# Patient Record
Sex: MALE | Race: WHITE | NOT HISPANIC OR LATINO | Employment: OTHER | RURAL
[De-identification: names, ages, dates, MRNs, and addresses within clinical notes are randomized per-mention and may not be internally consistent; named-entity substitution may affect disease eponyms.]

---

## 2017-02-07 ENCOUNTER — OFFICE VISIT (OUTPATIENT)
Dept: FAMILY MEDICINE CLINIC | Facility: CLINIC | Age: 67
End: 2017-02-07

## 2017-02-07 VITALS
TEMPERATURE: 98.7 F | BODY MASS INDEX: 27.03 KG/M2 | DIASTOLIC BLOOD PRESSURE: 70 MMHG | WEIGHT: 168.2 LBS | OXYGEN SATURATION: 99 % | HEIGHT: 66 IN | SYSTOLIC BLOOD PRESSURE: 130 MMHG | HEART RATE: 91 BPM

## 2017-02-07 DIAGNOSIS — J44.9 CHRONIC OBSTRUCTIVE PULMONARY DISEASE, UNSPECIFIED COPD TYPE (HCC): ICD-10-CM

## 2017-02-07 DIAGNOSIS — E66.3 OVERWEIGHT (BMI 25.0-29.9): ICD-10-CM

## 2017-02-07 DIAGNOSIS — I10 ESSENTIAL HYPERTENSION: Primary | ICD-10-CM

## 2017-02-07 DIAGNOSIS — G89.29 CHRONIC RIGHT-SIDED LOW BACK PAIN WITH RIGHT-SIDED SCIATICA: ICD-10-CM

## 2017-02-07 DIAGNOSIS — M54.41 CHRONIC RIGHT-SIDED LOW BACK PAIN WITH RIGHT-SIDED SCIATICA: ICD-10-CM

## 2017-02-07 DIAGNOSIS — J41.0 SIMPLE CHRONIC BRONCHITIS (HCC): ICD-10-CM

## 2017-02-07 DIAGNOSIS — F17.210 CIGARETTE SMOKER: ICD-10-CM

## 2017-02-07 PROCEDURE — 99214 OFFICE O/P EST MOD 30 MIN: CPT | Performed by: FAMILY MEDICINE

## 2017-02-07 NOTE — PROGRESS NOTES
Subjective   Yehuda Rutledge is a 66 y.o. overweight white male smoker with COPD, Allergies, H/O Lumbar back surgery, Lumbago/Sciatica, H/O ETOH abuse in remission, and other health problems see PMH/PSH. Pt presents for exam, to discuss current health problem list, tx plan and F/U plan.    ' Breathing has been OK. Allergies and sinuses doing all right. Back pain ,and sciatica has not flared lately'.    Back Pain   This is a chronic problem. The current episode started more than 1 month ago. The problem occurs daily. The problem has been gradually improving since onset. The pain is present in the lumbar spine (Right Lumbar pain radiating down R lat leg. ). The quality of the pain is described as aching, shooting and stabbing. The pain radiates to the right thigh, right knee and right foot. The pain is at a severity of 1/10. The pain is mild. The symptoms are aggravated by standing, twisting and position. Stiffness is present all day. Pertinent negatives include no abdominal pain or fever. (Pain and numbness radiating down R outer leg) Risk factors include poor posture. He has tried analgesics, bed rest, heat, chiropractic manipulation, home exercises, muscle relaxant, NSAIDs, walking and ice for the symptoms. The treatment provided significant relief.   COPD   This is a chronic problem. The current episode started more than 1 year ago. The problem occurs daily. The problem has been unchanged. Associated symptoms include congestion and coughing. Pertinent negatives include no abdominal pain, chills, fatigue, fever, myalgias or sore throat. The symptoms are aggravated by coughing, exertion and smoking. The treatment provided mild relief.   Allergic Reaction   This is a chronic problem. The current episode started more than 1 week ago. The problem occurs daily. The problem has been gradually worsening since onset. Associated with: Chronic allergies, recently exposed to mold with worsening upper resp symptoms. The time of  exposure was just prior to onset. Incident location: [place], exposed to mold ,has allergy. Associated symptoms include coughing and wheezing. Pertinent negatives include no abdominal pain. Past treatments include one or more OTC medications. The treatment provided significant relief. His past medical history is significant for medication allergies and seasonal allergies. (PCN, Mold)        The following portions of the patient's history were reviewed and updated as appropriate: allergies, current medications, past family history, past medical history, past social history, past surgical history and problem list.    Review of Systems   Constitutional: Negative for chills, fatigue and fever.   HENT: Positive for congestion. Negative for mouth sores, postnasal drip and sore throat.    Eyes: Negative for discharge and visual disturbance.   Respiratory: Positive for cough and wheezing. Negative for shortness of breath.    Cardiovascular: Negative for palpitations and leg swelling.   Gastrointestinal: Negative for abdominal pain.   Endocrine: Negative.    Musculoskeletal: Positive for back pain. Negative for myalgias.   Allergic/Immunologic: Negative.    Neurological: Negative.    Hematological: Negative for adenopathy. Does not bruise/bleed easily.   Psychiatric/Behavioral: Negative for dysphoric mood. The patient is nervous/anxious. The patient is not hyperactive.        Objective   Physical Exam   Constitutional: He is oriented to person, place, and time. He appears well-developed and well-nourished. No distress.   HENT:   Head: Normocephalic and atraumatic.   Right Ear: External ear normal.   Left Ear: External ear normal.   Nose: Nose normal.   Mouth/Throat: Oropharynx is clear and moist. No oropharyngeal exudate.   Eyes: EOM are normal. Pupils are equal, round, and reactive to light. Right eye exhibits no discharge. Left eye exhibits no discharge. No scleral icterus.   Neck: Normal  range of motion. Neck supple. No JVD present. No tracheal deviation present. No thyromegaly present.   Cardiovascular: Normal rate, regular rhythm, normal heart sounds and intact distal pulses.  Exam reveals no gallop and no friction rub.    No murmur heard.  Pulmonary/Chest: Effort normal. No stridor. No respiratory distress. He has wheezes. He has no rales. He exhibits no tenderness.   Abdominal: Soft. Bowel sounds are normal. He exhibits no distension and no mass. There is no tenderness. There is no rebound and no guarding. No hernia.   Musculoskeletal:   Low back pain WB2-4 with SL, No worse with  L leg crossover. Pain radiating down R leg has improved,    Lymphadenopathy:     He has no cervical adenopathy.   Neurological: He is alert and oriented to person, place, and time. He has normal reflexes. He displays normal reflexes. No cranial nerve deficit. He exhibits normal muscle tone. Coordination normal.   Skin: Skin is warm and dry. No rash noted. He is not diaphoretic. No erythema. No pallor.   Psychiatric: He has a normal mood and affect. His behavior is normal. Judgment and thought content normal.   Nursing note and vitals reviewed.      Assessment/Plan      Yehuda was seen today for copd, bronchitis, hyperlipidemia, cough and toe pain.    Diagnoses and all orders for this visit:    Essential hypertension  -     Comprehensive Metabolic Panel; Future  -     TSH; Future  -     CBC & AUTO Differential; Future  -     Lipid Panel; Future  -     Urinalysis (clean catch); Future    Simple chronic bronchitis    Overweight (BMI 25.0-29.9)    Cigarette smoker    Chronic right-sided low back pain with right-sided sciatica    Chronic obstructive pulmonary disease, unspecified COPD type      Discussed current health problem list, meds, indications, tx plan, rationale, F/U plan. Discussed BMI, BEE 3-4 small meals 1800 ginny/day. Benefits of exercise. Smoking cessation x 3mins. Discussed USPSTF recommendations.

## 2017-02-08 NOTE — PATIENT INSTRUCTIONS
"Smoking Cessation, Tips for Success  If you are ready to quit smoking, congratulations! You have chosen to help yourself be healthier. Cigarettes bring nicotine, tar, carbon monoxide, and other irritants into your body. Your lungs, heart, and blood vessels will be able to work better without these poisons. There are many different ways to quit smoking. Nicotine gum, nicotine patches, a nicotine inhaler, or nicotine nasal spray can help with physical craving. Hypnosis, support groups, and medicines help break the habit of smoking.  WHAT THINGS CAN I DO TO MAKE QUITTING EASIER?   Here are some tips to help you quit for good:  · Pick a date when you will quit smoking completely. Tell all of your friends and family about your plan to quit on that date.  · Do not try to slowly cut down on the number of cigarettes you are smoking. Pick a quit date and quit smoking completely starting on that day.  · Throw away all cigarettes.    · Clean and remove all ashtrays from your home, work, and car.  · On a card, write down your reasons for quitting. Carry the card with you and read it when you get the urge to smoke.  · Cleanse your body of nicotine. Drink enough water and fluids to keep your urine clear or pale yellow. Do this after quitting to flush the nicotine from your body.  · Learn to predict your moods. Do not let a bad situation be your excuse to have a cigarette. Some situations in your life might tempt you into wanting a cigarette.  · Never have \"just one\" cigarette. It leads to wanting another and another. Remind yourself of your decision to quit.  · Change habits associated with smoking. If you smoked while driving or when feeling stressed, try other activities to replace smoking. Stand up when drinking your coffee. Brush your teeth after eating. Sit in a different chair when you read the paper. Avoid alcohol while trying to quit, and try to drink fewer caffeinated beverages. Alcohol and caffeine may urge you to " "smoke.  · Avoid foods and drinks that can trigger a desire to smoke, such as sugary or spicy foods and alcohol.  · Ask people who smoke not to smoke around you.  · Have something planned to do right after eating or having a cup of coffee. For example, plan to take a walk or exercise.  · Try a relaxation exercise to calm you down and decrease your stress. Remember, you may be tense and nervous for the first 2 weeks after you quit, but this will pass.  · Find new activities to keep your hands busy. Play with a pen, coin, or rubber band. Doodle or draw things on paper.  · Brush your teeth right after eating. This will help cut down on the craving for the taste of tobacco after meals. You can also try mouthwash.    · Use oral substitutes in place of cigarettes. Try using lemon drops, carrots, cinnamon sticks, or chewing gum. Keep them handy so they are available when you have the urge to smoke.  · When you have the urge to smoke, try deep breathing.  · Designate your home as a nonsmoking area.  · If you are a heavy smoker, ask your health care provider about a prescription for nicotine chewing gum. It can ease your withdrawal from nicotine.  · Reward yourself. Set aside the cigarette money you save and buy yourself something nice.  · Look for support from others. Join a support group or smoking cessation program. Ask someone at home or at work to help you with your plan to quit smoking.  · Always ask yourself, \"Do I need this cigarette or is this just a reflex?\" Tell yourself, \"Today, I choose not to smoke,\" or \"I do not want to smoke.\" You are reminding yourself of your decision to quit.  · Do not replace cigarette smoking with electronic cigarettes (commonly called e-cigarettes). The safety of e-cigarettes is unknown, and some may contain harmful chemicals.  · If you relapse, do not give up! Plan ahead and think about what you will do the next time you get the urge to smoke.  HOW WILL I FEEL WHEN I QUIT SMOKING?  You " may have symptoms of withdrawal because your body is used to nicotine (the addictive substance in cigarettes). You may crave cigarettes, be irritable, feel very hungry, cough often, get headaches, or have difficulty concentrating. The withdrawal symptoms are only temporary. They are strongest when you first quit but will go away within 10-14 days. When withdrawal symptoms occur, stay in control. Think about your reasons for quitting. Remind yourself that these are signs that your body is healing and getting used to being without cigarettes. Remember that withdrawal symptoms are easier to treat than the major diseases that smoking can cause.   Even after the withdrawal is over, expect periodic urges to smoke. However, these cravings are generally short lived and will go away whether you smoke or not. Do not smoke!  WHAT RESOURCES ARE AVAILABLE TO HELP ME QUIT SMOKING?  Your health care provider can direct you to community resources or hospitals for support, which may include:  · Group support.  · Education.  · Hypnosis.  · Therapy.     This information is not intended to replace advice given to you by your health care provider. Make sure you discuss any questions you have with your health care provider.     Document Released: 09/15/2005 Document Revised: 01/08/2016 Document Reviewed: 06/05/2014  [employer] Interactive Patient Education ©2016 [employer]  Calorie Counting for Weight Loss  Calories are energy you get from the things you eat and drink. Your body uses this energy to keep you going throughout the day. The number of calories you eat affects your weight. When you eat more calories than your body needs, your body stores the extra calories as fat. When you eat fewer calories than your body needs, your body burns fat to get the energy it needs.  Calorie counting means keeping track of how many calories you eat and drink each day. If you make sure to eat fewer calories than your body needs, you should lose  weight. In order for calorie counting to work, you will need to eat the number of calories that are right for you in a day to lose a healthy amount of weight per week. A healthy amount of weight to lose per week is usually 1-2 lb (0.5-0.9 kg). A dietitian can determine how many calories you need in a day and give you suggestions on how to reach your calorie goal.   WHAT IS MY MY PLAN?  My goal is to have __________ calories per day.   If I have this many calories per day, I should lose around __________ pounds per week.  WHAT DO I NEED TO KNOW ABOUT CALORIE COUNTING?  In order to meet your daily calorie goal, you will need to:  · Find out how many calories are in each food you would like to eat. Try to do this before you eat.  · Decide how much of the food you can eat.  · Write down what you ate and how many calories it had. Doing this is called keeping a food log.  WHERE DO I FIND CALORIE INFORMATION?  The number of calories in a food can be found on a Nutrition Facts label. Note that all the information on a label is based on a specific serving of the food. If a food does not have a Nutrition Facts label, try to look up the calories online or ask your dietitian for help.  HOW DO I DECIDE HOW MUCH TO EAT?  To decide how much of the food you can eat, you will need to consider both the number of calories in one serving and the size of one serving. This information can be found on the Nutrition Facts label. If a food does not have a Nutrition Facts label, look up the information online or ask your dietitian for help.  Remember that calories are listed per serving. If you choose to have more than one serving of a food, you will have to multiply the calories per serving by the amount of servings you plan to eat. For example, the label on a package of bread might say that a serving size is 1 slice and that there are 90 calories in a serving. If you eat 1 slice, you will have eaten 90 calories. If you eat 2 slices, you  will have eaten 180 calories.  HOW DO I KEEP A FOOD LOG?  After each meal, record the following information in your food log:  · What you ate.  · How much of it you ate.  · How many calories it had.  · Then, add up your calories.  Keep your food log near you, such as in a small notebook in your pocket. Another option is to use a mobile kateryna or website. Some programs will calculate calories for you and show you how many calories you have left each time you add an item to the log.  WHAT ARE SOME CALORIE COUNTING TIPS?  · Use your calories on foods and drinks that will fill you up and not leave you hungry. Some examples of this include foods like nuts and nut butters, vegetables, lean proteins, and high-fiber foods (more than 5 g fiber per serving).  · Eat nutritious foods and avoid empty calories. Empty calories are calories you get from foods or beverages that do not have many nutrients, such as candy and soda. It is better to have a nutritious high-calorie food (such as an avocado) than a food with few nutrients (such as a bag of chips).  · Know how many calories are in the foods you eat most often. This way, you do not have to look up how many calories they have each time you eat them.  · Look out for foods that may seem like low-calorie foods but are really high-calorie foods, such as baked goods, soda, and fat-free candy.  · Pay attention to calories in drinks. Drinks such as sodas, specialty coffee drinks, alcohol, and juices have a lot of calories yet do not fill you up. Choose low-calorie drinks like water and diet drinks.  · Focus your calorie counting efforts on higher calorie items. Logging the calories in a garden salad that contains only vegetables is less important than calculating the calories in a milk shake.  · Find a way of tracking calories that works for you. Get creative. Most people who are successful find ways to keep track of how much they eat in a day, even if they do not count every  calorie.  WHAT ARE SOME PORTION CONTROL TIPS?  · Know how many calories are in a serving. This will help you know how many servings of a certain food you can have.  · Use a measuring cup to measure serving sizes. This is helpful when you start out. With time, you will be able to estimate serving sizes for some foods.  · Take some time to put servings of different foods on your favorite plates, bowls, and cups so you know what a serving looks like.  · Try not to eat straight from a bag or box. Doing this can lead to overeating. Put the amount you would like to eat in a cup or on a plate to make sure you are eating the right portion.  · Use smaller plates, glasses, and bowls to prevent overeating. This is a quick and easy way to practice portion control. If your plate is smaller, less food can fit on it.  · Try not to multitask while eating, such as watching TV or using your computer. If it is time to eat, sit down at a table and enjoy your food. Doing this will help you to start recognizing when you are full. It will also make you more aware of what and how much you are eating.  HOW CAN I CALORIE COUNT WHEN EATING OUT?  · Ask for smaller portion sizes or child-sized portions.  · Consider sharing an entree and sides instead of getting your own entree.  · If you get your own entree, eat only half. Ask for a box at the beginning of your meal and put the rest of your entree in it so you are not tempted to eat it.  · Look for the calories on the menu. If calories are listed, choose the lower calorie options.  · Choose dishes that include vegetables, fruits, whole grains, low-fat dairy products, and lean protein. Focusing on smart food choices from each of the 5 food groups can help you stay on track at restaurants.  · Choose items that are boiled, broiled, grilled, or steamed.  · Choose water, milk, unsweetened iced tea, or other drinks without added sugars. If you want an alcoholic beverage, choose a lower calorie  "option. For example, a regular angela can have up to 700 calories and a glass of wine has around 150.  · Stay away from items that are buttered, battered, fried, or served with cream sauce. Items labeled \"crispy\" are usually fried, unless stated otherwise.  · Ask for dressings, sauces, and syrups on the side. These are usually very high in calories, so do not eat much of them.  · Watch out for salads. Many people think salads are a healthy option, but this is often not the case. Many salads come with barrett, fried chicken, lots of cheese, fried chips, and dressing. All of these items have a lot of calories. If you want a salad, choose a garden salad and ask for grilled meats or steak. Ask for the dressing on the side, or ask for olive oil and vinegar or lemon to use as dressing.  · Estimate how many servings of a food you are given. For example, a serving of cooked rice is ½ cup or about the size of half a tennis ball or one cupcake wrapper. Knowing serving sizes will help you be aware of how much food you are eating at restaurants. The list below tells you how big or small some common portion sizes are based on everyday objects.    1 oz--4 stacked dice.    3 oz--1 deck of cards.    1 tsp--1 dice.    1 Tbsp--½ a Ping-Pong ball.    2 Tbsp--1 Ping-Pong ball.    ½ cup--1 tennis ball or 1 cupcake wrapper.    1 cup--1 baseball.     This information is not intended to replace advice given to you by your health care provider. Make sure you discuss any questions you have with your health care provider.     Document Released: 12/18/2006 Document Revised: 01/08/2016 Document Reviewed: 10/23/2014  [employer] Interactive Patient Education ©2016 [employer]  Exercising to Lose Weight  Exercising can help you to lose weight. In order to lose weight through exercise, you need to do vigorous-intensity exercise. You can tell that you are exercising with vigorous intensity if you are breathing very hard and fast and cannot hold a " conversation while exercising.  Moderate-intensity exercise helps to maintain your current weight. You can tell that you are exercising at a moderate level if you have a higher heart rate and faster breathing, but you are still able to hold a conversation.  HOW OFTEN SHOULD I EXERCISE?  Choose an activity that you enjoy and set realistic goals. Your health care provider can help you to make an activity plan that works for you. Exercise regularly as directed by your health care provider. This may include:  · Doing resistance training twice each week, such as:    Push-ups.    Sit-ups.    Lifting weights.    Using resistance bands.  · Doing a given intensity of exercise for a given amount of time. Choose from these options:    150 minutes of moderate-intensity exercise every week.    75 minutes of vigorous-intensity exercise every week.    A mix of moderate-intensity and vigorous-intensity exercise every week.  Children, pregnant women, people who are out of shape, people who are overweight, and older adults may need to consult a health care provider for individual recommendations. If you have any sort of medical condition, be sure to consult your health care provider before starting a new exercise program.  WHAT ARE SOME ACTIVITIES THAT CAN HELP ME TO LOSE WEIGHT?   · Walking at a rate of at least 4.5 miles an hour.  · Jogging or running at a rate of 5 miles per hour.  · Biking at a rate of at least 10 miles per hour.  · Lap swimming.  · Roller-skating or in-line skating.  · Cross-country skiing.  · Vigorous competitive sports, such as football, basketball, and soccer.  · Jumping rope.  · Aerobic dancing.  HOW CAN I BE MORE ACTIVE IN MY DAY-TO-DAY ACTIVITIES?  · Use the stairs instead of the elevator.  · Take a walk during your lunch break.  · If you drive, park your car farther away from work or school.  · If you take public transportation, get off one stop early and walk the rest of the way.  · Make all of your  phone calls while standing up and walking around.  · Get up, stretch, and walk around every 30 minutes throughout the day.  WHAT GUIDELINES SHOULD I FOLLOW WHILE EXERCISING?  · Do not exercise so much that you hurt yourself, feel dizzy, or get very short of breath.  · Consult your health care provider prior to starting a new exercise program.  · Wear comfortable clothes and shoes with good support.  · Drink plenty of water while you exercise to prevent dehydration or heat stroke. Body water is lost during exercise and must be replaced.  · Work out until you breathe faster and your heart beats faster.     This information is not intended to replace advice given to you by your health care provider. Make sure you discuss any questions you have with your health care provider.     Document Released: 01/20/2012 Document Revised: 01/08/2016 Document Reviewed: 05/21/2015  [employer] Interactive Patient Education ©2016 [employer]  Preventive Care for Adults, Male  A healthy lifestyle and preventive care can promote health and wellness. Preventive health guidelines for men include the following key practices:  · A routine yearly physical is a good way to check with your health care provider about your health and preventative screening. It is a chance to share any concerns and updates on your health and to receive a thorough exam.  · Visit your dentist for a routine exam and preventative care every 6 months. Brush your teeth twice a day and floss once a day. Good oral hygiene prevents tooth decay and gum disease.  · The frequency of eye exams is based on your age, health, family medical history, use of contact lenses, and other factors. Follow your health care provider's recommendations for frequency of eye exams.  · Eat a healthy diet. Foods such as vegetables, fruits, whole grains, low-fat dairy products, and lean protein foods contain the nutrients you need without too many calories. Decrease your intake of foods high in  solid fats, added sugars, and salt. Eat the right amount of calories for you. Get information about a proper diet from your health care provider, if necessary.  · Regular physical exercise is one of the most important things you can do for your health. Most adults should get at least 150 minutes of moderate-intensity exercise (any activity that increases your heart rate and causes you to sweat) each week. In addition, most adults need muscle-strengthening exercises on 2 or more days a week.  · Maintain a healthy weight. The body mass index (BMI) is a screening tool to identify possible weight problems. It provides an estimate of body fat based on height and weight. Your health care provider can find your BMI and can help you achieve or maintain a healthy weight. For adults 20 years and older:    A BMI below 18.5 is considered underweight.    A BMI of 18.5 to 24.9 is normal.    A BMI of 25 to 29.9 is considered overweight.    A BMI of 30 and above is considered obese.  · Maintain normal blood lipids and cholesterol levels by exercising and minimizing your intake of saturated fat. Eat a balanced diet with plenty of fruit and vegetables. Blood tests for lipids and cholesterol should begin at age 20 and be repeated every 5 years. If your lipid or cholesterol levels are high, you are over 50, or you are at high risk for heart disease, you may need your cholesterol levels checked more frequently. Ongoing high lipid and cholesterol levels should be treated with medicines if diet and exercise are not working.  · If you smoke, find out from your health care provider how to quit. If you do not use tobacco, do not start.  · Lung cancer screening is recommended for adults aged 55-80 years who are at high risk for developing lung cancer because of a history of smoking. A yearly low-dose CT scan of the lungs is recommended for people who have at least a 30-pack-year history of smoking and are a current smoker or have quit within  the past 15 years. A pack year of smoking is smoking an average of 1 pack of cigarettes a day for 1 year (for example: 1 pack a day for 30 years or 2 packs a day for 15 years). Yearly screening should continue until the smoker has stopped smoking for at least 15 years. Yearly screening should be stopped for people who develop a health problem that would prevent them from having lung cancer treatment.  · If you choose to drink alcohol, do not have more than 2 drinks per day. One drink is considered to be 12 ounces (355 mL) of beer, 5 ounces (148 mL) of wine, or 1.5 ounces (44 mL) of liquor.  · Avoid use of street drugs. Do not share needles with anyone. Ask for help if you need support or instructions about stopping the use of drugs.  · High blood pressure causes heart disease and increases the risk of stroke. Your blood pressure should be checked at least every 1-2 years. Ongoing high blood pressure should be treated with medicines, if weight loss and exercise are not effective.  · If you are 45-79 years old, ask your health care provider if you should take aspirin to prevent heart disease.  · Diabetes screening is done by taking a blood sample to check your blood glucose level after you have not eaten for a certain period of time (fasting). If you are not overweight and you do not have risk factors for diabetes, you should be screened once every 3 years starting at age 45. If you are overweight or obese and you are 40-70 years of age, you should be screened for diabetes every year as part of your cardiovascular risk assessment.  · Colorectal cancer can be detected and often prevented. Most routine colorectal cancer screening begins at the age of 50 and continues through age 75. However, your health care provider may recommend screening at an earlier age if you have risk factors for colon cancer. On a yearly basis, your health care provider may provide home test kits to check for hidden blood in the stool. Use of a  small camera at the end of a tube to directly examine the colon (sigmoidoscopy or colonoscopy) can detect the earliest forms of colorectal cancer. Talk to your health care provider about this at age 50, when routine screening begins. Direct exam of the colon should be repeated every 5-10 years through age 75, unless early forms of precancerous polyps or small growths are found.  · People who are at an increased risk for hepatitis B should be screened for this virus. You are considered at high risk for hepatitis B if:    You were born in a country where hepatitis B occurs often. Talk with your health care provider about which countries are considered high risk.    Your parents were born in a high-risk country and you have not received a shot to protect against hepatitis B (hepatitis B vaccine).    You have HIV or AIDS.    You use needles to inject street drugs.    You live with, or have sex with, someone who has hepatitis B.    You are a man who has sex with other men (MSM).    You get hemodialysis treatment.    You take certain medicines for conditions such as cancer, organ transplantation, and autoimmune conditions.  · Hepatitis C blood testing is recommended for all people born from 1945 through 1965 and any individual with known risks for hepatitis C.  · Practice safe sex. Use condoms and avoid high-risk sexual practices to reduce the spread of sexually transmitted infections (STIs). STIs include gonorrhea, chlamydia, syphilis, trichomonas, herpes, HPV, and human immunodeficiency virus (HIV). Herpes, HIV, and HPV are viral illnesses that have no cure. They can result in disability, cancer, and death.  · If you are a man who has sex with other men, you should be screened at least once per year for:    HIV.    Urethral, rectal, and pharyngeal infection of gonorrhea, chlamydia, or both.  · If you are at risk of being infected with HIV, it is recommended that you take a prescription medicine daily to prevent HIV  infection. This is called preexposure prophylaxis (PrEP). You are considered at risk if:    You are a man who has sex with other men (MSM) and have other risk factors.    You are a heterosexual man, are sexually active, and are at increased risk for HIV infection.    You take drugs by injection.    You are sexually active with a partner who has HIV.    Talk with your health care provider about whether you are at high risk of being infected with HIV. If you choose to begin PrEP, you should first be tested for HIV. You should then be tested every 3 months for as long as you are taking PrEP.  · A one-time screening for abdominal aortic aneurysm (AAA) and surgical repair of large AAAs by ultrasound are recommended for men ages 65 to 75 years who are current or former smokers.  · Healthy men should no longer receive prostate-specific antigen (PSA) blood tests as part of routine cancer screening. Talk with your health care provider about prostate cancer screening.  · Testicular cancer screening is not recommended for adult males who have no symptoms. Screening includes self-exam, a health care provider exam, and other screening tests. Consult with your health care provider about any symptoms you have or any concerns you have about testicular cancer.  · Use sunscreen. Apply sunscreen liberally and repeatedly throughout the day. You should seek shade when your shadow is shorter than you. Protect yourself by wearing long sleeves, pants, a wide-brimmed hat, and sunglasses year round, whenever you are outdoors.  · Once a month, do a whole-body skin exam, using a mirror to look at the skin on your back. Tell your health care provider about new moles, moles that have irregular borders, moles that are larger than a pencil eraser, or moles that have changed in shape or color.  · Stay current with required vaccines (immunizations).    Influenza vaccine. All adults should be immunized every year.    Tetanus, diphtheria, and  acellular pertussis (Td, Tdap) vaccine. An adult who has not previously received Tdap or who does not know his vaccine status should receive 1 dose of Tdap. This initial dose should be followed by tetanus and diphtheria toxoids (Td) booster doses every 10 years. Adults with an unknown or incomplete history of completing a 3-dose immunization series with Td-containing vaccines should begin or complete a primary immunization series including a Tdap dose. Adults should receive a Td booster every 10 years.    Varicella vaccine. An adult without evidence of immunity to varicella should receive 2 doses or a second dose if he has previously received 1 dose.    Human papillomavirus (HPV) vaccine. Males aged 11-21 years who have not received the vaccine previously should receive the 3-dose series. Males aged 22-26 years may be immunized. Immunization is recommended through the age of 26 years for any male who has sex with males and did not get any or all doses earlier. Immunization is recommended for any person with an immunocompromised condition through the age of 26 years if he did not get any or all doses earlier. During the 3-dose series, the second dose should be obtained 4-8 weeks after the first dose. The third dose should be obtained 24 weeks after the first dose and 16 weeks after the second dose.    Zoster vaccine. One dose is recommended for adults aged 60 years or older unless certain conditions are present.    Measles, mumps, and rubella (MMR) vaccine. Adults born before 1957 generally are considered immune to measles and mumps. Adults born in 1957 or later should have 1 or more doses of MMR vaccine unless there is a contraindication to the vaccine or there is laboratory evidence of immunity to each of the three diseases. A routine second dose of MMR vaccine should be obtained at least 28 days after the first dose for students attending postsecondary schools, health care workers, or international travelers.  People who received inactivated measles vaccine or an unknown type of measles vaccine during [id number] should receive 2 doses of MMR vaccine. People who received inactivated mumps vaccine or an unknown type of mumps vaccine before 1979 and are at high risk for mumps infection should consider immunization with 2 doses of MMR vaccine. Unvaccinated health care workers born before 1957 who lack laboratory evidence of measles, mumps, or rubella immunity or laboratory confirmation of disease should consider measles and mumps immunization with 2 doses of MMR vaccine or rubella immunization with 1 dose of MMR vaccine.    Pneumococcal 13-valent conjugate (PCV13) vaccine. When indicated, a person who is uncertain of his immunization history and has no record of immunization should receive the PCV13 vaccine. All adults 65 years of age and older should receive this vaccine. An adult aged 19 years or older who has certain medical conditions and has not been previously immunized should receive 1 dose of PCV13 vaccine. This PCV13 should be followed with a dose of pneumococcal polysaccharide (PPSV23) vaccine. Adults who are at high risk for pneumococcal disease should obtain the PPSV23 vaccine at least 8 weeks after the dose of PCV13 vaccine. Adults older than 65 years of age who have normal immune system function should obtain the PPSV23 vaccine dose at least 1 year after the dose of PCV13 vaccine.    Pneumococcal polysaccharide (PPSV23) vaccine. When PCV13 is also indicated, PCV13 should be obtained first. All adults aged 65 years and older should be immunized. An adult younger than age 65 years who has certain medical conditions should be immunized. Any person who resides in a nursing home or long-term care facility should be immunized. An adult smoker should be immunized. People with an immunocompromised condition and certain other conditions should receive both PCV13 and PPSV23 vaccines. People with human immunodeficiency  virus (HIV) infection should be immunized as soon as possible after diagnosis. Immunization during chemotherapy or radiation therapy should be avoided. Routine use of PPSV23 vaccine is not recommended for American Indians, [place] Natives, or people younger than 65 years unless there are medical conditions that require PPSV23 vaccine. When indicated, people who have unknown immunization and have no record of immunization should receive PPSV23 vaccine. One-time revaccination 5 years after the first dose of PPSV23 is recommended for people aged 19-64 years who have chronic kidney failure, nephrotic syndrome, asplenia, or immunocompromised conditions. People who received 1-2 doses of PPSV23 before age 65 years should receive another dose of PPSV23 vaccine at age 65 years or later if at least 5 years have passed since the previous dose. Doses of PPSV23 are not needed for people immunized with PPSV23 at or after age 65 years.    Meningococcal vaccine. Adults with asplenia or persistent complement component deficiencies should receive 2 doses of quadrivalent meningococcal conjugate (MenACWY-D) vaccine. The doses should be obtained at least 2 months apart. Microbiologists working with certain meningococcal bacteria,  recruits, people at risk during an outbreak, and people who travel to or live in countries with a high rate of meningitis should be immunized. A first-year college student up through age 21 years who is living in a residence jimenes should receive a dose if he did not receive a dose on or after his 16th birthday. Adults who have certain high-risk conditions should receive one or more doses of vaccine.    Hepatitis A vaccine. Adults who wish to be protected from this disease, have chronic liver disease, work with hepatitis A-infected animals, work in hepatitis A research labs, or travel to or work in countries with a high rate of hepatitis A should be immunized. Adults who were previously unvaccinated and  who anticipate close contact with an international adoptee during the first 60 days after arrival in the [place] from a country with a high rate of hepatitis A should be immunized.    Hepatitis B vaccine. Adults should be immunized if they wish to be protected from this disease, are under age 59 years and have diabetes, have chronic liver disease, have had more than one sex partner in the past 6 months, may be exposed to blood or other infectious body fluids, are household contacts or sex partners of hepatitis B positive people, are clients or workers in certain care facilities, or travel to or work in countries with a high rate of hepatitis B.    Haemophilus influenzae type b (Hib) vaccine. A previously unvaccinated person with asplenia or sickle cell disease or having a scheduled splenectomy should receive 1 dose of Hib vaccine. Regardless of previous immunization, a recipient of a hematopoietic stem cell transplant should receive a 3-dose series 6-12 months after his successful transplant. Hib vaccine is not recommended for adults with HIV infection.  Preventive Service / Frequency  Ages 19 to 39  · Blood pressure check.** / Every 3-5 years.  · Lipid and cholesterol check.** / Every 5 years beginning at age 20.  · Hepatitis C blood test.** / For any individual with known risks for hepatitis C.  · Skin self-exam. / Monthly.  · Influenza vaccine. / Every year.  · Tetanus, diphtheria, and acellular pertussis (Tdap, Td) vaccine.** / Consult your health care provider. 1 dose of Td every 10 years.  · Varicella vaccine.** / Consult your health care provider.  · HPV vaccine. / 3 doses over 6 months, if 26 or younger.  · Measles, mumps, rubella (MMR) vaccine.** / You need at least 1 dose of MMR if you were born in 1957 or later. You may also need a second dose.  · Pneumococcal 13-valent conjugate (PCV13) vaccine.** / Consult your health care provider.  · Pneumococcal polysaccharide (PPSV23) vaccine.** / 1 to 2 doses  if you smoke cigarettes or if you have certain conditions.  · Meningococcal vaccine.** / 1 dose if you are age 19 to 21 years and a first-year college student living in a residence jimenes, or have one of several medical conditions. You may also need additional booster doses.  · Hepatitis A vaccine.** / Consult your health care provider.  · Hepatitis B vaccine.** / Consult your health care provider.  · Haemophilus influenzae type b (Hib) vaccine.** / Consult your health care provider.  Ages 40 to 64  · Blood pressure check.** / Every year.  · Lipid and cholesterol check.** / Every 5 years beginning at age 20.  · Lung cancer screening. / Every year if you are aged 55-80 years and have a 30-pack-year history of smoking and currently smoke or have quit within the past 15 years. Yearly screening is stopped once you have quit smoking for at least 15 years or develop a health problem that would prevent you from having lung cancer treatment.  · Fecal occult blood test (FOBT) of stool. / Every year beginning at age 50 and continuing until age 75. You may not have to do this test if you get a colonoscopy every 10 years.  · Flexible sigmoidoscopy** or colonoscopy.** / Every 5 years for a flexible sigmoidoscopy or every 10 years for a colonoscopy beginning at age 50 and continuing until age 75.  · Hepatitis C blood test.** / For all people born from 1945 through 1965 and any individual with known risks for hepatitis C.  · Skin self-exam. / Monthly.  · Influenza vaccine. / Every year.  · Tetanus, diphtheria, and acellular pertussis (Tdap/Td) vaccine.** / Consult your health care provider. 1 dose of Td every 10 years.  · Varicella vaccine.** / Consult your health care provider.  · Zoster vaccine.** / 1 dose for adults aged 60 years or older.  · Measles, mumps, rubella (MMR) vaccine.** / You need at least 1 dose of MMR if you were born in 1957 or later. You may also need a second dose.  · Pneumococcal 13-valent conjugate (PCV13)  vaccine.** / Consult your health care provider.  · Pneumococcal polysaccharide (PPSV23) vaccine.** / 1 to 2 doses if you smoke cigarettes or if you have certain conditions.  · Meningococcal vaccine.** / Consult your health care provider.  · Hepatitis A vaccine.** / Consult your health care provider.  · Hepatitis B vaccine.** / Consult your health care provider.  · Haemophilus influenzae type b (Hib) vaccine.** / Consult your health care provider.  Ages 65 and over  · Blood pressure check.** / Every year.  · Lipid and cholesterol check.**/ Every 5 years beginning at age 20.  · Lung cancer screening. / Every year if you are aged 55-80 years and have a 30-pack-year history of smoking and currently smoke or have quit within the past 15 years. Yearly screening is stopped once you have quit smoking for at least 15 years or develop a health problem that would prevent you from having lung cancer treatment.  · Fecal occult blood test (FOBT) of stool. / Every year beginning at age 50 and continuing until age 75. You may not have to do this test if you get a colonoscopy every 10 years.  · Flexible sigmoidoscopy** or colonoscopy.** / Every 5 years for a flexible sigmoidoscopy or every 10 years for a colonoscopy beginning at age 50 and continuing until age 75.  · Hepatitis C blood test.** / For all people born from 1945 through 1965 and any individual with known risks for hepatitis C.  · Abdominal aortic aneurysm (AAA) screening.** / A one-time screening for ages 65 to 75 years who are current or former smokers.  · Skin self-exam. / Monthly.  · Influenza vaccine. / Every year.  · Tetanus, diphtheria, and acellular pertussis (Tdap/Td) vaccine.** / 1 dose of Td every 10 years.  · Varicella vaccine.** / Consult your health care provider.  · Zoster vaccine.** / 1 dose for adults aged 60 years or older.  · Pneumococcal 13-valent conjugate (PCV13) vaccine.** / 1 dose for all adults aged 65 years and older.  · Pneumococcal  polysaccharide (PPSV23) vaccine.** / 1 dose for all adults aged 65 years and older.  · Meningococcal vaccine.** / Consult your health care provider.  · Hepatitis A vaccine.** / Consult your health care provider.  · Hepatitis B vaccine.** / Consult your health care provider.  · Haemophilus influenzae type b (Hib) vaccine.** / Consult your health care provider.  **Family history and personal history of risk and conditions may change your health care provider's recommendations.     This information is not intended to replace advice given to you by your health care provider. Make sure you discuss any questions you have with your health care provider.     Document Released: 02/13/2003 Document Revised: 01/08/2016 Document Reviewed: 05/14/2012  [employer] Interactive Patient Education ©2016 [employer]

## 2017-02-10 ENCOUNTER — LAB (OUTPATIENT)
Dept: LAB | Facility: CLINIC | Age: 67
End: 2017-02-10

## 2017-02-10 DIAGNOSIS — I10 ESSENTIAL HYPERTENSION: ICD-10-CM

## 2017-02-10 LAB
ALBUMIN SERPL-MCNC: 4.5 G/DL (ref 3.4–4.8)
ALBUMIN/GLOB SERPL: 1.5 G/DL (ref 1.1–1.8)
ALP SERPL-CCNC: 81 U/L (ref 38–126)
ALT SERPL W P-5'-P-CCNC: 35 U/L (ref 21–72)
ANION GAP SERPL CALCULATED.3IONS-SCNC: 11 MMOL/L (ref 5–15)
ARTICHOKE IGE QN: 75 MG/DL (ref 1–129)
AST SERPL-CCNC: 29 U/L (ref 17–59)
BASOPHILS # BLD AUTO: 0.03 10*3/MM3 (ref 0–0.2)
BASOPHILS NFR BLD AUTO: 0.3 % (ref 0–2)
BILIRUB SERPL-MCNC: 0.5 MG/DL (ref 0.2–1.3)
BILIRUB UR QL STRIP: NEGATIVE
BUN BLD-MCNC: 10 MG/DL (ref 7–21)
BUN/CREAT SERPL: 12.2 (ref 7–25)
CALCIUM SPEC-SCNC: 10.3 MG/DL (ref 8.4–10.2)
CHLORIDE SERPL-SCNC: 96 MMOL/L (ref 95–110)
CHOLEST SERPL-MCNC: 194 MG/DL (ref 0–199)
CLARITY UR: CLEAR
CO2 SERPL-SCNC: 29 MMOL/L (ref 22–31)
COLOR UR: YELLOW
CREAT BLD-MCNC: 0.82 MG/DL (ref 0.7–1.3)
DEPRECATED RDW RBC AUTO: 45.3 FL (ref 35.1–43.9)
EOSINOPHIL # BLD AUTO: 0.11 10*3/MM3 (ref 0–0.7)
EOSINOPHIL NFR BLD AUTO: 1 % (ref 0–7)
ERYTHROCYTE [DISTWIDTH] IN BLOOD BY AUTOMATED COUNT: 14.2 % (ref 11.5–14.5)
GFR SERPL CREATININE-BSD FRML MDRD: 95 ML/MIN/1.73 (ref 49–113)
GLOBULIN UR ELPH-MCNC: 3.1 GM/DL (ref 2.3–3.5)
GLUCOSE BLD-MCNC: 95 MG/DL (ref 60–100)
GLUCOSE UR STRIP-MCNC: NEGATIVE MG/DL
HCT VFR BLD AUTO: 46.9 % (ref 39–49)
HDLC SERPL-MCNC: 34 MG/DL (ref 60–200)
HGB BLD-MCNC: 16 G/DL (ref 13.7–17.3)
HGB UR QL STRIP.AUTO: NEGATIVE
IMM GRANULOCYTES # BLD: 0.06 10*3/MM3 (ref 0–0.02)
IMM GRANULOCYTES NFR BLD: 0.5 % (ref 0–0.5)
KETONES UR QL STRIP: NEGATIVE
LDLC/HDLC SERPL: 2.61 {RATIO} (ref 0–3.55)
LEUKOCYTE ESTERASE UR QL STRIP.AUTO: NEGATIVE
LYMPHOCYTES # BLD AUTO: 3.89 10*3/MM3 (ref 0.6–4.2)
LYMPHOCYTES NFR BLD AUTO: 34.6 % (ref 10–50)
MCH RBC QN AUTO: 29.8 PG (ref 26.5–34)
MCHC RBC AUTO-ENTMCNC: 34.1 G/DL (ref 31.5–36.3)
MCV RBC AUTO: 87.3 FL (ref 80–98)
MONOCYTES # BLD AUTO: 0.94 10*3/MM3 (ref 0–0.9)
MONOCYTES NFR BLD AUTO: 8.4 % (ref 0–12)
NEUTROPHILS # BLD AUTO: 6.21 10*3/MM3 (ref 2–8.6)
NEUTROPHILS NFR BLD AUTO: 55.2 % (ref 37–80)
NITRITE UR QL STRIP: NEGATIVE
PH UR STRIP.AUTO: 5 [PH] (ref 5–8)
PLATELET # BLD AUTO: 319 10*3/MM3 (ref 150–450)
PMV BLD AUTO: 11.2 FL (ref 8–12)
POTASSIUM BLD-SCNC: 5 MMOL/L (ref 3.5–5.1)
PROT SERPL-MCNC: 7.6 G/DL (ref 6.3–8.6)
PROT UR QL STRIP: NEGATIVE
RBC # BLD AUTO: 5.37 10*6/MM3 (ref 4.37–5.74)
SODIUM BLD-SCNC: 136 MMOL/L (ref 137–145)
SP GR UR STRIP: <1.001 (ref 1–1.03)
TRIGL SERPL-MCNC: 356 MG/DL (ref 20–199)
TSH SERPL DL<=0.05 MIU/L-ACNC: 1.74 MIU/ML (ref 0.46–4.68)
UROBILINOGEN UR QL STRIP: ABNORMAL
WBC NRBC COR # BLD: 11.24 10*3/MM3 (ref 3.2–9.8)

## 2017-02-10 PROCEDURE — 84443 ASSAY THYROID STIM HORMONE: CPT | Performed by: FAMILY MEDICINE

## 2017-02-10 PROCEDURE — 85025 COMPLETE CBC W/AUTO DIFF WBC: CPT | Performed by: FAMILY MEDICINE

## 2017-02-10 PROCEDURE — 80061 LIPID PANEL: CPT | Performed by: FAMILY MEDICINE

## 2017-02-10 PROCEDURE — 81003 URINALYSIS AUTO W/O SCOPE: CPT | Performed by: FAMILY MEDICINE

## 2017-02-10 PROCEDURE — 80053 COMPREHEN METABOLIC PANEL: CPT | Performed by: FAMILY MEDICINE

## 2017-02-13 ENCOUNTER — TELEPHONE (OUTPATIENT)
Dept: FAMILY MEDICINE CLINIC | Facility: CLINIC | Age: 67
End: 2017-02-13

## 2017-04-12 ENCOUNTER — OFFICE VISIT (OUTPATIENT)
Dept: FAMILY MEDICINE CLINIC | Facility: CLINIC | Age: 67
End: 2017-04-12

## 2017-04-12 VITALS
TEMPERATURE: 97.8 F | WEIGHT: 165.7 LBS | BODY MASS INDEX: 26.63 KG/M2 | OXYGEN SATURATION: 97 % | SYSTOLIC BLOOD PRESSURE: 122 MMHG | HEIGHT: 66 IN | HEART RATE: 104 BPM | DIASTOLIC BLOOD PRESSURE: 64 MMHG

## 2017-04-12 DIAGNOSIS — F17.210 CIGARETTE SMOKER: ICD-10-CM

## 2017-04-12 DIAGNOSIS — R59.0 ADENOPATHY, CERVICAL: ICD-10-CM

## 2017-04-12 DIAGNOSIS — J40 BRONCHITIS: Primary | ICD-10-CM

## 2017-04-12 DIAGNOSIS — Z91.09 ENVIRONMENTAL ALLERGIES: ICD-10-CM

## 2017-04-12 DIAGNOSIS — J01.01 ACUTE RECURRENT MAXILLARY SINUSITIS: ICD-10-CM

## 2017-04-12 PROCEDURE — 96372 THER/PROPH/DIAG INJ SC/IM: CPT | Performed by: FAMILY MEDICINE

## 2017-04-12 PROCEDURE — 99214 OFFICE O/P EST MOD 30 MIN: CPT | Performed by: FAMILY MEDICINE

## 2017-04-12 RX ORDER — TRIAMCINOLONE ACETONIDE 40 MG/ML
40 INJECTION, SUSPENSION INTRA-ARTICULAR; INTRAMUSCULAR ONCE
Status: COMPLETED | OUTPATIENT
Start: 2017-04-12 | End: 2017-04-12

## 2017-04-12 RX ORDER — BROMPHENIRAMINE MALEATE, PSEUDOEPHEDRINE HYDROCHLORIDE, AND DEXTROMETHORPHAN HYDROBROMIDE 2; 30; 10 MG/5ML; MG/5ML; MG/5ML
5 SYRUP ORAL 4 TIMES DAILY PRN
Qty: 473 ML | Refills: 0 | Status: SHIPPED | OUTPATIENT
Start: 2017-04-12 | End: 2017-11-06 | Stop reason: SDUPTHER

## 2017-04-12 RX ORDER — LEVOFLOXACIN 750 MG/1
750 TABLET ORAL DAILY
Qty: 7 TABLET | Refills: 0 | Status: SHIPPED | OUTPATIENT
Start: 2017-04-12 | End: 2017-07-03

## 2017-04-12 RX ADMIN — TRIAMCINOLONE ACETONIDE 40 MG: 40 INJECTION, SUSPENSION INTRA-ARTICULAR; INTRAMUSCULAR at 10:35

## 2017-04-12 NOTE — PROGRESS NOTES
Subjective     Yehuda Rutledge is a 66 y.o. overweight white male smoker with COPD, Allergies, H/O Lumbar back surgery, Lumbago/Sciatica, H/O ETOH abuse in remission, and other health problems see PMH/PSH. Pt presents for exam, to discuss current acute health problem, tx plan and F/U plan.    ' Cough, congestion, L neck, and facial swelling, started 5 days ago, getting worse, chills , body aches over past 2 days. Had pressure L cheek and eye, broke loose, and drainage down throat, L throat sore swollen area in neck'.     Facial Swelling   This is a new problem. The current episode started in the past 7 days. The problem occurs daily. The problem has been gradually worsening. Associated symptoms include congestion, coughing, headaches, neck pain and a sore throat. Pertinent negatives include no abdominal pain, chills, fatigue, fever, myalgias, nausea, rash or vomiting. The symptoms are aggravated by bending and smoking. He has tried acetaminophen and NSAIDs (OTC Cold meds) for the symptoms. The treatment provided no relief.   Cough   This is a recurrent (Bronchitis) problem. The current episode started in the past 7 days. The problem has been gradually worsening. The problem occurs constantly. The cough is productive of sputum. Associated symptoms include headaches, a sore throat and wheezing. Pertinent negatives include no chills, fever, myalgias, postnasal drip, rash or shortness of breath. The symptoms are aggravated by other (Sinus drainage). Risk factors for lung disease include smoking/tobacco exposure and occupational exposure. He has tried a beta-agonist inhaler for the symptoms. The treatment provided no relief. His past medical history is significant for bronchitis, COPD, environmental allergies and pneumonia.   Wheezing    This is a recurrent problem. The current episode started in the past 7 days. Associated symptoms include coughing, headaches, neck pain and a sore throat. Pertinent negatives include no  abdominal pain, chills, diarrhea, fever, rash, shortness of breath or vomiting. He has tried beta agonist inhalers for the symptoms. The treatment provided significant relief. His past medical history is significant for COPD and pneumonia.   Sinusitis   This is a new problem. The current episode started in the past 7 days. The problem has been gradually worsening since onset. The maximum temperature recorded prior to his arrival was 100.4 - 100.9 F. The fever has been present for 1 to 2 days. His pain is at a severity of 6/10. The pain is moderate. Associated symptoms include congestion, coughing, headaches, neck pain, sinus pressure and a sore throat. Pertinent negatives include no chills or shortness of breath. (L maxillary) Past treatments include oral decongestants. The treatment provided no relief.        The following portions of the patient's history were reviewed and updated as appropriate: allergies, current medications, past family history, past medical history, past social history, past surgical history and problem list.    Review of Systems   Constitutional: Negative for chills, fatigue and fever.   HENT: Positive for congestion, facial swelling, sinus pressure and sore throat. Negative for mouth sores and postnasal drip.    Eyes: Negative for pain, discharge and visual disturbance.   Respiratory: Positive for cough, chest tightness and wheezing. Negative for shortness of breath.    Cardiovascular: Negative for palpitations and leg swelling.   Gastrointestinal: Negative for abdominal pain, constipation, diarrhea, nausea and vomiting.   Endocrine: Negative.  Negative for cold intolerance and heat intolerance.   Genitourinary: Negative.  Negative for difficulty urinating, hematuria and urgency.   Musculoskeletal: Positive for back pain and neck pain. Negative for myalgias.   Skin: Negative.  Negative for color change, pallor, rash and wound.   Allergic/Immunologic: Positive for environmental allergies.  Negative for food allergies and immunocompromised state.   Neurological: Positive for headaches. Negative for tremors and speech difficulty.   Hematological: Positive for adenopathy. Does not bruise/bleed easily.        L cervical   Psychiatric/Behavioral: Negative for dysphoric mood. The patient is nervous/anxious. The patient is not hyperactive.        Objective   Physical Exam   Constitutional: He is oriented to person, place, and time. He appears well-developed and well-nourished. No distress.   HENT:   Head: Normocephalic and atraumatic.   Right Ear: External ear normal.   Left Ear: External ear normal.   Nose: Nose normal.   Mouth/Throat: No oropharyngeal exudate.   Has tannish green PND  On L mod erythema.     Has puffiness, and C/O tenderness over L maxillary sinus.     Eyes: Conjunctivae and EOM are normal. Pupils are equal, round, and reactive to light. Right eye exhibits no discharge. Left eye exhibits no discharge. No scleral icterus.   Neck: Normal range of motion. Neck supple. No JVD present. No tracheal deviation present. No thyromegaly present.   L cervical adenopathy, tender to touch.   Cardiovascular: Normal rate, regular rhythm, normal heart sounds and intact distal pulses.  Exam reveals no gallop and no friction rub.    No murmur heard.  Pulmonary/Chest: Effort normal. No stridor. No respiratory distress. He has wheezes. He has no rales. He exhibits no tenderness.   Coarse rhonchi, productive cough tan secretions.    Abdominal: Soft. Bowel sounds are normal. He exhibits no distension and no mass. There is no tenderness. There is no rebound and no guarding. No hernia.   Lymphadenopathy:     He has cervical adenopathy.   Neurological: He is alert and oriented to person, place, and time. He has normal reflexes. He displays normal reflexes. No cranial nerve deficit. He exhibits normal muscle tone. Coordination normal.   Skin: Skin is warm and dry. No rash noted. He is not diaphoretic. No erythema. No  pallor.   Psychiatric: He has a normal mood and affect. His behavior is normal. Judgment and thought content normal.   Nursing note and vitals reviewed.      Assessment/Plan      Yehuda was seen today for facial swelling, generalized body aches, cough and wheezing.    Diagnoses and all orders for this visit:    Bronchitis  -     triamcinolone acetonide (KENALOG-40) injection 40 mg; Inject 1 mL into the shoulder, thigh, or buttocks 1 (One) Time.    Adenopathy, cervical    Cigarette smoker    Environmental allergies    Acute recurrent maxillary sinusitis  Comments:  Left    Other orders  -     levoFLOXacin (LEVAQUIN) 750 MG tablet; Take 1 tablet by mouth Daily.  -     brompheniramine-pseudoephedrine-DM 30-2-10 MG/5ML syrup; Take 5 mL by mouth 4 (Four) Times a Day As Needed for Allergies.      Discussed current health problem list, meds, indications, tx plan, rationale, F/U plan. Discussed smoking cessation x 3 mins.

## 2017-07-03 ENCOUNTER — TELEPHONE (OUTPATIENT)
Dept: FAMILY MEDICINE CLINIC | Facility: CLINIC | Age: 67
End: 2017-07-03

## 2017-07-03 DIAGNOSIS — J44.1 ACUTE EXACERBATION OF CHRONIC OBSTRUCTIVE PULMONARY DISEASE (COPD) (HCC): Primary | ICD-10-CM

## 2017-07-03 RX ORDER — LEVOFLOXACIN 750 MG/1
750 TABLET ORAL DAILY
Qty: 7 TABLET | Refills: 0 | Status: SHIPPED | OUTPATIENT
Start: 2017-07-03 | End: 2017-07-10

## 2017-07-03 RX ORDER — PREDNISONE 10 MG/1
10 TABLET ORAL SEE ADMIN INSTRUCTIONS
Qty: 17 TABLET | Refills: 0 | Status: SHIPPED | OUTPATIENT
Start: 2017-07-03 | End: 2017-08-10

## 2017-07-03 NOTE — TELEPHONE ENCOUNTER
Send Levaquin 750mg daily x 7 days,   Prednisone 10mg Taper # 17   Diagnosis COPD with exacerbation

## 2017-07-03 NOTE — TELEPHONE ENCOUNTER
Pt called with c/o congestion and chest discomfort.  Feels has another sinus infection. Would like antibiotic called in if possible, states has been approximately 1 weeks.  Will call for appointment if worsens.  [facility] uses [facility]

## 2017-08-10 ENCOUNTER — OFFICE VISIT (OUTPATIENT)
Dept: FAMILY MEDICINE CLINIC | Facility: CLINIC | Age: 67
End: 2017-08-10

## 2017-08-10 VITALS
DIASTOLIC BLOOD PRESSURE: 80 MMHG | SYSTOLIC BLOOD PRESSURE: 142 MMHG | HEIGHT: 66 IN | TEMPERATURE: 98.1 F | HEART RATE: 77 BPM | WEIGHT: 168.1 LBS | BODY MASS INDEX: 27.02 KG/M2 | OXYGEN SATURATION: 99 %

## 2017-08-10 DIAGNOSIS — E66.3 OVERWEIGHT (BMI 25.0-29.9): ICD-10-CM

## 2017-08-10 DIAGNOSIS — Z79.899 HIGH RISK MEDICATION USE: Primary | ICD-10-CM

## 2017-08-10 DIAGNOSIS — F17.210 CIGARETTE SMOKER: ICD-10-CM

## 2017-08-10 DIAGNOSIS — J41.0 SIMPLE CHRONIC BRONCHITIS (HCC): ICD-10-CM

## 2017-08-10 DIAGNOSIS — Z91.09 ENVIRONMENTAL ALLERGIES: ICD-10-CM

## 2017-08-10 DIAGNOSIS — J40 BRONCHITIS: ICD-10-CM

## 2017-08-10 LAB
AMPHET+METHAMPHET UR QL: NEGATIVE
BARBITURATES UR QL SCN: NEGATIVE
BENZODIAZ UR QL SCN: NEGATIVE
CANNABINOIDS SERPL QL: NEGATIVE
COCAINE UR QL: NEGATIVE
METHADONE UR QL SCN: NEGATIVE
OPIATES UR QL: NEGATIVE
OXYCODONE UR QL SCN: NEGATIVE

## 2017-08-10 PROCEDURE — 80307 DRUG TEST PRSMV CHEM ANLYZR: CPT | Performed by: FAMILY MEDICINE

## 2017-08-10 PROCEDURE — 99214 OFFICE O/P EST MOD 30 MIN: CPT | Performed by: FAMILY MEDICINE

## 2017-08-10 RX ORDER — PREDNISONE 10 MG/1
10 TABLET ORAL SEE ADMIN INSTRUCTIONS
Qty: 17 TABLET | Refills: 0 | Status: SHIPPED | OUTPATIENT
Start: 2017-08-10 | End: 2017-11-06

## 2017-08-10 RX ORDER — GABAPENTIN 100 MG/1
100 CAPSULE ORAL 2 TIMES DAILY
Qty: 60 CAPSULE | Refills: 3 | Status: SHIPPED | OUTPATIENT
Start: 2017-08-10 | End: 2017-11-06 | Stop reason: SDUPTHER

## 2017-08-10 NOTE — PROGRESS NOTES
Subjective    Yehuda Rutledge is a 66 y.o. overweight white male smoker with COPD, Allergies, H/O Lumbar back surgery, Lumbago/Sciatica, H/O ETOH abuse in remission, and other health problems see PMH/PSH. Pt presents for exam, to discuss health problem, tx plan and F/U plan.    ' Doing well on Neurontin, helps with pain, would like to continue with med. Has nasal congestion, post nasal gtt, cough over last 4 days. No fever, chills, no sore throat. Was working on car, pulled muscle under right arm, seems to be improving'.    Cough   This is a recurrent (Bronchitis) problem. The current episode started in the past 7 days. The problem has been gradually worsening. The problem occurs constantly. The cough is productive of sputum. Associated symptoms include headaches and a sore throat. Pertinent negatives include no chills, fever, myalgias, postnasal drip, rash, shortness of breath or wheezing. The symptoms are aggravated by other (Sinus drainage). Risk factors for lung disease include smoking/tobacco exposure and occupational exposure. He has tried a beta-agonist inhaler for the symptoms. The treatment provided no relief. His past medical history is significant for bronchitis, COPD, environmental allergies and pneumonia.   Wheezing    This is a recurrent problem. The current episode started in the past 7 days. Associated symptoms include coughing, headaches, neck pain and a sore throat. Pertinent negatives include no abdominal pain, chills, diarrhea, fever, rash, shortness of breath or vomiting. He has tried beta agonist inhalers for the symptoms. The treatment provided significant relief. His past medical history is significant for COPD and pneumonia.   Sinusitis   This is a new problem. The current episode started in the past 7 days. The problem has been gradually worsening since onset. The maximum temperature recorded prior to his arrival was 100.4 - 100.9 F. The fever has been present for 1 to 2 days. His pain is at a  severity of 6/10. The pain is moderate. Associated symptoms include congestion, coughing, headaches, neck pain, sinus pressure and a sore throat. Pertinent negatives include no chills or shortness of breath. (L maxillary) Past treatments include oral decongestants. The treatment provided no relief.   Hypertension   This is a chronic problem. The current episode started more than 1 year ago. The problem has been waxing and waning since onset. The problem is controlled. Associated symptoms include headaches and neck pain. Pertinent negatives include no palpitations or shortness of breath. Past treatments include lifestyle changes. The current treatment provides significant improvement.   Upper Extremity Issue   This is a new problem. The current episode started 1 to 4 weeks ago. The problem occurs daily. The problem has been gradually improving. Associated symptoms include congestion, coughing, headaches, neck pain and a sore throat. Pertinent negatives include no abdominal pain, chills, fatigue, fever, myalgias, nausea, rash or vomiting. Associated symptoms comments: Pulled muscle under R arm working on car.. The symptoms are aggravated by twisting and bending. He has tried NSAIDs for the symptoms. The treatment provided mild relief.        The following portions of the patient's history were reviewed and updated as appropriate: allergies, current medications, past family history, past medical history, past social history, past surgical history and problem list.    Review of Systems   Constitutional: Negative for chills, fatigue and fever.   HENT: Positive for congestion, sinus pressure and sore throat. Negative for facial swelling, mouth sores and postnasal drip.    Eyes: Negative for pain, discharge and visual disturbance.   Respiratory: Positive for cough. Negative for chest tightness, shortness of breath and wheezing.    Cardiovascular: Negative for palpitations and leg swelling.   Gastrointestinal: Negative for  abdominal pain, constipation, diarrhea, nausea and vomiting.   Endocrine: Negative.  Negative for cold intolerance and heat intolerance.   Genitourinary: Negative.  Negative for difficulty urinating, hematuria and urgency.   Musculoskeletal: Positive for back pain and neck pain. Negative for myalgias.        Pain under R arm.   Skin: Negative.  Negative for color change, pallor, rash and wound.   Allergic/Immunologic: Positive for environmental allergies. Negative for food allergies and immunocompromised state.   Neurological: Positive for headaches. Negative for tremors and speech difficulty.   Hematological: Negative for adenopathy. Does not bruise/bleed easily.   Psychiatric/Behavioral: Negative for dysphoric mood. The patient is nervous/anxious. The patient is not hyperactive.        Objective   Physical Exam   Constitutional: He is oriented to person, place, and time. He appears well-developed and well-nourished. No distress.   HENT:   Head: Normocephalic and atraumatic.   Right Ear: External ear normal.   Left Ear: External ear normal.   Nose: Nose normal.   Mouth/Throat: Oropharynx is clear and moist. No oropharyngeal exudate.        Eyes: Conjunctivae and EOM are normal. Pupils are equal, round, and reactive to light. Right eye exhibits no discharge. Left eye exhibits no discharge. No scleral icterus.   Neck: Normal range of motion. Neck supple. No JVD present. No tracheal deviation present. No thyromegaly present.   Cardiovascular: Normal rate, regular rhythm, normal heart sounds and intact distal pulses.  Exam reveals no gallop and no friction rub.    No murmur heard.  Pulmonary/Chest: Effort normal. No stridor. No respiratory distress. He has no wheezes. He has no rales. He exhibits no tenderness.   Coarse rhonchi, productive cough tan secretions.    Abdominal: Soft. Bowel sounds are normal. He exhibits no distension and no mass. There is no tenderness. There is no rebound and no guarding. No hernia.    Lymphadenopathy:     He has no cervical adenopathy.   Neurological: He is alert and oriented to person, place, and time. He has normal reflexes. He displays normal reflexes. No cranial nerve deficit. He exhibits normal muscle tone. Coordination normal.   Skin: Skin is warm and dry. No rash noted. He is not diaphoretic. No erythema. No pallor.   Psychiatric: He has a normal mood and affect. His behavior is normal. Judgment and thought content normal.   Nursing note and vitals reviewed.      Assessment/Plan      Yehuda was seen today for hypertension, copd, cough, nasal congestion and pain.    Diagnoses and all orders for this visit:    High risk medication use  -     Cancel: Urine Drug Screen; Future  -     Rapid drug screen, urine    Simple chronic bronchitis    Cigarette smoker    Overweight (BMI 25.0-29.9)    Bronchitis    Environmental allergies    Other orders  -     gabapentin (NEURONTIN) 100 MG capsule; Take 1 capsule by mouth 2 (Two) Times a Day.  -     predniSONE (DELTASONE) 10 MG tablet; Take 1 tablet by mouth See Admin Instructions. Take 1 Tab Tid x3 days, Then 1 Tab Bid x 2 days Then 1 Tab QD x3 days,then D/C      Discussed current health problem list, meds, indications, tx plan, F/U plan. Discussed smoking cessation x 3 mins. RONALDO reviewed. Discussed Neurontin as controlled med, UDS obtained, Contract signed. Discussed F/U plan.           This document has been electronically signed by Buzz Sifuentes MD

## 2017-11-06 ENCOUNTER — OFFICE VISIT (OUTPATIENT)
Dept: FAMILY MEDICINE CLINIC | Facility: CLINIC | Age: 67
End: 2017-11-06

## 2017-11-06 VITALS
HEIGHT: 66 IN | WEIGHT: 172.4 LBS | TEMPERATURE: 97.9 F | BODY MASS INDEX: 27.71 KG/M2 | DIASTOLIC BLOOD PRESSURE: 80 MMHG | OXYGEN SATURATION: 98 % | HEART RATE: 85 BPM | SYSTOLIC BLOOD PRESSURE: 136 MMHG

## 2017-11-06 DIAGNOSIS — Z79.899 HIGH RISK MEDICATION USE: ICD-10-CM

## 2017-11-06 DIAGNOSIS — M54.12 CHRONIC CERVICAL RADICULOPATHY: ICD-10-CM

## 2017-11-06 DIAGNOSIS — J44.9 CHRONIC OBSTRUCTIVE PULMONARY DISEASE, UNSPECIFIED COPD TYPE (HCC): ICD-10-CM

## 2017-11-06 DIAGNOSIS — E66.3 OVERWEIGHT (BMI 25.0-29.9): ICD-10-CM

## 2017-11-06 DIAGNOSIS — G89.29 CHRONIC RIGHT-SIDED LOW BACK PAIN WITH RIGHT-SIDED SCIATICA: ICD-10-CM

## 2017-11-06 DIAGNOSIS — Z91.09 ENVIRONMENTAL ALLERGIES: ICD-10-CM

## 2017-11-06 DIAGNOSIS — F17.210 CIGARETTE SMOKER: ICD-10-CM

## 2017-11-06 DIAGNOSIS — K42.9 UMBILICAL HERNIA WITHOUT OBSTRUCTION AND WITHOUT GANGRENE: Primary | ICD-10-CM

## 2017-11-06 DIAGNOSIS — M54.41 CHRONIC RIGHT-SIDED LOW BACK PAIN WITH RIGHT-SIDED SCIATICA: ICD-10-CM

## 2017-11-06 PROCEDURE — 99214 OFFICE O/P EST MOD 30 MIN: CPT | Performed by: FAMILY MEDICINE

## 2017-11-06 RX ORDER — GABAPENTIN 100 MG/1
100 CAPSULE ORAL 2 TIMES DAILY
Qty: 60 CAPSULE | Refills: 3 | Status: SHIPPED | OUTPATIENT
Start: 2017-11-06 | End: 2018-03-05 | Stop reason: SDUPTHER

## 2017-11-06 RX ORDER — PRAVASTATIN SODIUM 20 MG
20 TABLET ORAL NIGHTLY
Qty: 30 TABLET | Refills: 5 | Status: SHIPPED | OUTPATIENT
Start: 2017-11-06 | End: 2019-01-24 | Stop reason: SDUPTHER

## 2017-11-06 RX ORDER — BROMPHENIRAMINE MALEATE, PSEUDOEPHEDRINE HYDROCHLORIDE, AND DEXTROMETHORPHAN HYDROBROMIDE 2; 30; 10 MG/5ML; MG/5ML; MG/5ML
5 SYRUP ORAL 4 TIMES DAILY PRN
Qty: 473 ML | Refills: 1 | Status: SHIPPED | OUTPATIENT
Start: 2017-11-06 | End: 2018-05-23 | Stop reason: SDUPTHER

## 2017-11-06 NOTE — PATIENT INSTRUCTIONS
Steps to Quit Smoking   Smoking tobacco can be harmful to your health and can affect almost every organ in your body. Smoking puts you, and those around you, at risk for developing many serious chronic diseases. Quitting smoking is difficult, but it is one of the best things that you can do for your health. It is never too late to quit.  WHAT ARE THE BENEFITS OF QUITTING SMOKING?  When you quit smoking, you lower your risk of developing serious diseases and conditions, such as:  · Lung cancer or lung disease, such as COPD.  · Heart disease.  · Stroke.  · Heart attack.  · Infertility.  · Osteoporosis and bone fractures.  Additionally, symptoms such as coughing, wheezing, and shortness of breath may get better when you quit. You may also find that you get sick less often because your body is stronger at fighting off colds and infections. If you are pregnant, quitting smoking can help to reduce your chances of having a baby of low birth weight.  HOW DO I GET READY TO QUIT?  When you decide to quit smoking, create a plan to make sure that you are successful. Before you quit:  · Pick a date to quit. Set a date within the next two weeks to give you time to prepare.  · Write down the reasons why you are quitting. Keep this list in places where you will see it often, such as on your bathroom mirror or in your car or wallet.  · Identify the people, places, things, and activities that make you want to smoke (triggers) and avoid them. Make sure to take these actions:    Throw away all cigarettes at home, at work, and in your car.    Throw away smoking accessories, such as ashtrays and lighters.    Clean your car and make sure to empty the ashtray.    Clean your home, including curtains and carpets.  · Tell your family, friends, and coworkers that you are quitting. Support from your loved ones can make quitting easier.  · Talk with your health care provider about your options for quitting smoking.  · Find out what treatment  "options are covered by your health insurance.  WHAT STRATEGIES CAN I USE TO QUIT SMOKING?   Talk with your healthcare provider about different strategies to quit smoking. Some strategies include:  · Quitting smoking altogether instead of gradually lessening how much you smoke over a period of time. Research shows that quitting \"cold turkey\" is more successful than gradually quitting.  · Attending in-person counseling to help you build problem-solving skills. You are more likely to have success in quitting if you attend several counseling sessions. Even short sessions of 10 minutes can be effective.  · Finding resources and support systems that can help you to quit smoking and remain smoke-free after you quit. These resources are most helpful when you use them often. They can include:    Online chats with a counselor.    Telephone quitlines.    Printed self-help materials.    Support groups or group counseling.    Text messaging programs.    Mobile phone applications.  · Taking medicines to help you quit smoking. (If you are pregnant or breastfeeding, talk with your health care provider first.) Some medicines contain nicotine and some do not. Both types of medicines help with cravings, but the medicines that include nicotine help to relieve withdrawal symptoms. Your health care provider may recommend:    Nicotine patches, gum, or lozenges.    Nicotine inhalers or sprays.    Non-nicotine medicine that is taken by mouth.  Talk with your health care provider about combining strategies, such as taking medicines while you are also receiving in-person counseling. Using these two strategies together makes you more likely to succeed in quitting than if you used either strategy on its own.  If you are pregnant or breastfeeding, talk with your health care provider about finding counseling or other support strategies to quit smoking. Do not take medicine to help you quit smoking unless told to do so by your health care " provider.  WHAT THINGS CAN I DO TO MAKE IT EASIER TO QUIT?  Quitting smoking might feel overwhelming at first, but there is a lot that you can do to make it easier. Take these important actions:  · Reach out to your family and friends and ask that they support and encourage you during this time. Call telephone quitlines, reach out to support groups, or work with a counselor for support.  · Ask people who smoke to avoid smoking around you.  · Avoid places that trigger you to smoke, such as bars, parties, or smoke-break areas at work.  · Spend time around people who do not smoke.  · Lessen stress in your life, because stress can be a smoking trigger for some people. To lessen stress, try:    Exercising regularly.    Deep-breathing exercises.    Yoga.    Meditating.    Performing a body scan. This involves closing your eyes, scanning your body from head to toe, and noticing which parts of your body are particularly tense. Purposefully relax the muscles in those areas.  · Download or purchase mobile phone or tablet apps (applications) that can help you stick to your quit plan by providing reminders, tips, and encouragement. There are many free apps, such as QuitGuide from the CDC (Centers for Disease Control and Prevention). You can find other support for quitting smoking (smoking cessation) through smokefree.gov and other websites.  HOW WILL I FEEL WHEN I QUIT SMOKING?  Within the first 24 hours of quitting smoking, you may start to feel some withdrawal symptoms. These symptoms are usually most noticeable 2-3 days after quitting, but they usually do not last beyond 2-3 weeks. Changes or symptoms that you might experience include:  · Mood swings.  · Restlessness, anxiety, or irritation.  · Difficulty concentrating.  · Dizziness.  · Strong cravings for sugary foods in addition to nicotine.  · Mild weight gain.  · Constipation.  · Nausea.  · Coughing or a sore throat.  · Changes in how your medicines work in your  body.  · A depressed mood.  · Difficulty sleeping (insomnia).  After the first 2-3 weeks of quitting, you may start to notice more positive results, such as:  · Improved sense of smell and taste.  · Decreased coughing and sore throat.  · Slower heart rate.  · Lower blood pressure.  · Clearer skin.  · The ability to breathe more easily.  · Fewer sick days.  Quitting smoking is very challenging for most people. Do not get discouraged if you are not successful the first time. Some people need to make many attempts to quit before they achieve long-term success. Do your best to stick to your quit plan, and talk with your health care provider if you have any questions or concerns.     This information is not intended to replace advice given to you by your health care provider. Make sure you discuss any questions you have with your health care provider.     Document Released: 12/12/2002 Document Revised: 05/03/2016 Document Reviewed: 05/03/2016  [employer] Interactive Patient Education ©2017 [employer]  Preventive care refers to lifestyle choices and visits with your health care provider that can promote health and wellness.  WHAT DOES PREVENTIVE CARE INCLUDE?  · A yearly physical exam. This is also called an annual well check.  · Dental exams once or twice a year.  · Routine eye exams. Ask your health care provider how often you should have your eyes checked.  · Personal lifestyle choices, including:    Daily care of your teeth and gums.    Regular physical activity.    Eating a healthy diet.    Avoiding tobacco and drug use.    Limiting alcohol use.    Practicing safe sex.    Taking low doses of aspirin every day.    Taking vitamin and mineral supplements as recommended by your health care provider.  WHAT HAPPENS DURING AN ANNUAL WELL CHECK?  The services and screenings done by your health care provider during your annual well check will depend on your age, overall health, lifestyle risk factors, and family history of  disease.  Counseling  Your health care provider may ask you questions about your:  · Alcohol use.  · Tobacco use.  · Drug use.  · Emotional well-being.  · Home and relationship well-being.  · Sexual activity.  · Eating habits.  · History of falls.  · Memory and ability to understand (cognition).  · Work and work environment.  Screening  You may have the following tests or measurements:  · Height, weight, and BMI.  · Blood pressure.  · Lipid and cholesterol levels. These may be checked every 5 years, or more frequently if you are over 50 years old.  · Skin check.  · Lung cancer screening. You may have this screening every year starting at age 55 if you have a 30-pack-year history of smoking and currently smoke or have quit within the past 15 years.  · Fecal occult blood test (FOBT) of the stool. You may have this test every year starting at age 50.  · Flexible sigmoidoscopy or colonoscopy. You may have a sigmoidoscopy every 5 years or a colonoscopy every 10 years starting at age 50.  · Prostate cancer screening. Recommendations will vary depending on your family history and other risks.  · Hepatitis C blood test.  · Hepatitis B blood test.  · Sexually transmitted disease (STD) testing.  · Diabetes screening. This is done by checking your blood sugar (glucose) after you have not eaten for a while (fasting). You may have this done every 1-3 years.  · Abdominal aortic aneurysm (AAA) screening. You may need this if you are a current or former smoker.  · Osteoporosis. You may be screened starting at age 70 if you are at high risk.  Talk with your health care provider about your test results, treatment options, and if necessary, the need for more tests.  Vaccines  Your health care provider may recommend certain vaccines, such as:  · Influenza vaccine. This is recommended every year.  · Tetanus, diphtheria, and acellular pertussis (Tdap, Td) vaccine. You may need a Td booster every 10 years.  · Varicella vaccine. You may  need this if you have not been vaccinated.  · Zoster vaccine. You may need this after age 60.  · Measles, mumps, and rubella (MMR) vaccine. You may need at least one dose of MMR if you were born in 1957 or later. You may also need a second dose.  · Pneumococcal 13-valent conjugate (PCV13) vaccine. One dose is recommended after age 65.  · Pneumococcal polysaccharide (PPSV23) vaccine. One dose is recommended after age 65.  · Meningococcal vaccine. You may need this if you have certain conditions.  · Hepatitis A vaccine. You may need this if you have certain conditions or if you travel or work in places where you may be exposed to hepatitis A.  · Hepatitis B vaccine. You may need this if you have certain conditions or if you travel or work in places where you may be exposed to hepatitis B.  · Haemophilus influenzae type b (Hib) vaccine. You may need this if you have certain risk factors.  Talk to your health care provider about which screenings and vaccines you need and how often you need them.     This information is not intended to replace advice given to you by your health care provider. Make sure you discuss any questions you have with your health care provider.     Document Released: 01/13/2017 Document Reviewed: 01/13/2017  [employer] Interactive Patient Education ©2017 [employer]  Hernia, Adult  A hernia is the bulging of an organ or tissue through a weak spot in the muscles of the abdomen (abdominal wall). Hernias develop most often near the navel or groin.  There are many kinds of hernias. Common kinds include:  · Femoral hernia. This kind of hernia develops under the groin in the upper thigh area.  · Inguinal hernia. This kind of hernia develops in the groin or scrotum.  · Umbilical hernia. This kind of hernia develops near the navel.  · Hiatal hernia. This kind of hernia causes part of the stomach to be pushed up into the chest.  · Incisional hernia. This kind of hernia bulges through a scar from an  abdominal surgery.  CAUSES  This condition may be caused by:  · Heavy lifting.  · Coughing over a long period of time.  · Straining to have a bowel movement.  · An incision made during an abdominal surgery.  · A birth defect (congenital defect).  · Excess weight or obesity.  · Smoking.  · Poor nutrition.  · Cystic fibrosis.  · Excess fluid in the abdomen.  · Undescended testicles.  SYMPTOMS  Symptoms of a hernia include:  · A lump on the abdomen. This is the first sign of a hernia. The lump may become more obvious with standing, straining, or coughing. It may get bigger over time if it is not treated or if the condition causing it is not treated.  · Pain. A hernia is usually painless, but it may become painful over time if treatment is delayed. The pain is usually dull and may get worse with standing or lifting heavy objects.  Sometimes a hernia gets tightly squeezed in the weak spot (strangulated) or stuck there (incarcerated) and causes additional symptoms. These symptoms may include:  · Vomiting.  · Nausea.  · Constipation.  · Irritability.  DIAGNOSIS  A hernia may be diagnosed with:  · A physical exam. During the exam your health care provider may ask you to cough or to make a specific movement, because a hernia is usually more visible when you move.  · Imaging tests. These can include:    X-rays.    Ultrasound.    CT scan.  TREATMENT  A hernia that is small and painless may not need to be treated. A hernia that is large or painful may be treated with surgery. Inguinal hernias may be treated with surgery to prevent incarceration or strangulation. Strangulated hernias are always treated with surgery, because lack of blood to the trapped organ or tissue can cause it to die.  Surgery to treat a hernia involves pushing the bulge back into place and repairing the weak part of the abdomen.  HOME CARE INSTRUCTIONS  · Avoid straining.  · Do not lift anything heavier than 10 lb (4.5 kg).  · Lift with your leg muscles,  not your back muscles. This helps avoid strain.  · When coughing, try to cough gently.  · Prevent constipation. Constipation leads to straining with bowel movements, which can make a hernia worse or cause a hernia repair to break down. You can prevent constipation by:    Eating a high-fiber diet that includes plenty of fruits and vegetables.    Drinking enough fluids to keep your urine clear or pale yellow. Aim to drink 6-8 glasses of water per day.    Using a stool softener as directed by your health care provider.  · Lose weight, if you are overweight.  · Do not use any tobacco products, including cigarettes, chewing tobacco, or electronic cigarettes. If you need help quitting, ask your health care provider.  · Keep all follow-up visits as directed by your health care provider. This is important. Your health care provider may need to monitor your condition.  SEEK MEDICAL CARE IF:  · You have swelling, redness, and pain in the affected area.  · Your bowel habits change.  SEEK IMMEDIATE MEDICAL CARE IF:  · You have a fever.  · You have abdominal pain that is getting worse.  · You feel nauseous or you vomit.  · You cannot push the hernia back in place by gently pressing on it while you are lying down.  · The hernia:    Changes in shape or size.    Is stuck outside the abdomen.    Becomes discolored.    Feels hard or tender.     This information is not intended to replace advice given to you by your health care provider. Make sure you discuss any questions you have with your health care provider.     Document Released: 12/18/2006 Document Revised: 01/08/2016 Document Reviewed: 10/28/2015  [employer] Interactive Patient Education ©2017 [employer]

## 2017-11-06 NOTE — PROGRESS NOTES
Subjective   Yehuda Rutledge is a 67 y.o. overweight white male smoker with COPD, Allergies, H/O Lumbar back surgery, Lumbago/Sciatica, H/O ETOH abuse in remission, Abd Hernia, and other health problems see PMH/PSH. Pt presents for exam, to discuss health problem, tx plan and F/U plan.     ' Back pain has been controlled with Neurontin, needs refills. Breathing problems have not flared up, since last tx with Abx.  Hernia, has been bulging, getting sore and hurting, usually if strains at all working. 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 B/P has been good on checks. Has not been able to quit smoking'.    Wheezing    This is a recurrent problem. The current episode started in the past 7 days. Associated symptoms include abdominal pain and neck pain. Pertinent negatives include no chills, coughing, diarrhea, fever, headaches, rash, shortness of breath, sore throat or vomiting. He has tried beta agonist inhalers for the symptoms. The treatment provided significant relief. COPD, Diverticular disease   Hypertension   This is a chronic problem. The current episode started more than 1 year ago. The problem has been waxing and waning since onset. The problem is controlled. Associated symptoms include neck pain. Pertinent negatives include no headaches, palpitations or shortness of breath. Past treatments include lifestyle changes. The current treatment provides significant improvement.   Abdominal Pain   This is a chronic (Incisional hernia R i and supra umbilical) problem. The current episode started more than 1 month ago. The problem has been waxing and waning. The pain is located in the periumbilical region. The pain is at a severity of 3/10. The pain is mild. The quality of the pain is aching and burning. Pertinent negatives include no constipation, diarrhea, fever, headaches, hematuria, myalgias, nausea or vomiting. Associated symptoms comments: Pain, Soreness. The pain is aggravated  by coughing, movement and bowel movement. The pain is relieved by certain positions. He has tried nothing for the symptoms. The treatment provided no relief. His past medical history is significant for abdominal surgery. COPD, Diverticular disease        The following portions of the patient's history were reviewed and updated as appropriate: allergies, current medications, past family history, past medical history, past social history, past surgical history and problem list.    Review of Systems   Constitutional: Negative for chills, fatigue and fever.   HENT: Negative for congestion, facial swelling, mouth sores, postnasal drip, sinus pressure and sore throat.    Eyes: Negative for pain, discharge and visual disturbance.   Respiratory: Negative for cough, chest tightness, shortness of breath and wheezing.    Cardiovascular: Negative for palpitations and leg swelling.   Gastrointestinal: Positive for abdominal pain. Negative for constipation, diarrhea, nausea and vomiting.        Abd Hernia getting worse.   Endocrine: Negative.  Negative for cold intolerance and heat intolerance.   Genitourinary: Negative.  Negative for difficulty urinating, hematuria and urgency.   Musculoskeletal: Positive for back pain and neck pain. Negative for myalgias.        Neck and Back pain controlled with Neurontin   Skin: Negative.  Negative for color change, pallor, rash and wound.   Allergic/Immunologic: Positive for environmental allergies. Negative for food allergies and immunocompromised state.   Neurological: Negative for tremors, speech difficulty and headaches.   Hematological: Negative for adenopathy. Does not bruise/bleed easily.   Psychiatric/Behavioral: Negative for dysphoric mood. The patient is nervous/anxious. The patient is not hyperactive.        Objective   Physical Exam   Constitutional: He is oriented to person, place, and time. He appears well-developed and well-nourished. No distress.   HENT:   Head: Normocephalic  and atraumatic.   Right Ear: External ear normal.   Left Ear: External ear normal.   Nose: Nose normal.   Mouth/Throat: Oropharynx is clear and moist. No oropharyngeal exudate.        Eyes: Conjunctivae and EOM are normal. Pupils are equal, round, and reactive to light. Right eye exhibits no discharge. Left eye exhibits no discharge. No scleral icterus.   Neck: Neck supple. No JVD present. No tracheal deviation present. No thyromegaly present.   Has Full cervical ROM, WB 2.   Cardiovascular: Normal rate, regular rhythm, normal heart sounds and intact distal pulses.  Exam reveals no gallop and no friction rub.    No murmur heard.  Pulmonary/Chest: Effort normal. No stridor. No respiratory distress. He has no wheezes. He has no rales. He exhibits no tenderness.   Abdominal: Soft. Bowel sounds are normal. He exhibits no distension and no mass. There is no tenderness. There is no rebound and no guarding. A hernia is present.   Has reducible golf ball sized Hernia R supraumbilical area.  Otherwise abd benign today.   Musculoskeletal: He exhibits no edema, tenderness or deformity.   Has WB 2- with ROM back , worse with extension, than flexion or rotation.   Lymphadenopathy:     He has no cervical adenopathy.   Neurological: He is alert and oriented to person, place, and time. He has normal reflexes. He displays normal reflexes. No cranial nerve deficit. He exhibits normal muscle tone. Coordination normal.   Skin: Skin is warm and dry. No rash noted. He is not diaphoretic. No erythema. No pallor.   Psychiatric: He has a normal mood and affect. His behavior is normal. Judgment and thought content normal.   Nursing note and vitals reviewed.      Assessment/Plan   Yehuda was seen today for hypertension, allergies, copd and mass.    Diagnoses and all orders for this visit:    Umbilical hernia without obstruction and without gangrene  -     Ambulatory Referral to General Surgery    Chronic obstructive pulmonary disease,  unspecified COPD type    Chronic cervical radiculopathy    Chronic right-sided low back pain with right-sided sciatica    Cigarette smoker    Overweight (BMI 25.0-29.9)    Environmental allergies    High risk medication use    Other orders  -     brompheniramine-pseudoephedrine-DM 30-2-10 MG/5ML syrup; Take 5 mL by mouth 4 (Four) Times a Day As Needed for Allergies.  -     pravastatin (PRAVACHOL) 20 MG tablet; Take 1 tablet by mouth Every Night.  -     gabapentin (NEURONTIN) 100 MG capsule; Take 1 capsule by mouth 2 (Two) Times a Day.      Discussed current health problem list, meds, indications, tx plan,rationale. Discussed smoking cessation x 3 mins. Discussed Neurontin as a controlled med, safety with controlled meds, RONALDO reviewed.  Discussed USPSTF recommendations. Discussed referral to Surgeon , lizet for hernia repair.   Discussed F/U here.          This document has been electronically signed by Buzz Sifuentes MD on November 6, 2017

## 2017-11-08 ENCOUNTER — CONSULT (OUTPATIENT)
Dept: SURGERY | Facility: CLINIC | Age: 67
End: 2017-11-08

## 2017-11-08 VITALS
SYSTOLIC BLOOD PRESSURE: 132 MMHG | HEIGHT: 66 IN | BODY MASS INDEX: 26.68 KG/M2 | WEIGHT: 166 LBS | DIASTOLIC BLOOD PRESSURE: 70 MMHG

## 2017-11-08 DIAGNOSIS — Z01.812 PRE-PROCEDURAL LABORATORY EXAMINATION: Primary | ICD-10-CM

## 2017-11-08 DIAGNOSIS — K43.2 INCISIONAL HERNIA, WITHOUT OBSTRUCTION OR GANGRENE: Primary | ICD-10-CM

## 2017-11-08 PROCEDURE — 99203 OFFICE O/P NEW LOW 30 MIN: CPT | Performed by: SURGERY
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
## 2017-11-13 ENCOUNTER — LAB (OUTPATIENT)
Dept: LAB | Facility: CLINIC | Age: 67
End: 2017-11-13

## 2017-11-13 DIAGNOSIS — Z01.812 PRE-PROCEDURAL LABORATORY EXAMINATION: ICD-10-CM

## 2017-11-13 LAB
BUN BLD-MCNC: 12 MG/DL (ref 7–21)
CREAT BLD-MCNC: 0.95 MG/DL (ref 0.7–1.3)
GFR SERPL CREATININE-BSD FRML MDRD: 80 ML/MIN/1.73 (ref 49–113)

## 2017-11-13 PROCEDURE — 84520 ASSAY OF UREA NITROGEN: CPT | Performed by: SURGERY

## 2017-11-13 PROCEDURE — 82565 ASSAY OF CREATININE: CPT | Performed by: SURGERY

## 2017-12-06 ENCOUNTER — OFFICE VISIT (OUTPATIENT)
Dept: SURGERY | Facility: CLINIC | Age: 67
End: 2017-12-06

## 2017-12-06 VITALS
BODY MASS INDEX: 27.32 KG/M2 | HEIGHT: 66 IN | SYSTOLIC BLOOD PRESSURE: 140 MMHG | DIASTOLIC BLOOD PRESSURE: 60 MMHG | WEIGHT: 170 LBS

## 2017-12-06 DIAGNOSIS — K43.2 INCISIONAL HERNIA, WITHOUT OBSTRUCTION OR GANGRENE: Primary | ICD-10-CM

## 2017-12-06 DIAGNOSIS — K42.9 UMBILICAL HERNIA WITHOUT OBSTRUCTION AND WITHOUT GANGRENE: ICD-10-CM

## 2017-12-06 PROCEDURE — 99213 OFFICE O/P EST LOW 20 MIN: CPT | Performed by: SURGERY

## 2017-12-06 RX ORDER — LEVOFLOXACIN 5 MG/ML
500 INJECTION, SOLUTION INTRAVENOUS ONCE
Status: CANCELLED | OUTPATIENT
Start: 2018-01-11 | End: 2017-12-06
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
## 2017-12-07 NOTE — TELEPHONE ENCOUNTER
----- Message from Cinthya Truong LPN sent at 2/13/2017  7:54 AM CST -----      ----- Message -----     From: Buzz Sifuentes MD     Sent: 2/11/2017   9:40 AM       To: Cinthya Truong LPN    Labs OK, will go over at next appt.   December 7, 2017      Sonali Ramirez  [address] [facility]        Dear Ms. Kelby Martinez,    The results of your tests including the biopsy/tissue pathology have returned and indicate the following:  Esophagitis  Gastritis    Esophagitis  Esophagitis is an irritation in the lining of the esophagus (food pipe). Gastritis  Gastritis is an inflammation of the stomach lining. The most common symptoms are stomach upset or pain. You may also experience belching, abdominal bloating, nausea, vomiting, feeling of fullness, or burning in your stomach. If you see blood in your vomit or stool, your stomach lining may be bleeding a bit. Gastritis is usually treated by taking antacids or other mediations to reduce stomach acid and thereby help relieve symptoms and promote healing of the stomach lining. You will also need to avoid foods that may cause irritation. If your gastritis is related to an illness or infection, that problem may be treated separately with the use of antibiotics. Once the illness or infection resolves, the gastritis usually heals as well. REFLUX CONTROL RECOMMENDATIONS:  The following suggestions for lifestyle changes may help you manage and/or minimize your symptoms and prevent further damage to the lining of your esophagus and/or stomach:  Avoid foods if they cause you symptoms. Food that may aggravate symptoms include: spicy, fatty foods, tomato and citrus juices, chocolate, mints, coffee, tea, louis, and alcoholic beverages. Limit coffee each day. Limiting consumption of other caffeine containing beverages such as tea and soft drinks may also be helpful. Stop smoking. If you cannot stop smoking, decreasing the number of cigarettes you smoke may help. Avoid tight clothing. Tight belts, pants, and girdles can increase the pressure on the abdomen, which can aggravate reflux symptoms. Eat smaller, more frequent meals.  Don't overfill your stomach. Avoid eating before bed. Do not lie down for at least 2 hours after eating. When you do lie down, elevate the head of your bed. Continue to take medications prescribed to suppress the stomach's acid production and decrease reflux into the esophagus. Â· Other Information:  Continue your antacid medication(s) as prescribed. Follow-up in the office if symptoms persist.    It has been a pleasure caring for you. If you have questions regarding these results or your plan of care, please feel free to contact us. You may either call to discuss these issues over the phone or schedule a follow-up office visit.       Sincerely,        Dr. Pauly Pritchett  Gastroenterology  [address]  [phone]

## 2018-01-04 ENCOUNTER — APPOINTMENT (OUTPATIENT)
Dept: PREADMISSION TESTING | Facility: HOSPITAL | Age: 68
End: 2018-01-04

## 2018-01-04 VITALS
DIASTOLIC BLOOD PRESSURE: 74 MMHG | HEART RATE: 90 BPM | SYSTOLIC BLOOD PRESSURE: 126 MMHG | HEIGHT: 65 IN | WEIGHT: 172 LBS | BODY MASS INDEX: 28.66 KG/M2 | OXYGEN SATURATION: 98 %

## 2018-01-04 LAB — MRSA DNA SPEC QL NAA+PROBE: NEGATIVE

## 2018-01-04 PROCEDURE — 87641 MR-STAPH DNA AMP PROBE: CPT | Performed by: SURGERY

## 2018-01-04 RX ORDER — SODIUM CHLORIDE, SODIUM GLUCONATE, SODIUM ACETATE, POTASSIUM CHLORIDE AND MAGNESIUM CHLORIDE 526; 502; 368; 37; 30 MG/100ML; MG/100ML; MG/100ML; MG/100ML; MG/100ML
1000 INJECTION, SOLUTION INTRAVENOUS CONTINUOUS
Status: CANCELLED | OUTPATIENT
Start: 2018-01-11

## 2018-01-04 NOTE — DISCHARGE INSTRUCTIONS
[facility]  Pre-op Information and Guidelines    You will be called after 2 p.m. the day before your surgery (Friday for Monday surgery) and notified of your time for arrival and approximate surgery time.  If you have not received a call by 4P.M., please contact Same Day Surgery at [phone] of if outside [facility] call [phone].    Please Follow these Important Safety Guidelines:    • The morning of your procedure, take only the medications listed below with   A sip of water:__GABAPENTIN, INHALER, FLONASE,_________________       __ALLERGY MEDICATION___________________________      • DO NOT eat or drink anything after 12:00 midnight the night before surgery  Specific instructions concerning drinking clear liquids will be discussed during  the pre-surgery instruction call the day before your surgery.    • If you take a blood thinner (ex. Plavix, Coumadin, aspirin), ask your doctor when to stop it before surgery  STOP DATE: _________________    • Only 2 visitors are allowed in patient rooms at a time  Your visitors will be asked to wait in the lobby until the admission process is complete with the exception of a parent with a child and patients in need of special assistance.    • YOU CANNOT DRIVE YOURSELF HOME  You must be accompanied by someone who will be responsible for driving you home after surgery and for your care at home.    • DO NOT chew gum, use breath mints, hard candy, or smoke the day of surgery  • DO NOT drink alcohol for at least 24 hours before your surgery  • DO NOT wear any jewelry and remove all body piercing before coming to the hospital  • DO NOT wear make-up to the hospital  • If you are having surgery on an extremity (arm/leg/foot) remove nail polish/artificial nails on the surgical side  • Clothing, glasses, contacts, dentures, and hairpieces must be removed before surgery  • Bathe the night before or the morning of your surgery and do not use powders/lotions  on skin.

## 2018-01-11 ENCOUNTER — ANESTHESIA EVENT (OUTPATIENT)
Dept: PERIOP | Facility: HOSPITAL | Age: 68
End: 2018-01-11

## 2018-01-11 ENCOUNTER — HOSPITAL ENCOUNTER (OUTPATIENT)
Facility: HOSPITAL | Age: 68
Setting detail: HOSPITAL OUTPATIENT SURGERY
Discharge: HOME | End: 2018-01-11
Attending: SURGERY | Admitting: SURGERY

## 2018-01-11 ENCOUNTER — ANESTHESIA (OUTPATIENT)
Dept: PERIOP | Facility: HOSPITAL | Age: 68
End: 2018-01-11

## 2018-01-11 VITALS
WEIGHT: 167.77 LBS | HEIGHT: 66 IN | BODY MASS INDEX: 26.96 KG/M2 | OXYGEN SATURATION: 95 % | RESPIRATION RATE: 20 BRPM | TEMPERATURE: 97.8 F | SYSTOLIC BLOOD PRESSURE: 150 MMHG | HEART RATE: 97 BPM | DIASTOLIC BLOOD PRESSURE: 62 MMHG

## 2018-01-11 DIAGNOSIS — K43.2 INCISIONAL HERNIA, WITHOUT OBSTRUCTION OR GANGRENE: ICD-10-CM

## 2018-01-11 PROCEDURE — 25010000002 PROPOFOL 10 MG/ML EMULSION: Performed by: NURSE ANESTHETIST, CERTIFIED REGISTERED

## 2018-01-11 PROCEDURE — 49655 PR LAP, INCISIONAL HERNIA REPAIR,INCARCERATED: CPT | Performed by: SURGERY

## 2018-01-11 PROCEDURE — 25010000002 ONDANSETRON PER 1 MG: Performed by: NURSE ANESTHETIST, CERTIFIED REGISTERED

## 2018-01-11 PROCEDURE — 25010000002 LEVOFLOXACIN PER 250 MG: Performed by: SURGERY

## 2018-01-11 PROCEDURE — C1781 MESH (IMPLANTABLE): HCPCS | Performed by: SURGERY

## 2018-01-11 PROCEDURE — 25010000002 PHENYLEPHRINE PER 1 ML: Performed by: NURSE ANESTHETIST, CERTIFIED REGISTERED

## 2018-01-11 PROCEDURE — 94640 AIRWAY INHALATION TREATMENT: CPT

## 2018-01-11 PROCEDURE — 25010000002 MIDAZOLAM PER 1 MG: Performed by: NURSE ANESTHETIST, CERTIFIED REGISTERED

## 2018-01-11 PROCEDURE — 25010000002 FENTANYL CITRATE (PF) 100 MCG/2ML SOLUTION: Performed by: NURSE ANESTHETIST, CERTIFIED REGISTERED

## 2018-01-11 DEVICE — VENTRALIGHT ST MESH
Type: IMPLANTABLE DEVICE | Site: ABDOMEN | Status: FUNCTIONAL
Brand: VENTRALIGHT ST

## 2018-01-11 RX ORDER — TAMSULOSIN HYDROCHLORIDE 0.4 MG/1
0.4 CAPSULE ORAL ONCE
Status: COMPLETED | OUTPATIENT
Start: 2018-01-11 | End: 2018-01-11

## 2018-01-11 RX ORDER — EPHEDRINE SULFATE 50 MG/ML
5 INJECTION, SOLUTION INTRAVENOUS ONCE AS NEEDED
Status: DISCONTINUED | OUTPATIENT
Start: 2018-01-11 | End: 2018-01-11 | Stop reason: HOSPADM

## 2018-01-11 RX ORDER — ALBUTEROL SULFATE 2.5 MG/3ML
2.5 SOLUTION RESPIRATORY (INHALATION) ONCE
Status: DISCONTINUED | OUTPATIENT
Start: 2018-01-11 | End: 2018-01-11 | Stop reason: HOSPADM

## 2018-01-11 RX ORDER — FENTANYL CITRATE 50 UG/ML
INJECTION, SOLUTION INTRAMUSCULAR; INTRAVENOUS AS NEEDED
Status: DISCONTINUED | OUTPATIENT
Start: 2018-01-11 | End: 2018-01-11 | Stop reason: SURG

## 2018-01-11 RX ORDER — PROPOFOL 10 MG/ML
VIAL (ML) INTRAVENOUS AS NEEDED
Status: DISCONTINUED | OUTPATIENT
Start: 2018-01-11 | End: 2018-01-11 | Stop reason: SURG

## 2018-01-11 RX ORDER — BUPIVACAINE HYDROCHLORIDE AND EPINEPHRINE 2.5; 5 MG/ML; UG/ML
INJECTION, SOLUTION EPIDURAL; INFILTRATION; INTRACAUDAL; PERINEURAL AS NEEDED
Status: DISCONTINUED | OUTPATIENT
Start: 2018-01-11 | End: 2018-01-11 | Stop reason: HOSPADM

## 2018-01-11 RX ORDER — NALOXONE HCL 0.4 MG/ML
0.2 VIAL (ML) INJECTION AS NEEDED
Status: DISCONTINUED | OUTPATIENT
Start: 2018-01-11 | End: 2018-01-11 | Stop reason: HOSPADM

## 2018-01-11 RX ORDER — DIPHENHYDRAMINE HYDROCHLORIDE 50 MG/ML
12.5 INJECTION INTRAMUSCULAR; INTRAVENOUS
Status: DISCONTINUED | OUTPATIENT
Start: 2018-01-11 | End: 2018-01-11 | Stop reason: HOSPADM

## 2018-01-11 RX ORDER — ONDANSETRON 2 MG/ML
4 INJECTION INTRAMUSCULAR; INTRAVENOUS ONCE AS NEEDED
Status: DISCONTINUED | OUTPATIENT
Start: 2018-01-11 | End: 2018-01-11 | Stop reason: HOSPADM

## 2018-01-11 RX ORDER — FLUMAZENIL 0.1 MG/ML
0.2 INJECTION INTRAVENOUS AS NEEDED
Status: DISCONTINUED | OUTPATIENT
Start: 2018-01-11 | End: 2018-01-11 | Stop reason: HOSPADM

## 2018-01-11 RX ORDER — SODIUM CHLORIDE, SODIUM GLUCONATE, SODIUM ACETATE, POTASSIUM CHLORIDE AND MAGNESIUM CHLORIDE 526; 502; 368; 37; 30 MG/100ML; MG/100ML; MG/100ML; MG/100ML; MG/100ML
1000 INJECTION, SOLUTION INTRAVENOUS CONTINUOUS
Status: DISCONTINUED | OUTPATIENT
Start: 2018-01-11 | End: 2018-01-11 | Stop reason: HOSPADM

## 2018-01-11 RX ORDER — ROCURONIUM BROMIDE 10 MG/ML
INJECTION, SOLUTION INTRAVENOUS AS NEEDED
Status: DISCONTINUED | OUTPATIENT
Start: 2018-01-11 | End: 2018-01-11 | Stop reason: SURG

## 2018-01-11 RX ORDER — ONDANSETRON 2 MG/ML
INJECTION INTRAMUSCULAR; INTRAVENOUS AS NEEDED
Status: DISCONTINUED | OUTPATIENT
Start: 2018-01-11 | End: 2018-01-11 | Stop reason: SURG

## 2018-01-11 RX ORDER — HYDROCODONE BITARTRATE AND ACETAMINOPHEN 10; 325 MG/1; MG/1
1 TABLET ORAL EVERY 6 HOURS PRN
Qty: 30 TABLET | Refills: 0 | Status: SHIPPED | OUTPATIENT
Start: 2018-01-11 | End: 2018-03-05

## 2018-01-11 RX ORDER — ACETAMINOPHEN 650 MG/1
650 SUPPOSITORY RECTAL ONCE AS NEEDED
Status: DISCONTINUED | OUTPATIENT
Start: 2018-01-11 | End: 2018-01-11 | Stop reason: HOSPADM

## 2018-01-11 RX ORDER — ALBUTEROL SULFATE 2.5 MG/3ML
2.5 SOLUTION RESPIRATORY (INHALATION) ONCE
Status: COMPLETED | OUTPATIENT
Start: 2018-01-11 | End: 2018-01-11

## 2018-01-11 RX ORDER — ACETAMINOPHEN 325 MG/1
650 TABLET ORAL ONCE AS NEEDED
Status: DISCONTINUED | OUTPATIENT
Start: 2018-01-11 | End: 2018-01-11 | Stop reason: HOSPADM

## 2018-01-11 RX ORDER — LEVOFLOXACIN 5 MG/ML
500 INJECTION, SOLUTION INTRAVENOUS ONCE
Status: COMPLETED | OUTPATIENT
Start: 2018-01-11 | End: 2018-01-11

## 2018-01-11 RX ORDER — MIDAZOLAM HYDROCHLORIDE 1 MG/ML
INJECTION INTRAMUSCULAR; INTRAVENOUS AS NEEDED
Status: DISCONTINUED | OUTPATIENT
Start: 2018-01-11 | End: 2018-01-11 | Stop reason: SURG

## 2018-01-11 RX ORDER — LIDOCAINE HYDROCHLORIDE 20 MG/ML
INJECTION, SOLUTION INFILTRATION; PERINEURAL AS NEEDED
Status: DISCONTINUED | OUTPATIENT
Start: 2018-01-11 | End: 2018-01-11 | Stop reason: SURG

## 2018-01-11 RX ORDER — LABETALOL HYDROCHLORIDE 5 MG/ML
5 INJECTION, SOLUTION INTRAVENOUS
Status: DISCONTINUED | OUTPATIENT
Start: 2018-01-11 | End: 2018-01-11 | Stop reason: HOSPADM

## 2018-01-11 RX ORDER — LEVOFLOXACIN 5 MG/ML
500 INJECTION, SOLUTION INTRAVENOUS ONCE
Status: DISCONTINUED | OUTPATIENT
Start: 2018-01-11 | End: 2018-01-11 | Stop reason: HOSPADM

## 2018-01-11 RX ADMIN — PROPOFOL 50 MG: 10 INJECTION, EMULSION INTRAVENOUS at 07:24

## 2018-01-11 RX ADMIN — PROPOFOL 50 MG: 10 INJECTION, EMULSION INTRAVENOUS at 07:50

## 2018-01-11 RX ADMIN — SODIUM CHLORIDE, SODIUM GLUCONATE, SODIUM ACETATE, POTASSIUM CHLORIDE AND MAGNESIUM CHLORIDE 200 ML: 526; 502; 368; 37; 30 INJECTION, SOLUTION INTRAVENOUS at 07:20

## 2018-01-11 RX ADMIN — SODIUM CHLORIDE, SODIUM GLUCONATE, SODIUM ACETATE, POTASSIUM CHLORIDE AND MAGNESIUM CHLORIDE 150 ML: 526; 502; 368; 37; 30 INJECTION, SOLUTION INTRAVENOUS at 08:43

## 2018-01-11 RX ADMIN — SODIUM CHLORIDE, SODIUM GLUCONATE, SODIUM ACETATE, POTASSIUM CHLORIDE AND MAGNESIUM CHLORIDE 200 ML: 526; 502; 368; 37; 30 INJECTION, SOLUTION INTRAVENOUS at 07:50

## 2018-01-11 RX ADMIN — MIDAZOLAM 1 MG: 1 INJECTION INTRAMUSCULAR; INTRAVENOUS at 07:20

## 2018-01-11 RX ADMIN — PHENYLEPHRINE HYDROCHLORIDE 100 MCG: 10 INJECTION INTRAVENOUS at 07:32

## 2018-01-11 RX ADMIN — PHENYLEPHRINE HYDROCHLORIDE 100 MCG: 10 INJECTION INTRAVENOUS at 07:44

## 2018-01-11 RX ADMIN — FENTANYL CITRATE 50 MCG: 50 INJECTION, SOLUTION INTRAMUSCULAR; INTRAVENOUS at 07:49

## 2018-01-11 RX ADMIN — FENTANYL CITRATE 25 MCG: 50 INJECTION, SOLUTION INTRAMUSCULAR; INTRAVENOUS at 09:01

## 2018-01-11 RX ADMIN — ALBUTEROL SULFATE 2.5 MG: 2.5 SOLUTION RESPIRATORY (INHALATION) at 06:16

## 2018-01-11 RX ADMIN — LEVOFLOXACIN 500 MG: 5 INJECTION, SOLUTION INTRAVENOUS at 07:24

## 2018-01-11 RX ADMIN — SODIUM CHLORIDE, SODIUM GLUCONATE, SODIUM ACETATE, POTASSIUM CHLORIDE AND MAGNESIUM CHLORIDE 150 ML: 526; 502; 368; 37; 30 INJECTION, SOLUTION INTRAVENOUS at 08:55

## 2018-01-11 RX ADMIN — FENTANYL CITRATE 50 MCG: 50 INJECTION, SOLUTION INTRAMUSCULAR; INTRAVENOUS at 08:55

## 2018-01-11 RX ADMIN — PHENYLEPHRINE HYDROCHLORIDE 100 MCG: 10 INJECTION INTRAVENOUS at 07:40

## 2018-01-11 RX ADMIN — SODIUM CHLORIDE, SODIUM GLUCONATE, SODIUM ACETATE, POTASSIUM CHLORIDE AND MAGNESIUM CHLORIDE 1000 ML: 526; 502; 368; 37; 30 INJECTION, SOLUTION INTRAVENOUS at 06:05

## 2018-01-11 RX ADMIN — ROCURONIUM BROMIDE 20 MG: 10 INJECTION INTRAVENOUS at 08:02

## 2018-01-11 RX ADMIN — SODIUM CHLORIDE, SODIUM GLUCONATE, SODIUM ACETATE, POTASSIUM CHLORIDE AND MAGNESIUM CHLORIDE 50 ML: 526; 502; 368; 37; 30 INJECTION, SOLUTION INTRAVENOUS at 07:11

## 2018-01-11 RX ADMIN — SODIUM CHLORIDE, SODIUM GLUCONATE, SODIUM ACETATE, POTASSIUM CHLORIDE AND MAGNESIUM CHLORIDE 200 ML: 526; 502; 368; 37; 30 INJECTION, SOLUTION INTRAVENOUS at 08:05

## 2018-01-11 RX ADMIN — LIDOCAINE HYDROCHLORIDE 50 MG: 20 INJECTION, SOLUTION INFILTRATION; PERINEURAL at 07:21

## 2018-01-11 RX ADMIN — FENTANYL CITRATE 50 MCG: 50 INJECTION, SOLUTION INTRAMUSCULAR; INTRAVENOUS at 07:21

## 2018-01-11 RX ADMIN — TAMSULOSIN HYDROCHLORIDE 0.4 MG: 0.4 CAPSULE ORAL at 09:30

## 2018-01-11 RX ADMIN — PROPOFOL 150 MG: 10 INJECTION, EMULSION INTRAVENOUS at 07:21

## 2018-01-11 RX ADMIN — SODIUM CHLORIDE, SODIUM GLUCONATE, SODIUM ACETATE, POTASSIUM CHLORIDE AND MAGNESIUM CHLORIDE 200 ML: 526; 502; 368; 37; 30 INJECTION, SOLUTION INTRAVENOUS at 08:30

## 2018-01-11 RX ADMIN — FENTANYL CITRATE 25 MCG: 50 INJECTION, SOLUTION INTRAMUSCULAR; INTRAVENOUS at 09:11

## 2018-01-11 RX ADMIN — ONDANSETRON 4 MG: 2 INJECTION INTRAMUSCULAR; INTRAVENOUS at 08:46

## 2018-01-11 RX ADMIN — FENTANYL CITRATE 50 MCG: 50 INJECTION, SOLUTION INTRAMUSCULAR; INTRAVENOUS at 08:16

## 2018-01-11 NOTE — PLAN OF CARE
Problem: Patient Care Overview (Adult)  Goal: Plan of Care Review  Outcome: Ongoing (interventions implemented as appropriate)   01/11/18 0917   Coping/Psychosocial Response Interventions   Plan Of Care Reviewed With patient   Patient Care Overview   Progress improving   Outcome Evaluation   Outcome Summary/Follow up Plan vss, abd incisions noted cdi,pain management continued, tolerating po well, no distress noted     Goal: Adult Individualization and Mutuality  Outcome: Ongoing (interventions implemented as appropriate)      Problem: Perioperative Period (Adult)  Goal: Signs and Symptoms of Listed Potential Problems Will be Absent or Manageable (Perioperative Period)  Outcome: Ongoing (interventions implemented as appropriate)

## 2018-01-11 NOTE — ANESTHESIA PREPROCEDURE EVALUATION
Anesthesia Evaluation     Patient summary reviewed and Nursing notes reviewed   NPO Solid Status: > 8 hours  NPO Liquid Status: > 8 hours     Airway   Mallampati: II  TM distance: >3 FB  Neck ROM: full  possible difficult intubation  Dental    (+) poor dentation    Comment: Upper and lower plates with lower remaining dentition in poor repair. States no loose remaining lower teeth.    Pulmonary - normal exam   (+) a smoker (Quit two months ago. ) Former, COPD moderate, recent URI resolved, decreased breath sounds, wheezes (Expiratory. Albuterol treatment given pre-op.),   Cardiovascular - normal exam    Rhythm: regular  Rate: normal    (+) hyperlipidemia      Neuro/Psych  (+) numbness (Cervical radiculopathy.), psychiatric history Anxiety,     GI/Hepatic/Renal/Endo - negative ROS     Musculoskeletal         ROS comment: Previous back surgery.  Abdominal    Substance History - negative use     OB/GYN negative ob/gyn ROS         Other   (+) arthritis                                             Anesthesia Plan    ASA 3     general     intravenous induction   Anesthetic plan and risks discussed with patient and child.

## 2018-01-11 NOTE — ANESTHESIA POSTPROCEDURE EVALUATION
Patient: Yehuda Rutledge    Procedure Summary     Date Anesthesia Start Anesthesia Stop Room / Location    01/11/18 0710 0912  [facility] / [facility]       Procedure Diagnosis Surgeon Provider    LAPAROSCOPIC INCISIONAL HERNIA REPAIR  (N/A Abdomen) Incisional hernia, without obstruction or gangrene  (Incisional hernia, without obstruction or gangrene [K43.2]) MD Govind Allen MD          Anesthesia Type: general  Last vitals  BP   143/79 (01/11/18 0544)   Temp   97.5 °F (36.4 °C) (01/11/18 0544)   Pulse   86 (01/11/18 0620)   Resp   18 (01/11/18 0544)     SpO2   98 % (01/11/18 0620)     Post Anesthesia Care and Evaluation    Patient location during evaluation: PACU  Patient participation: complete - patient participated  Level of consciousness: awake and alert  Pain score: 0  Pain management: adequate  Airway patency: patent  Anesthetic complications: No anesthetic complications    Cardiovascular status: acceptable and stable  Respiratory status: acceptable, face mask and spontaneous ventilation  Hydration status: acceptable
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
## 2018-01-11 NOTE — PLAN OF CARE
Problem: Patient Care Overview (Adult)  Goal: Plan of Care Review  Outcome: Ongoing (interventions implemented as appropriate)   01/11/18 0606   Coping/Psychosocial Response Interventions   Plan Of Care Reviewed With patient   Patient Care Overview   Progress no change     Goal: Adult Individualization and Mutuality  Outcome: Ongoing (interventions implemented as appropriate)   01/11/18 0606   Mutuality/Individual Preferences   What Information Would Help Us Give You More Personalized Care? Patient likes to be called Yehuda     Goal: Discharge Needs Assessment  Outcome: Ongoing (interventions implemented as appropriate)   01/11/18 0606   Discharge Needs Assessment   Concerns To Be Addressed no discharge needs identified   Living Environment   Transportation Available family or friend will provide       Problem: Perioperative Period (Adult)  Goal: Signs and Symptoms of Listed Potential Problems Will be Absent or Manageable (Perioperative Period)  Outcome: Ongoing (interventions implemented as appropriate)   01/11/18 0606   Perioperative Period   Problems Assessed (Perioperative Period) all   Problems Present (Perioperative Period) none

## 2018-01-11 NOTE — PLAN OF CARE
Problem: Patient Care Overview (Adult)  Goal: Plan of Care Review  Outcome: Outcome(s) achieved Date Met: 01/11/18  Discharge criteria met

## 2018-01-11 NOTE — ANESTHESIA PROCEDURE NOTES
Airway  Urgency: elective    Airway not difficult    General Information and Staff    Patient location during procedure: OR    Indications and Patient Condition  Indications for airway management: airway protection    Preoxygenated: yes  MILS maintained throughout  Mask difficulty assessment: 0 - not attempted    Final Airway Details  Final airway type: endotracheal airway      Successful airway: ETT  Cuffed: yes   Successful intubation technique: direct laryngoscopy  Facilitating devices/methods: intubating stylet and cricoid pressure  Endotracheal tube insertion site: oral  Blade: Sammy  Blade size: #3  ETT size: 8.0 mm  Cormack-Lehane Classification: grade IIa - partial view of glottis  Placement verified by: chest auscultation and capnometry   Measured from: gums  ETT to gums (cm): 20  Number of attempts at approach: 1

## 2018-01-19 ENCOUNTER — OFFICE VISIT (OUTPATIENT)
Dept: SURGERY | Facility: CLINIC | Age: 68
End: 2018-01-19

## 2018-01-19 VITALS
BODY MASS INDEX: 26.78 KG/M2 | HEIGHT: 66 IN | SYSTOLIC BLOOD PRESSURE: 126 MMHG | WEIGHT: 166.6 LBS | DIASTOLIC BLOOD PRESSURE: 66 MMHG

## 2018-01-19 DIAGNOSIS — K43.2 INCISIONAL HERNIA, WITHOUT OBSTRUCTION OR GANGRENE: ICD-10-CM

## 2018-01-19 DIAGNOSIS — K43.2 INCISIONAL HERNIA, WITHOUT OBSTRUCTION OR GANGRENE: Primary | ICD-10-CM

## 2018-01-19 PROCEDURE — 99024 POSTOP FOLLOW-UP VISIT: CPT | Performed by: SURGERY

## 2018-01-19 NOTE — PROGRESS NOTES
67-year-old gentleman now 8 days out from laparoscopic incisional hernia repair.  Patient has done well.  He had some issues with constipation especially with the narcotic but that is resolved as well.  Incisions are all clean and healing nicely.  He has good support his abdominal wall.  Patient will follow up with me in a month or sooner if he has any other concerns or questions

## 2018-02-02 ENCOUNTER — OFFICE VISIT (OUTPATIENT)
Dept: SURGERY | Facility: CLINIC | Age: 68
End: 2018-02-02

## 2018-02-02 VITALS
WEIGHT: 165 LBS | DIASTOLIC BLOOD PRESSURE: 78 MMHG | BODY MASS INDEX: 26.52 KG/M2 | SYSTOLIC BLOOD PRESSURE: 122 MMHG | HEIGHT: 66 IN

## 2018-02-02 DIAGNOSIS — K43.2 INCISIONAL HERNIA, WITHOUT OBSTRUCTION OR GANGRENE: Primary | ICD-10-CM

## 2018-02-02 PROCEDURE — 99024 POSTOP FOLLOW-UP VISIT: CPT | Performed by: SURGERY

## 2018-02-02 NOTE — PROGRESS NOTES
Patient is now over 3 weeks out from his lap incisional hernia repair.  Doing well no complaints.  Has some scar tissue under the skin superficial to the repair and no obvious recurrent hernia.  Has good support his abdominal wall his incisions are all clean and healing nicely.  Patient will follow up with us on a when necessary basis.

## 2018-03-05 ENCOUNTER — OFFICE VISIT (OUTPATIENT)
Dept: FAMILY MEDICINE CLINIC | Facility: CLINIC | Age: 68
End: 2018-03-05

## 2018-03-05 VITALS
HEIGHT: 66 IN | SYSTOLIC BLOOD PRESSURE: 126 MMHG | TEMPERATURE: 97.7 F | HEART RATE: 87 BPM | DIASTOLIC BLOOD PRESSURE: 80 MMHG | WEIGHT: 171.8 LBS | OXYGEN SATURATION: 99 % | BODY MASS INDEX: 27.61 KG/M2

## 2018-03-05 DIAGNOSIS — R73.9 HYPERGLYCEMIA: ICD-10-CM

## 2018-03-05 DIAGNOSIS — E78.5 HYPERLIPIDEMIA, UNSPECIFIED HYPERLIPIDEMIA TYPE: ICD-10-CM

## 2018-03-05 DIAGNOSIS — Z87.19 H/O HERNIA REPAIR: ICD-10-CM

## 2018-03-05 DIAGNOSIS — J44.9 CHRONIC OBSTRUCTIVE PULMONARY DISEASE, UNSPECIFIED COPD TYPE (HCC): ICD-10-CM

## 2018-03-05 DIAGNOSIS — M54.41 CHRONIC RIGHT-SIDED LOW BACK PAIN WITH RIGHT-SIDED SCIATICA: ICD-10-CM

## 2018-03-05 DIAGNOSIS — Z11.59 NEED FOR HEPATITIS C SCREENING TEST: ICD-10-CM

## 2018-03-05 DIAGNOSIS — Z00.00 ROUTINE MEDICAL EXAM: ICD-10-CM

## 2018-03-05 DIAGNOSIS — M54.12 CHRONIC CERVICAL RADICULOPATHY: ICD-10-CM

## 2018-03-05 DIAGNOSIS — R35.89 POLYURIA: ICD-10-CM

## 2018-03-05 DIAGNOSIS — Z13.1 DIABETES MELLITUS SCREENING: Primary | ICD-10-CM

## 2018-03-05 DIAGNOSIS — Z98.890 H/O HERNIA REPAIR: ICD-10-CM

## 2018-03-05 DIAGNOSIS — E66.3 OVERWEIGHT (BMI 25.0-29.9): ICD-10-CM

## 2018-03-05 DIAGNOSIS — G89.29 CHRONIC RIGHT-SIDED LOW BACK PAIN WITH RIGHT-SIDED SCIATICA: ICD-10-CM

## 2018-03-05 PROCEDURE — 99214 OFFICE O/P EST MOD 30 MIN: CPT | Performed by: FAMILY MEDICINE

## 2018-03-05 RX ORDER — GABAPENTIN 100 MG/1
100 CAPSULE ORAL 2 TIMES DAILY
Qty: 60 CAPSULE | Refills: 3 | Status: SHIPPED | OUTPATIENT
Start: 2018-03-05 | End: 2018-07-11 | Stop reason: SDUPTHER

## 2018-03-05 NOTE — PROGRESS NOTES
Subjective   Yehuda Rutledge is a 67 y.o. overweight white male smoker with COPD, Allergies, H/O Lumbar back surgery, Lumbago/Sciatica, H/O ETOH abuse in remission, Abd Hernia (S/P incisional hernia repair), and other health problems see PMH/PSH. Pt presents for exam, to discuss health problem, tx plan and F/U plan.      ' Doing well S/P Hernia repair, incision healing well.  Back pain has been controlled with Neurontin, needs refills. Breathing problems have not flared up lately. B/P has been good on checks. Quit smoking for surgery'.     Wheezing    This is a recurrent problem. The current episode started more than 1 month ago. The problem has been resolved. Associated symptoms include neck pain. Pertinent negatives include no abdominal pain, chills, coughing, diarrhea, fever, headaches, rash, shortness of breath, sore throat or vomiting. He has tried beta agonist inhalers for the symptoms. The treatment provided significant relief. His past medical history is significant for COPD. COPD, Diverticular disease   Hypertension   This is a chronic problem. The current episode started more than 1 year ago. The problem has been waxing and waning since onset. The problem is controlled. Associated symptoms include neck pain. Pertinent negatives include no headaches, palpitations or shortness of breath. Past treatments include lifestyle changes. The current treatment provides significant improvement.   Abdominal Pain   This is a chronic (Incisional hernia R i and supra umbilical) problem. The current episode started more than 1 month ago. The problem has been resolved. The pain is located in the periumbilical region. The pain is at a severity of 0/10. The patient is experiencing no pain. Pertinent negatives include no constipation, diarrhea, fever, headaches, hematuria, myalgias, nausea or vomiting. Nothing aggravates the pain. Treatments tried: S/P incisional hernia repair. The treatment provided no relief. His past medical  history is significant for abdominal surgery. COPD, Diverticular disease        The following portions of the patient's history were reviewed and updated as appropriate: allergies, current medications, past family history, past medical history, past social history, past surgical history and problem list.    Review of Systems   Constitutional: Negative for chills, fatigue and fever.   HENT: Negative for congestion, facial swelling, mouth sores, postnasal drip, sinus pressure and sore throat.    Eyes: Negative for pain, discharge and visual disturbance.   Respiratory: Negative for cough, chest tightness, shortness of breath and wheezing.    Cardiovascular: Negative for palpitations and leg swelling.   Gastrointestinal: Negative for abdominal pain, constipation, diarrhea, nausea and vomiting.        Abd pain resolved after hernia repair.   Endocrine: Negative.  Negative for cold intolerance and heat intolerance.   Genitourinary: Negative.  Negative for difficulty urinating, hematuria and urgency.   Musculoskeletal: Positive for back pain and neck pain. Negative for myalgias.        Neck and Back pain controlled with Neurontin   Skin: Negative.  Negative for color change, pallor, rash and wound.        Surgical incisions healing well.    Allergic/Immunologic: Positive for environmental allergies. Negative for food allergies and immunocompromised state.   Neurological: Negative for tremors, speech difficulty and headaches.   Hematological: Negative for adenopathy. Does not bruise/bleed easily.   Psychiatric/Behavioral: Negative for dysphoric mood. The patient is nervous/anxious. The patient is not hyperactive.        Objective   Physical Exam   Constitutional: He is oriented to person, place, and time. He appears well-developed and well-nourished. No distress.   HENT:   Head: Normocephalic and atraumatic.   Right Ear: External ear normal.   Left Ear: External ear normal.   Nose: Nose normal.   Mouth/Throat: Oropharynx is  clear and moist. No oropharyngeal exudate.        Eyes: Conjunctivae and EOM are normal. Pupils are equal, round, and reactive to light. Right eye exhibits no discharge. Left eye exhibits no discharge. No scleral icterus.   Neck: Neck supple. No JVD present. No tracheal deviation present. No thyromegaly present.   Has Full cervical ROM, WB 2.   Cardiovascular: Normal rate, regular rhythm, normal heart sounds and intact distal pulses.  Exam reveals no gallop and no friction rub.    No murmur heard.  Pulmonary/Chest: Effort normal. No stridor. No respiratory distress. He has no wheezes. He has no rales. He exhibits no tenderness.   Abdominal: Soft. Bowel sounds are normal. He exhibits no distension and no mass. There is no tenderness. There is no rebound and no guarding. No hernia.   Hernia resolved S/P surgical repair.   Musculoskeletal: He exhibits no edema, tenderness or deformity.   Has WB 2- with ROM back , worse with extension, than flexion or rotation.   Lymphadenopathy:     He has no cervical adenopathy.   Neurological: He is alert and oriented to person, place, and time. He has normal reflexes. He displays normal reflexes. No cranial nerve deficit. He exhibits normal muscle tone. Coordination normal.   Skin: Skin is warm and dry. No rash noted. He is not diaphoretic. No erythema. No pallor.   Lap surgical incisions healing well.   Psychiatric: He has a normal mood and affect. His behavior is normal. Judgment and thought content normal.   Nursing note and vitals reviewed.      Assessment/Plan   Yehuda was seen today for hypertension, back pain and neck pain.    Diagnoses and all orders for this visit:    Diabetes mellitus screening  -     CBC & Differential; Future  -     Comprehensive metabolic panel; Future  -     Urinalysis - Urine, Clean Catch; Future  -     TSH; Future  -     Hemoglobin A1c; Future    Routine medical exam  -     CBC & Differential; Future  -     Comprehensive metabolic panel; Future  -      Urinalysis - Urine, Clean Catch; Future  -     TSH; Future  -     Hemoglobin A1c; Future    Overweight (BMI 25.0-29.9)  -     CBC & Differential; Future  -     Comprehensive metabolic panel; Future  -     Urinalysis - Urine, Clean Catch; Future  -     TSH; Future  -     Hemoglobin A1c; Future    Hyperlipidemia, unspecified hyperlipidemia type  -     CBC & Differential; Future  -     Comprehensive metabolic panel; Future  -     Urinalysis - Urine, Clean Catch; Future  -     TSH; Future  -     Hemoglobin A1c; Future    Hyperglycemia  -     CBC & Differential; Future  -     Comprehensive metabolic panel; Future  -     Urinalysis - Urine, Clean Catch; Future  -     TSH; Future  -     Hemoglobin A1c; Future    Polyuria  -     CBC & Differential; Future  -     Comprehensive metabolic panel; Future  -     Urinalysis - Urine, Clean Catch; Future  -     TSH; Future  -     Hemoglobin A1c; Future    Need for hepatitis C screening test  -     Hepatitis C antibody; Future    Chronic obstructive pulmonary disease, unspecified COPD type    Chronic right-sided low back pain with right-sided sciatica    Chronic cervical radiculopathy    H/O hernia repair  Comments:  Incisional hernia repair.    Other orders  -     gabapentin (NEURONTIN) 100 MG capsule; Take 1 capsule by mouth 2 (Two) Times a Day.    Discussed current health problem list, meds, indications, Tx plan. RONALDO reviewed, Discussed safety with controlled meds. Discussed continued smoking cessation. Discussed USPSTF recommendations. Discussed F/U plan.          This document has been electronically signed by Buzz Sifuentes MD

## 2018-03-06 ENCOUNTER — LAB (OUTPATIENT)
Dept: LAB | Facility: CLINIC | Age: 68
End: 2018-03-06

## 2018-03-06 DIAGNOSIS — Z00.00 ROUTINE MEDICAL EXAM: ICD-10-CM

## 2018-03-06 DIAGNOSIS — Z13.1 DIABETES MELLITUS SCREENING: ICD-10-CM

## 2018-03-06 DIAGNOSIS — R73.9 HYPERGLYCEMIA: ICD-10-CM

## 2018-03-06 DIAGNOSIS — R35.89 POLYURIA: ICD-10-CM

## 2018-03-06 DIAGNOSIS — E66.3 OVERWEIGHT (BMI 25.0-29.9): ICD-10-CM

## 2018-03-06 DIAGNOSIS — E78.5 HYPERLIPIDEMIA, UNSPECIFIED HYPERLIPIDEMIA TYPE: ICD-10-CM

## 2018-03-06 DIAGNOSIS — Z11.59 NEED FOR HEPATITIS C SCREENING TEST: ICD-10-CM

## 2018-03-06 LAB
ALBUMIN SERPL-MCNC: 4.5 G/DL (ref 3.4–4.8)
ALBUMIN/GLOB SERPL: 1.4 G/DL (ref 1.1–1.8)
ALP SERPL-CCNC: 90 U/L (ref 38–126)
ALT SERPL W P-5'-P-CCNC: 48 U/L (ref 21–72)
ANION GAP SERPL CALCULATED.3IONS-SCNC: 17 MMOL/L (ref 5–15)
AST SERPL-CCNC: 26 U/L (ref 17–59)
BASOPHILS # BLD AUTO: 0.03 10*3/MM3 (ref 0–0.2)
BASOPHILS NFR BLD AUTO: 0.3 % (ref 0–2)
BILIRUB SERPL-MCNC: 0.3 MG/DL (ref 0.2–1.3)
BILIRUB UR QL STRIP: NEGATIVE
BUN BLD-MCNC: 14 MG/DL (ref 7–21)
BUN/CREAT SERPL: 14.9 (ref 7–25)
CALCIUM SPEC-SCNC: 9.9 MG/DL (ref 8.4–10.2)
CHLORIDE SERPL-SCNC: 100 MMOL/L (ref 95–110)
CLARITY UR: CLEAR
CO2 SERPL-SCNC: 23 MMOL/L (ref 22–31)
COLOR UR: YELLOW
CREAT BLD-MCNC: 0.94 MG/DL (ref 0.7–1.3)
DEPRECATED RDW RBC AUTO: 44.2 FL (ref 35.1–43.9)
EOSINOPHIL # BLD AUTO: 0.14 10*3/MM3 (ref 0–0.7)
EOSINOPHIL NFR BLD AUTO: 1.5 % (ref 0–7)
ERYTHROCYTE [DISTWIDTH] IN BLOOD BY AUTOMATED COUNT: 14.1 % (ref 11.5–14.5)
GFR SERPL CREATININE-BSD FRML MDRD: 81 ML/MIN/1.73 (ref 49–113)
GLOBULIN UR ELPH-MCNC: 3.3 GM/DL (ref 2.3–3.5)
GLUCOSE BLD-MCNC: 95 MG/DL (ref 60–100)
GLUCOSE UR STRIP-MCNC: NEGATIVE MG/DL
HBA1C MFR BLD: 5.9 % (ref 4–5.6)
HCT VFR BLD AUTO: 42.3 % (ref 39–49)
HGB BLD-MCNC: 14 G/DL (ref 13.7–17.3)
HGB UR QL STRIP.AUTO: ABNORMAL
IMM GRANULOCYTES # BLD: 0.04 10*3/MM3 (ref 0–0.02)
IMM GRANULOCYTES NFR BLD: 0.4 % (ref 0–0.5)
KETONES UR QL STRIP: ABNORMAL
LEUKOCYTE ESTERASE UR QL STRIP.AUTO: NEGATIVE
LYMPHOCYTES # BLD AUTO: 4.44 10*3/MM3 (ref 0.6–4.2)
LYMPHOCYTES NFR BLD AUTO: 46.2 % (ref 10–50)
MCH RBC QN AUTO: 28.6 PG (ref 26.5–34)
MCHC RBC AUTO-ENTMCNC: 33.1 G/DL (ref 31.5–36.3)
MCV RBC AUTO: 86.5 FL (ref 80–98)
MONOCYTES # BLD AUTO: 0.74 10*3/MM3 (ref 0–0.9)
MONOCYTES NFR BLD AUTO: 7.7 % (ref 0–12)
NEUTROPHILS # BLD AUTO: 4.23 10*3/MM3 (ref 2–8.6)
NEUTROPHILS NFR BLD AUTO: 43.9 % (ref 37–80)
NITRITE UR QL STRIP: NEGATIVE
PH UR STRIP.AUTO: 6.5 [PH] (ref 5–8)
PLATELET # BLD AUTO: 288 10*3/MM3 (ref 150–450)
PMV BLD AUTO: 10.7 FL (ref 8–12)
POTASSIUM BLD-SCNC: 4.6 MMOL/L (ref 3.5–5.1)
PROT SERPL-MCNC: 7.8 G/DL (ref 6.3–8.6)
PROT UR QL STRIP: ABNORMAL
RBC # BLD AUTO: 4.89 10*6/MM3 (ref 4.37–5.74)
SODIUM BLD-SCNC: 140 MMOL/L (ref 137–145)
SP GR UR STRIP: 1.02 (ref 1–1.03)
TSH SERPL DL<=0.05 MIU/L-ACNC: 3.08 MIU/ML (ref 0.46–4.68)
UROBILINOGEN UR QL STRIP: ABNORMAL
WBC NRBC COR # BLD: 9.62 10*3/MM3 (ref 3.2–9.8)

## 2018-03-06 PROCEDURE — 83036 HEMOGLOBIN GLYCOSYLATED A1C: CPT | Performed by: FAMILY MEDICINE

## 2018-03-06 PROCEDURE — 36415 COLL VENOUS BLD VENIPUNCTURE: CPT | Performed by: FAMILY MEDICINE

## 2018-03-06 PROCEDURE — 80053 COMPREHEN METABOLIC PANEL: CPT | Performed by: FAMILY MEDICINE

## 2018-03-06 PROCEDURE — 86803 HEPATITIS C AB TEST: CPT | Performed by: FAMILY MEDICINE

## 2018-03-06 PROCEDURE — 81003 URINALYSIS AUTO W/O SCOPE: CPT | Performed by: FAMILY MEDICINE

## 2018-03-06 PROCEDURE — 84443 ASSAY THYROID STIM HORMONE: CPT | Performed by: FAMILY MEDICINE

## 2018-03-06 PROCEDURE — 85025 COMPLETE CBC W/AUTO DIFF WBC: CPT | Performed by: FAMILY MEDICINE

## 2018-03-07 ENCOUNTER — TELEPHONE (OUTPATIENT)
Dept: FAMILY MEDICINE CLINIC | Facility: CLINIC | Age: 68
End: 2018-03-07

## 2018-03-07 LAB — HCV AB SER DONR QL: NEGATIVE

## 2018-03-07 NOTE — TELEPHONE ENCOUNTER
----- Message from Buzz Sifuentes MD sent at 3/7/2018 10:18 AM CST -----  Pre-diabetic range lab, watch, carbs and any high sugar intake.  Other lab OK, will go over at F/U visit.

## 2018-03-07 NOTE — TELEPHONE ENCOUNTER
Spoke with pt regarding lab results.  Advised to watch diet, carbs, sugars.  Pt voiced understanding.

## 2018-03-21 ENCOUNTER — OFFICE VISIT (OUTPATIENT)
Dept: FAMILY MEDICINE CLINIC | Facility: CLINIC | Age: 68
End: 2018-03-21

## 2018-03-21 VITALS
OXYGEN SATURATION: 98 % | SYSTOLIC BLOOD PRESSURE: 130 MMHG | DIASTOLIC BLOOD PRESSURE: 78 MMHG | HEIGHT: 66 IN | HEART RATE: 85 BPM | TEMPERATURE: 97.5 F | BODY MASS INDEX: 27.13 KG/M2 | WEIGHT: 168.8 LBS

## 2018-03-21 DIAGNOSIS — Z00.00 WELCOME TO MEDICARE PREVENTIVE VISIT: Primary | ICD-10-CM

## 2018-03-21 PROBLEM — Z87.891 FORMER SMOKER: Status: ACTIVE | Noted: 2018-03-21

## 2018-03-21 PROBLEM — J40 BRONCHITIS: Status: RESOLVED | Noted: 2017-04-12 | Resolved: 2018-03-21

## 2018-03-21 PROCEDURE — G0402 INITIAL PREVENTIVE EXAM: HCPCS | Performed by: FAMILY MEDICINE

## 2018-03-21 NOTE — PROGRESS NOTES
QUICK REFERENCE INFORMATION:  The ABCs of the Annual Wellness Visit    [facility]    HEALTH RISK ASSESSMENT    1950    Recent Hospitalizations:  No hospitalization(s) within the last year..      Current Medical Providers:  Patient Care Team:  Buzz Sifuentes MD as PCP - General  Buzz Sifuentes MD as PCP - Claims Attributed      Smoking Status:  History   Smoking Status   • Former Smoker   • Packs/day: 1.00   • Years: 40.00   • Types: Cigarettes   • Quit date: 11/8/2017   Smokeless Tobacco   • Never Used       Alcohol Consumption:  History   Alcohol Use No       Depression Screen:   PHQ-2/PHQ-9 Depression Screening 3/21/2018   Little interest or pleasure in doing things 0   Feeling down, depressed, or hopeless 0   Total Score 0       Health Habits and Functional and Cognitive Screening:  Functional & Cognitive Status 3/21/2018   Do you have difficulty preparing food and eating? No   Do you have difficulty bathing yourself, getting dressed or grooming yourself? No   Do you have difficulty using the toilet? No   Do you have difficulty moving around from place to place? No   Do you have trouble with steps or getting out of a bed or a chair? No   In the past year have you fallen or experienced a near fall? No   Current Diet Well Balanced Diet   Dental Exam Up to date   Eye Exam Up to date   Exercise (times per week) 7 times per week   Current Exercise Activities Include Walking   Do you need help using the phone?  No   Are you deaf or do you have serious difficulty hearing?  No   Do you need help with transportation? No   Do you need help shopping? No   Do you need help preparing meals?  No   Do you need help with housework?  No   Do you need help with laundry? No   Do you need help taking your medications? No   Do you need help managing money? No   Do you ever drive or ride in a car without wearing a seat belt? No           Does the patient have evidence of cognitive impairment?  Yes    Aspirin use counseling? Taking ASA appropriately as indicated      Recent Lab Results:  CMP:  Lab Results   Component Value Date    BUN 14 03/06/2018    CREATININE 0.94 03/06/2018    EGFRIFNONA 81 03/06/2018    BCR 14.9 03/06/2018     03/06/2018    K 4.6 03/06/2018    CO2 23.0 03/06/2018    CALCIUM 9.9 03/06/2018    ALBUMIN 4.50 03/06/2018    LABIL2 1.4 03/06/2018    BILITOT 0.3 03/06/2018    ALKPHOS 90 03/06/2018    AST 26 03/06/2018    ALT 48 03/06/2018     Lipid Panel:  Lab Results   Component Value Date    CHOL 194 02/10/2017    TRIG 356 (H) 02/10/2017    HDL 34 (L) 02/10/2017    LDLHDL 2.61 02/10/2017     HbA1c:  Lab Results   Component Value Date    HGBA1C 5.9 (H) 03/06/2018       Visual Acuity:  Vision Screening Comments: Up to date with Dr Santamaria    Age-appropriate Screening Schedule:  Refer to the list below for future screening recommendations based on patient's age, sex and/or medical conditions. Orders for these recommended tests are listed in the plan section. The patient has been provided with a written plan.    Health Maintenance   Topic Date Due   • PNEUMOCOCCAL VACCINES (65+ LOW/MEDIUM RISK) (1 of 2 - PCV13) 08/19/2017   • LIPID PANEL  02/10/2018   • TDAP/TD VACCINES (1 - Tdap) 07/31/2025 (Originally 8/1/2015)   • COLONOSCOPY  12/18/2025   • INFLUENZA VACCINE  Addressed   • ZOSTER VACCINE  Addressed        Subjective   History of Present Illness    Yehuda Rutledge is a 67 y.o. male an established patient presenting for a Welcome to Medicare Visit.     The following portions of the patient's history were reviewed and updated as appropriate: allergies, current medications, past family history, past medical history, past social history, past surgical history and problem list.    Outpatient Medications Prior to Visit   Medication Sig Dispense Refill   • albuterol (PROVENTIL HFA;VENTOLIN HFA) 108 (90 BASE) MCG/ACT inhaler Inhale 2 puffs Every 4 (Four) Hours As Needed. 2 puffs by  Mouth  every 4-6 hours        • aspirin 81 MG EC tablet Take 81 mg by mouth daily.     • brompheniramine-pseudoephedrine-DM 30-2-10 MG/5ML syrup Take 5 mL by mouth 4 (Four) Times a Day As Needed for Allergies. 473 mL 1   • cyclobenzaprine (FLEXERIL) 10 MG tablet Take 10 mg by mouth 2 (two) times a day as needed for muscle spasms.     • Diphenhydramine-Phenylephrine (QC ALLERGY/SINUS-D PO) Take 1 tablet by mouth Daily.     • fluticasone (FLONASE) 50 MCG/ACT nasal spray 2 sprays into each nostril Daily As Needed. Administer 2 sprays in each nostril for each dose.      • gabapentin (NEURONTIN) 100 MG capsule Take 1 capsule by mouth 2 (Two) Times a Day. 60 capsule 3   • Misc Natural Products (OSTEO BI-FLEX JOINT SHIELD PO) Take 1 tablet by mouth Daily.     • Multiple Vitamins-Minerals (MULTIVITAMIN ADULT PO) Take 1 tablet by mouth Daily.     • Omega-3 Fatty Acids (FISH OIL) 1000 MG capsule capsule Take 1,000 mg by mouth Daily With Breakfast.     • pravastatin (PRAVACHOL) 20 MG tablet Take 1 tablet by mouth Every Night. 30 tablet 5     No facility-administered medications prior to visit.        Patient Active Problem List   Diagnosis   • Simple chronic bronchitis   • Environmental allergies   • Right-sided low back pain with right-sided sciatica   • Overweight (BMI 25.0-29.9)   • Chronic obstructive lung disease   • Adenopathy, cervical   • High risk medication use   • Umbilical hernia without obstruction and without gangrene   • Chronic cervical radiculopathy   • Incisional hernia, without obstruction or gangrene   • Diabetes mellitus screening   • Hyperlipidemia   • Hyperglycemia   • Polyuria   • H/O umbilical hernia repair   • Former smoker       Advance Care Planning:  has NO advance directive - information provided to the patient today    Identification of Risk Factors:  Risk factors include: weight  and cardiovascular risk.    Review of Systems    Compared to one year ago, the patient feels his physical health is  "better.  Compared to one year ago, the patient feels his mental health is better.    Objective    Physical Exam    Vitals:    03/21/18 0841   BP: 130/78   BP Location: Right arm   Patient Position: Sitting   Cuff Size: Adult   Pulse: 85   Temp: 97.5 °F (36.4 °C)   TempSrc: Oral   SpO2: 98%   Weight: 76.6 kg (168 lb 12.8 oz)   Height: 167.6 cm (66\")   PainSc: 0-No pain       Body mass index is 27.25 kg/m².  Discussed the patient's BMI with him. BMI is above normal parameters. Follow-up plan includes:  educational material, exercise counseling and nutrition counseling.    Procedure   Procedures       Assessment/Plan   Patient Self-Management and Personalized Health Advice  The patient has been provided with information about: diet, exercise, prevention of cardiac or vascular disease and fall prevention and preventive services including:   · Advance directive, Fall Risk assessment done, Fall Risk plan of care done, Pneumococcal vaccine , Zostavax vaccine (Herpes Zoster).    Visit Diagnoses:    ICD-10-CM ICD-9-CM   1. Welcome to Medicare preventive visit Z00.00 V70.0       No orders of the defined types were placed in this encounter.      Outpatient Encounter Prescriptions as of 3/21/2018   Medication Sig Dispense Refill   • albuterol (PROVENTIL HFA;VENTOLIN HFA) 108 (90 BASE) MCG/ACT inhaler Inhale 2 puffs Every 4 (Four) Hours As Needed. 2 puffs by  Mouth every 4-6 hours        • aspirin 81 MG EC tablet Take 81 mg by mouth daily.     • brompheniramine-pseudoephedrine-DM 30-2-10 MG/5ML syrup Take 5 mL by mouth 4 (Four) Times a Day As Needed for Allergies. 473 mL 1   • cyclobenzaprine (FLEXERIL) 10 MG tablet Take 10 mg by mouth 2 (two) times a day as needed for muscle spasms.     • Diphenhydramine-Phenylephrine (QC ALLERGY/SINUS-D PO) Take 1 tablet by mouth Daily.     • fluticasone (FLONASE) 50 MCG/ACT nasal spray 2 sprays into each nostril Daily As Needed. Administer 2 sprays in each nostril for each dose.      • " gabapentin (NEURONTIN) 100 MG capsule Take 1 capsule by mouth 2 (Two) Times a Day. 60 capsule 3   • Misc Natural Products (OSTEO BI-FLEX JOINT SHIELD PO) Take 1 tablet by mouth Daily.     • Multiple Vitamins-Minerals (MULTIVITAMIN ADULT PO) Take 1 tablet by mouth Daily.     • Omega-3 Fatty Acids (FISH OIL) 1000 MG capsule capsule Take 1,000 mg by mouth Daily With Breakfast.     • pravastatin (PRAVACHOL) 20 MG tablet Take 1 tablet by mouth Every Night. 30 tablet 5     No facility-administered encounter medications on file as of 3/21/2018.        Reviewed use of high risk medication in the elderly: no  Reviewed for potential of harmful drug interactions in the elderly: no    Follow Up:  Return in about 1 year (around 3/21/2019) for Medicare Wellness subsequent.     An After Visit Summary and PPPS with all of these plans were given to the patient.       Pt has developed a Action plan for healthier lifestyle in next year, see scanned documents.           This document has been electronically signed by Buzz Sifuentes MD on March 21, 2018 9:19 AM

## 2018-03-21 NOTE — PATIENT INSTRUCTIONS
Preventive Care 65 Years and Older, Male  Preventive care refers to lifestyle choices and visits with your health care provider that can promote health and wellness.  What does preventive care include?  · A yearly physical exam. This is also called an annual well check.  · Dental exams once or twice a year.  · Routine eye exams. Ask your health care provider how often you should have your eyes checked.  · Personal lifestyle choices, including:  ¨ Daily care of your teeth and gums.  ¨ Regular physical activity.  ¨ Eating a healthy diet.  ¨ Avoiding tobacco and drug use.  ¨ Limiting alcohol use.  ¨ Practicing safe sex.  ¨ Taking low doses of aspirin every day.  ¨ Taking vitamin and mineral supplements as recommended by your health care provider.  What happens during an annual well check?  The services and screenings done by your health care provider during your annual well check will depend on your age, overall health, lifestyle risk factors, and family history of disease.  Counseling   Your health care provider may ask you questions about your:  · Alcohol use.  · Tobacco use.  · Drug use.  · Emotional well-being.  · Home and relationship well-being.  · Sexual activity.  · Eating habits.  · History of falls.  · Memory and ability to understand (cognition).  · Work and work environment.  Screening   You may have the following tests or measurements:  · Height, weight, and BMI.  · Blood pressure.  · Lipid and cholesterol levels. These may be checked every 5 years, or more frequently if you are over 50 years old.  · Skin check.  · Lung cancer screening. You may have this screening every year starting at age 55 if you have a 30-pack-year history of smoking and currently smoke or have quit within the past 15 years.  · Fecal occult blood test (FOBT) of the stool. You may have this test every year starting at age 50.  · Flexible sigmoidoscopy or colonoscopy. You may have a sigmoidoscopy every 5 years or a colonoscopy every 10  years starting at age 50.  · Prostate cancer screening. Recommendations will vary depending on your family history and other risks.  · Hepatitis C blood test.  · Hepatitis B blood test.  · Sexually transmitted disease (STD) testing.  · Diabetes screening. This is done by checking your blood sugar (glucose) after you have not eaten for a while (fasting). You may have this done every 1-3 years.  · Abdominal aortic aneurysm (AAA) screening. You may need this if you are a current or former smoker.  · Osteoporosis. You may be screened starting at age 70 if you are at high risk.  Talk with your health care provider about your test results, treatment options, and if necessary, the need for more tests.  Vaccines   Your health care provider may recommend certain vaccines, such as:  · Influenza vaccine. This is recommended every year.  · Tetanus, diphtheria, and acellular pertussis (Tdap, Td) vaccine. You may need a Td booster every 10 years.  · Varicella vaccine. You may need this if you have not been vaccinated.  · Zoster vaccine. You may need this after age 60.  · Measles, mumps, and rubella (MMR) vaccine. You may need at least one dose of MMR if you were born in 1957 or later. You may also need a second dose.  · Pneumococcal 13-valent conjugate (PCV13) vaccine. One dose is recommended after age 65.  · Pneumococcal polysaccharide (PPSV23) vaccine. One dose is recommended after age 65.  · Meningococcal vaccine. You may need this if you have certain conditions.  · Hepatitis A vaccine. You may need this if you have certain conditions or if you travel or work in places where you may be exposed to hepatitis A.  · Hepatitis B vaccine. You may need this if you have certain conditions or if you travel or work in places where you may be exposed to hepatitis B.  · Haemophilus influenzae type b (Hib) vaccine. You may need this if you have certain risk factors.  Talk to your health care provider about which screenings and vaccines you  need and how often you need them.  This information is not intended to replace advice given to you by your health care provider. Make sure you discuss any questions you have with your health care provider.  Document Released: 01/13/2017 Document Revised: 09/06/2017 Document Reviewed: 10/18/2016  [employer] Interactive Patient Education © 2017 [employer]  Fall Prevention in the Home  Falls can cause injuries and can affect people from all age groups. There are many simple things that you can do to make your home safe and to help prevent falls.  What can I do on the outside of my home?  · Regularly repair the edges of walkways and driveways and fix any cracks.  · Remove high doorway thresholds.  · Trim any shrubbery on the main path into your home.  · Use bright outdoor lighting.  · Clear walkways of debris and clutter, including tools and rocks.  · Regularly check that handrails are securely fastened and in good repair. Both sides of any steps should have handrails.  · Install guardrails along the edges of any raised decks or porches.  · Have leaves, snow, and ice cleared regularly.  · Use sand or salt on walkways during winter months.  · In the garage, clean up any spills right away, including grease or oil spills.  What can I do in the bathroom?  · Use night lights.  · Install grab bars by the toilet and in the tub and shower. Do not use towel bars as grab bars.  · Use non-skid mats or decals on the floor of the tub or shower.  · If you need to sit down while you are in the shower, use a plastic, non-slip stool.  · Keep the floor dry. Immediately clean up any water that spills on the floor.  · Remove soap buildup in the tub or shower on a regular basis.  · Attach bath mats securely with double-sided non-slip rug tape.  · Remove throw rugs and other tripping hazards from the floor.  What can I do in the bedroom?  · Use night lights.  · Make sure that a bedside light is easy to reach.  · Do not use oversized bedding  that drapes onto the floor.  · Have a firm chair that has side arms to use for getting dressed.  · Remove throw rugs and other tripping hazards from the floor.  What can I do in the kitchen?  · Clean up any spills right away.  · Avoid walking on wet floors.  · Place frequently used items in easy-to-reach places.  · If you need to reach for something above you, use a sturdy step stool that has a grab bar.  · Keep electrical cables out of the way.  · Do not use floor polish or wax that makes floors slippery. If you have to use wax, make sure that it is non-skid floor wax.  · Remove throw rugs and other tripping hazards from the floor.  What can I do in the stairways?  · Do not leave any items on the stairs.  · Make sure that there are handrails on both sides of the stairs. Fix handrails that are broken or loose. Make sure that handrails are as long as the stairways.  · Check any carpeting to make sure that it is firmly attached to the stairs. Fix any carpet that is loose or worn.  · Avoid having throw rugs at the top or bottom of stairways, or secure the rugs with carpet tape to prevent them from moving.  · Make sure that you have a light switch at the top of the stairs and the bottom of the stairs. If you do not have them, have them installed.  What are some other fall prevention tips?  · Wear closed-toe shoes that fit well and support your feet. Wear shoes that have rubber soles or low heels.  · When you use a stepladder, make sure that it is completely opened and that the sides are firmly locked. Have someone hold the ladder while you are using it. Do not climb a closed stepladder.  · Add color or contrast paint or tape to grab bars and handrails in your home. Place contrasting color strips on the first and last steps.  · Use mobility aids as needed, such as canes, walkers, scooters, and crutches.  · Turn on lights if it is dark. Replace any light bulbs that burn out.  · Set up furniture so that there are clear  paths. Keep the furniture in the same spot.  · Fix any uneven floor surfaces.  · Choose a carpet design that does not hide the edge of steps of a stairway.  · Be aware of any and all pets.  · Review your medicines with your healthcare provider. Some medicines can cause dizziness or changes in blood pressure, which increase your risk of falling.  Talk with your health care provider about other ways that you can decrease your risk of falls. This may include working with a physical therapist or  to improve your strength, balance, and endurance.  This information is not intended to replace advice given to you by your health care provider. Make sure you discuss any questions you have with your health care provider.  Document Released: 12/08/2003 Document Revised: 05/16/2017 Document Reviewed: 01/22/2016  [employer] Interactive Patient Education © 2017 [employer]  Exercising to Stay Healthy  Exercising regularly is important. It has many health benefits, such as:  · Improving your overall fitness, flexibility, and endurance.  · Increasing your bone density.  · Helping with weight control.  · Decreasing your body fat.  · Increasing your muscle strength.  · Reducing stress and tension.  · Improving your overall health.  In order to become healthy and stay healthy, it is recommended that you do moderate-intensity and vigorous-intensity exercise. You can tell that you are exercising at a moderate intensity if you have a higher heart rate and faster breathing, but you are still able to hold a conversation. You can tell that you are exercising at a vigorous intensity if you are breathing much harder and faster and cannot hold a conversation while exercising.  How often should I exercise?  Choose an activity that you enjoy and set realistic goals. Your health care provider can help you to make an activity plan that works for you. Exercise regularly as directed by your health care provider. This may include:  · Doing  resistance training twice each week, such as:  ¨ Push-ups.  ¨ Sit-ups.  ¨ Lifting weights.  ¨ Using resistance bands.  · Doing a given intensity of exercise for a given amount of time. Choose from these options:  ¨ 150 minutes of moderate-intensity exercise every week.  ¨ 75 minutes of vigorous-intensity exercise every week.  ¨ A mix of moderate-intensity and vigorous-intensity exercise every week.  Children, pregnant women, people who are out of shape, people who are overweight, and older adults may need to consult a health care provider for individual recommendations. If you have any sort of medical condition, be sure to consult your health care provider before starting a new exercise program.  What are some exercise ideas?  Some moderate-intensity exercise ideas include:  · Walking at a rate of 1 mile in 15 minutes.  · Biking.  · Hiking.  · Golfing.  · Dancing.  Some vigorous-intensity exercise ideas include:  · Walking at a rate of at least 4.5 miles per hour.  · Jogging or running at a rate of 5 miles per hour.  · Biking at a rate of at least 10 miles per hour.  · Lap swimming.  · Roller-skating or in-line skating.  · Cross-country skiing.  · Vigorous competitive sports, such as football, basketball, and soccer.  · Jumping rope.  · Aerobic dancing.  What are some everyday activities that can help me to get exercise?  · Yard work, such as:  ¨ Pushing a .  ¨ Raking and bagging leaves.  · Washing and waxing your car.  · Pushing a stroller.  · Shoveling snow.  · Gardening.  · Washing windows or floors.  How can I be more active in my day-to-day activities?  · Use the stairs instead of the elevator.  · Take a walk during your lunch break.  · If you drive, park your car farther away from work or school.  · If you take public transportation, get off one stop early and walk the rest of the way.  · Make all of your phone calls while standing up and walking around.  · Get up, stretch, and walk around every 30  minutes throughout the day.  What guidelines should I follow while exercising?  · Do not exercise so much that you hurt yourself, feel dizzy, or get very short of breath.  · Consult your health care provider before starting a new exercise program.  · Wear comfortable clothes and shoes with good support.  · Drink plenty of water while you exercise to prevent dehydration or heat stroke. Body water is lost during exercise and must be replaced.  · Work out until you breathe faster and your heart beats faster.  This information is not intended to replace advice given to you by your health care provider. Make sure you discuss any questions you have with your health care provider.  Document Released: 2012 Document Revised: 2017 Document Reviewed: 2015  [employer] Interactive Patient Education © 2017 [employer]    Medicare Wellness  Personal Prevention Plan of Service     Date of Office Visit:  2018  Encounter Provider:  Buzz Sifuentes MD  Place of Service:  [facility]  Patient Name: Yehuda Rutledge  :  1950    As part of the Medicare Wellness portion of your visit today, we are providing you with this personalized preventive plan of services (PPPS). This plan is based upon recommendations of the [place] Preventive Services Task Force (USPSTF) and the Advisory Committee on Immunization Practices (ACIP).    This lists the preventive care services that should be considered, and provides dates of when you are due. Items listed as completed are up-to-date and do not require any further intervention.    Health Maintenance   Topic Date Due   • LUNG CANCER SCREENING  2007   • TDAP/TD VACCINES (1 - Tdap) 2015   • COLONOSCOPY  2016   • ZOSTER VACCINE  2016   • PNEUMOCOCCAL VACCINES (65+ LOW/MEDIUM RISK) (1 of 2 - PCV13) 2017   • LIPID PANEL  02/10/2018   • MEDICARE ANNUAL WELLNESS  2019   • HEPATITIS C  SCREENING  Completed   • INFLUENZA VACCINE  Addressed   • AAA SCREEN (ONE-TIME)  Completed       No orders of the defined types were placed in this encounter.      Return in about 1 year (around 3/21/2019) for Medicare Wellness subsequent.

## 2018-04-30 ENCOUNTER — TELEPHONE (OUTPATIENT)
Dept: FAMILY MEDICINE CLINIC | Facility: CLINIC | Age: 68
End: 2018-04-30

## 2018-04-30 RX ORDER — LEVOFLOXACIN 750 MG/1
750 TABLET ORAL DAILY
Qty: 5 TABLET | Refills: 0 | Status: SHIPPED | OUTPATIENT
Start: 2018-04-30 | End: 2018-05-23

## 2018-04-30 NOTE — TELEPHONE ENCOUNTER
Pt has been having some trouble with sinuses.  Has some thick drainage down his throat.  Some cough, sneezing.  Would like to know if you would send something in for him please.

## 2018-05-23 ENCOUNTER — OFFICE VISIT (OUTPATIENT)
Dept: FAMILY MEDICINE CLINIC | Facility: CLINIC | Age: 68
End: 2018-05-23

## 2018-05-23 VITALS
BODY MASS INDEX: 26.82 KG/M2 | DIASTOLIC BLOOD PRESSURE: 70 MMHG | HEART RATE: 92 BPM | SYSTOLIC BLOOD PRESSURE: 124 MMHG | OXYGEN SATURATION: 98 % | TEMPERATURE: 97.5 F | HEIGHT: 66 IN | WEIGHT: 166.9 LBS

## 2018-05-23 DIAGNOSIS — J22 LOWER RESPIRATORY TRACT INFECTION: Primary | ICD-10-CM

## 2018-05-23 DIAGNOSIS — J44.1 ACUTE EXACERBATION OF CHRONIC OBSTRUCTIVE AIRWAYS DISEASE (HCC): ICD-10-CM

## 2018-05-23 PROCEDURE — 99214 OFFICE O/P EST MOD 30 MIN: CPT | Performed by: FAMILY MEDICINE

## 2018-05-23 RX ORDER — BROMPHENIRAMINE MALEATE, PSEUDOEPHEDRINE HYDROCHLORIDE, AND DEXTROMETHORPHAN HYDROBROMIDE 2; 30; 10 MG/5ML; MG/5ML; MG/5ML
5 SYRUP ORAL 4 TIMES DAILY PRN
Qty: 473 ML | Refills: 1 | Status: SHIPPED | OUTPATIENT
Start: 2018-05-23 | End: 2018-07-11 | Stop reason: SDUPTHER

## 2018-05-23 RX ORDER — LEVOFLOXACIN 750 MG/1
750 TABLET ORAL DAILY
Qty: 7 TABLET | Refills: 0 | Status: SHIPPED | OUTPATIENT
Start: 2018-05-23 | End: 2018-07-11

## 2018-05-27 PROBLEM — J22 LOWER RESPIRATORY TRACT INFECTION: Status: ACTIVE | Noted: 2018-05-27

## 2018-05-28 NOTE — PROGRESS NOTES
Subjective    Yehuda Rutledge is a 67 y.o. overweight white male smoker with COPD, Allergies, H/O Lumbar back surgery, Lumbago/Sciatica, H/O ETOH abuse in remission, Abd Hernia (S/P incisional hernia repair), and other health problems see PMH/PSH. Pt presents for exam, to discuss health problem, tx plan and F/U plan.    ' Coughing , chest congestion, body aches, chills, started 3 days ago, OTC Cold -Flu med not helping'.    Fever    This is a new problem. The current episode started in the past 7 days. The problem occurs constantly. The problem has been gradually worsening. The maximum temperature noted was 100 to 100.9 F. Associated symptoms include congestion, coughing and muscle aches. Pertinent negatives include no abdominal pain, diarrhea, headaches, nausea, rash, sore throat, vomiting or wheezing. Treatments tried: Cold-Flu med. The treatment provided no relief.   Hoarse   This is a new problem. The current episode started in the past 7 days. Associated symptoms include chills, congestion, coughing, a fever and neck pain. Pertinent negatives include no abdominal pain, fatigue, headaches, myalgias, nausea, rash, sore throat or vomiting. The treatment provided no relief.   Cough   This is a new problem. The current episode started in the past 7 days. The problem has been gradually worsening. The cough is productive of sputum. Associated symptoms include chills and a fever. Pertinent negatives include no headaches, myalgias, postnasal drip, rash, sore throat, shortness of breath or wheezing. Risk factors for lung disease include smoking/tobacco exposure. He has tried OTC cough suppressant for the symptoms. The treatment provided mild relief. His past medical history is significant for COPD and environmental allergies.        The following portions of the patient's history were reviewed and updated as appropriate: allergies, current medications, past family history, past medical history, past social history, past  surgical history and problem list.    Review of Systems   Constitutional: Positive for chills and fever. Negative for fatigue.   HENT: Positive for congestion and hoarse voice. Negative for facial swelling, mouth sores, postnasal drip, sinus pressure and sore throat.    Eyes: Negative for pain, discharge and visual disturbance.   Respiratory: Positive for cough. Negative for chest tightness, shortness of breath and wheezing.    Cardiovascular: Negative for palpitations and leg swelling.   Gastrointestinal: Negative for abdominal pain, constipation, diarrhea, nausea and vomiting.        Abd pain resolved after hernia repair.   Endocrine: Negative.  Negative for cold intolerance and heat intolerance.   Genitourinary: Negative.  Negative for difficulty urinating, hematuria and urgency.   Musculoskeletal: Positive for back pain and neck pain. Negative for myalgias.        Neck and Back pain controlled with Neurontin   Skin: Negative.  Negative for color change, pallor, rash and wound.        Surgical incisions healing well.    Allergic/Immunologic: Positive for environmental allergies. Negative for food allergies and immunocompromised state.   Neurological: Negative for tremors, speech difficulty and headaches.   Hematological: Negative for adenopathy. Does not bruise/bleed easily.   Psychiatric/Behavioral: Negative for dysphoric mood. The patient is nervous/anxious. The patient is not hyperactive.        Objective   Physical Exam   Constitutional: He is oriented to person, place, and time. He appears well-developed and well-nourished. No distress.   HENT:   Head: Normocephalic and atraumatic.   Right Ear: External ear normal.   Left Ear: External ear normal.   Nose: Nose normal.   Mouth/Throat: Oropharynx is clear and moist. No oropharyngeal exudate.        Eyes: Conjunctivae and EOM are normal. Pupils are equal, round, and reactive to light. Right eye exhibits no discharge. Left eye exhibits no discharge. No scleral  icterus.   Neck: Neck supple. No JVD present. No tracheal deviation present. No thyromegaly present.   Has Full cervical ROM, WB 2.   Cardiovascular: Normal rate, regular rhythm, normal heart sounds and intact distal pulses.  Exam reveals no gallop and no friction rub.    No murmur heard.  Pulmonary/Chest: Effort normal. No stridor. No respiratory distress. He has no wheezes. He has no rales. He exhibits no tenderness.   Coarse BBS   Abdominal: Soft. Bowel sounds are normal. He exhibits no distension and no mass. There is no tenderness. There is no rebound and no guarding. No hernia.   Musculoskeletal: He exhibits no edema, tenderness or deformity.   Lymphadenopathy:     He has no cervical adenopathy.   Neurological: He is alert and oriented to person, place, and time. He has normal reflexes. He displays normal reflexes. No cranial nerve deficit. He exhibits normal muscle tone. Coordination normal.   Skin: Skin is warm and dry. No rash noted. He is not diaphoretic. No erythema. No pallor.   Psychiatric: He has a normal mood and affect. His behavior is normal. Judgment and thought content normal.   Nursing note and vitals reviewed.      Assessment/Plan   Yehuda was seen today for fever, hoarse and cough.    Diagnoses and all orders for this visit:    Lower respiratory tract infection    Acute exacerbation of chronic obstructive airways disease    Other orders  -     levoFLOXacin (LEVAQUIN) 750 MG tablet; Take 1 tablet by mouth Daily.  -     brompheniramine-pseudoephedrine-DM 30-2-10 MG/5ML syrup; Take 5 mL by mouth 4 (Four) Times a Day As Needed for Allergies.    Discussed current health problem, LRI, Tx and f/U plan.          This document has been electronically signed by Buzz Sifuentes MD

## 2018-07-11 ENCOUNTER — OFFICE VISIT (OUTPATIENT)
Dept: FAMILY MEDICINE CLINIC | Facility: CLINIC | Age: 68
End: 2018-07-11

## 2018-07-11 VITALS
HEART RATE: 83 BPM | WEIGHT: 167.7 LBS | HEIGHT: 66 IN | BODY MASS INDEX: 26.95 KG/M2 | TEMPERATURE: 98 F | OXYGEN SATURATION: 98 % | SYSTOLIC BLOOD PRESSURE: 128 MMHG | DIASTOLIC BLOOD PRESSURE: 82 MMHG

## 2018-07-11 DIAGNOSIS — Z91.09 ENVIRONMENTAL ALLERGIES: ICD-10-CM

## 2018-07-11 DIAGNOSIS — E78.5 HYPERLIPIDEMIA, UNSPECIFIED HYPERLIPIDEMIA TYPE: Primary | ICD-10-CM

## 2018-07-11 DIAGNOSIS — Z87.891 FORMER SMOKER: ICD-10-CM

## 2018-07-11 DIAGNOSIS — Z87.19 H/O UMBILICAL HERNIA REPAIR: ICD-10-CM

## 2018-07-11 DIAGNOSIS — J44.9 CHRONIC OBSTRUCTIVE PULMONARY DISEASE, UNSPECIFIED COPD TYPE (HCC): ICD-10-CM

## 2018-07-11 DIAGNOSIS — M54.12 CHRONIC CERVICAL RADICULOPATHY: ICD-10-CM

## 2018-07-11 DIAGNOSIS — E66.3 OVERWEIGHT (BMI 25.0-29.9): ICD-10-CM

## 2018-07-11 DIAGNOSIS — Z79.899 HIGH RISK MEDICATION USE: ICD-10-CM

## 2018-07-11 DIAGNOSIS — Z98.890 H/O UMBILICAL HERNIA REPAIR: ICD-10-CM

## 2018-07-11 PROCEDURE — 99214 OFFICE O/P EST MOD 30 MIN: CPT | Performed by: FAMILY MEDICINE

## 2018-07-11 RX ORDER — BROMPHENIRAMINE MALEATE, PSEUDOEPHEDRINE HYDROCHLORIDE, AND DEXTROMETHORPHAN HYDROBROMIDE 2; 30; 10 MG/5ML; MG/5ML; MG/5ML
5 SYRUP ORAL 4 TIMES DAILY PRN
Qty: 473 ML | Refills: 1 | Status: SHIPPED | OUTPATIENT
Start: 2018-07-11 | End: 2018-10-20

## 2018-07-11 RX ORDER — MELOXICAM 7.5 MG/1
7.5 TABLET ORAL DAILY
Qty: 30 TABLET | Refills: 3 | Status: SHIPPED | OUTPATIENT
Start: 2018-07-11 | End: 2018-10-17

## 2018-07-11 RX ORDER — GABAPENTIN 100 MG/1
100 CAPSULE ORAL 2 TIMES DAILY
Qty: 60 CAPSULE | Refills: 3 | Status: SHIPPED | OUTPATIENT
Start: 2018-07-11 | End: 2018-10-17 | Stop reason: SDUPTHER

## 2018-07-11 RX ORDER — PNEUMOCOCCAL 13-VALENT CONJUGATE VACCINE 2.2; 2.2; 2.2; 2.2; 2.2; 4.4; 2.2; 2.2; 2.2; 2.2; 2.2; 2.2; 2.2 UG/.5ML; UG/.5ML; UG/.5ML; UG/.5ML; UG/.5ML; UG/.5ML; UG/.5ML; UG/.5ML; UG/.5ML; UG/.5ML; UG/.5ML; UG/.5ML; UG/.5ML
INJECTION, SUSPENSION INTRAMUSCULAR
Refills: 0 | COMMUNITY
Start: 2018-05-07 | End: 2018-07-11

## 2018-07-11 NOTE — PROGRESS NOTES
Subjective    Yehuda Rutledge is a 67 y.o. overweight white male smoker with COPD, Allergies, H/O Lumbar back surgery, Lumbago/Sciatica, H/O ETOH abuse in remission, Abd Hernia (S/P incisional hernia repair), and other health problems see PMH/PSH. Pt presents for exam, to discuss health problem, Tx and F/U plans.    ' Doing OK now recent URI, fever resolved after Abx. B/P has been good at checks. Hernia repair remains good. Hand and chronic neck pain helped by Neurontin. Have continued to abstain from smoking'      Hypertension   This is a chronic problem. The current episode started more than 1 year ago. The problem has been gradually improving since onset. The problem is controlled. Associated symptoms include neck pain. Pertinent negatives include no headaches, palpitations or shortness of breath. Agents associated with hypertension include NSAIDs. Risk factors for coronary artery disease include obesity, smoking/tobacco exposure and dyslipidemia. Past treatments include lifestyle changes. Current antihypertension treatment includes lifestyle changes. The current treatment provides significant improvement.   Hand Pain    Incident onset: Chronic. There was no injury mechanism (Work). The pain is present in the right hand, left fingers, left hand and right fingers. The pain is at a severity of 4/10. The pain is moderate. He has tried NSAIDs (Neurontin) for the symptoms. The treatment provided moderate relief.   Fever    This is a new problem. The current episode started more than 1 month ago. The problem occurs constantly. The problem has been resolved. Pertinent negatives include no abdominal pain, congestion, coughing, diarrhea, headaches, muscle aches, nausea, rash, sore throat, vomiting or wheezing. Treatments tried: Cold-Flu med. The treatment provided no relief.   Hoarse   This is a new problem. The current episode started in the past 7 days. The problem has been resolved. Associated symptoms include neck pain.  Pertinent negatives include no abdominal pain, chills, congestion, coughing, fatigue, fever, headaches, myalgias, nausea, rash, sore throat or vomiting. Treatments tried: Abx. The treatment provided significant relief.   Cough   This is a new problem. The current episode started 1 to 4 weeks ago. The problem has been gradually improving. The cough is productive of sputum. Pertinent negatives include no chills, fever, headaches, myalgias, postnasal drip, rash, sore throat, shortness of breath or wheezing. Risk factors for lung disease include smoking/tobacco exposure. He has tried OTC cough suppressant for the symptoms. The treatment provided mild relief. His past medical history is significant for COPD and environmental allergies.        The following portions of the patient's history were reviewed and updated as appropriate: allergies, current medications, past family history, past medical history, past social history, past surgical history and problem list.    Review of Systems   Constitutional: Negative for chills, fatigue and fever.   HENT: Positive for hoarse voice. Negative for congestion, facial swelling, mouth sores, postnasal drip, sinus pressure and sore throat.    Eyes: Negative for pain, discharge and visual disturbance.   Respiratory: Negative for cough, chest tightness, shortness of breath and wheezing.    Cardiovascular: Negative for palpitations and leg swelling.   Gastrointestinal: Negative for abdominal pain, constipation, diarrhea, nausea and vomiting.        Abd pain resolved after hernia repair.   Endocrine: Negative.  Negative for cold intolerance and heat intolerance.   Genitourinary: Negative.  Negative for difficulty urinating, hematuria and urgency.   Musculoskeletal: Positive for back pain and neck pain. Negative for myalgias.        Neck and Back pain controlled with Neurontin   Skin: Negative.  Negative for color change, pallor, rash and wound.        Surgical incisions healing well.     Allergic/Immunologic: Positive for environmental allergies. Negative for food allergies and immunocompromised state.   Neurological: Negative for tremors, speech difficulty and headaches.   Hematological: Negative for adenopathy. Does not bruise/bleed easily.   Psychiatric/Behavioral: Negative for dysphoric mood. The patient is nervous/anxious. The patient is not hyperactive.        Objective   Physical Exam   Constitutional: He is oriented to person, place, and time. He appears well-developed and well-nourished. No distress.   HENT:   Head: Normocephalic and atraumatic.   Right Ear: External ear normal.   Left Ear: External ear normal.   Nose: Nose normal.   Mouth/Throat: Oropharynx is clear and moist. No oropharyngeal exudate.        Eyes: Conjunctivae and EOM are normal. Pupils are equal, round, and reactive to light. Right eye exhibits no discharge. Left eye exhibits no discharge. No scleral icterus.   Neck: Neck supple. No JVD present. No tracheal deviation present. No thyromegaly present.   Has Full cervical ROM, WB 2.   Cardiovascular: Normal rate, regular rhythm, normal heart sounds and intact distal pulses.  Exam reveals no gallop and no friction rub.    No murmur heard.  Pulmonary/Chest: Effort normal and breath sounds normal. No stridor. No respiratory distress. He has no wheezes. He has no rales. He exhibits no tenderness.   Coarse BBS   Abdominal: Soft. Bowel sounds are normal. He exhibits no distension and no mass. There is no tenderness. There is no rebound and no guarding. No hernia.   Musculoskeletal: He exhibits no edema, tenderness or deformity.   Lymphadenopathy:     He has no cervical adenopathy.   Neurological: He is alert and oriented to person, place, and time. He has normal reflexes. He displays normal reflexes. No cranial nerve deficit. He exhibits normal muscle tone. Coordination normal.   Skin: Skin is warm and dry. No rash noted. He is not diaphoretic. No erythema. No pallor.    Psychiatric: He has a normal mood and affect. His behavior is normal. Judgment and thought content normal.   Nursing note and vitals reviewed.      Assessment/Plan   Yehuda was seen today for hypertension and hand pain.    Diagnoses and all orders for this visit:    Hyperlipidemia, unspecified hyperlipidemia type    Chronic cervical radiculopathy    Chronic obstructive pulmonary disease, unspecified COPD type (CMS/HCC)    High risk medication use    Overweight (BMI 25.0-29.9)    H/O umbilical hernia repair    Former smoker    Environmental allergies    Other orders  -     brompheniramine-pseudoephedrine-DM 30-2-10 MG/5ML syrup; Take 5 mL by mouth 4 (Four) Times a Day As Needed for Allergies.  -     gabapentin (NEURONTIN) 100 MG capsule; Take 1 capsule by mouth 2 (Two) Times a Day.  -     meloxicam (MOBIC) 7.5 MG tablet; Take 1 tablet by mouth Daily.      Discussed current health problems, meds, indications, tx plan, rationale. Discussed Neurontin as controlled med. Discussed safety with meds. Discussed f/u kali.          This document has been electronically signed by Buzz Sifuentes MD

## 2018-07-14 PROBLEM — M19.90 ARTHRITIS: Status: ACTIVE | Noted: 2018-07-14

## 2018-08-17 ENCOUNTER — PROCEDURE VISIT (OUTPATIENT)
Dept: FAMILY MEDICINE CLINIC | Facility: CLINIC | Age: 68
End: 2018-08-17

## 2018-08-17 VITALS
TEMPERATURE: 98.5 F | DIASTOLIC BLOOD PRESSURE: 72 MMHG | OXYGEN SATURATION: 98 % | HEIGHT: 66 IN | WEIGHT: 171.7 LBS | SYSTOLIC BLOOD PRESSURE: 134 MMHG | HEART RATE: 86 BPM | BODY MASS INDEX: 27.59 KG/M2

## 2018-08-17 DIAGNOSIS — L57.0 ACTINIC KERATOSIS: ICD-10-CM

## 2018-08-17 DIAGNOSIS — Z91.09 ENVIRONMENTAL ALLERGIES: Primary | ICD-10-CM

## 2018-08-17 PROCEDURE — 17000 DESTRUCT PREMALG LESION: CPT | Performed by: FAMILY MEDICINE

## 2018-08-17 PROCEDURE — 17003 DESTRUCT PREMALG LES 2-14: CPT | Performed by: FAMILY MEDICINE

## 2018-08-17 PROCEDURE — 99213 OFFICE O/P EST LOW 20 MIN: CPT | Performed by: FAMILY MEDICINE

## 2018-08-20 PROBLEM — L57.0 ACTINIC KERATOSIS: Status: ACTIVE | Noted: 2018-08-20

## 2018-08-21 NOTE — PROGRESS NOTES
"Procedure   Destruction of Lesion  Date/Time: 8/17/2018 10:30 AM  Performed by: HEMANT BARRON  Authorized by: HEMANT BARRON   Consent: Verbal consent obtained.  Risks and benefits: risks, benefits and alternatives were discussed  Consent given by: patient  Patient understanding: patient states understanding of the procedure being performed  Patient consent: the patient's understanding of the procedure matches consent given  Site marked: the operative site was marked  Patient identity confirmed: verbally with patient  Time out: Immediately prior to procedure a \"time out\" was called to verify the correct patient, procedure, equipment, support staff and site/side marked as required.  Local anesthesia used: no    Anesthesia:  Local anesthesia used: no  Patient tolerance: Patient tolerated the procedure well with no immediate complications  Comments:   Five (5) Pre cancerous appearing lesions were destroyed with Cryo freeze thaw freeze technique..  6mm R pre auricular, 3 lesion on forehead 2-3 mm each, L cheek 4mm lesion. Wound care, F/U discussed.            "

## 2018-08-21 NOTE — PROGRESS NOTES
Subjective   Yehuda Rutledge is a 68 y.o. overweight white male smoker with COPD, Allergies, H/O Lumbar back surgery, Lumbago/Sciatica, H/O ETOH abuse in remission, Abd Hernia (S/P incisional hernia repair), and other health problems see PMH/PSH. Pt presents for destruction of precancerous skin lesions.   Allergies flaring , need some meds.  Need skin cancers destroyed. Patricia R sideburn , staying irritated, color changing.     Allergies   This is a chronic problem. The problem occurs constantly. The problem has been gradually improving. Associated symptoms include congestion, headaches and neck pain. Pertinent negatives include no abdominal pain, chills, coughing, fatigue, fever, myalgias, nausea, rash, sore throat or vomiting. Exacerbated by: Pollen. Treatments tried: Antihistamines, Nasal steroids. The treatment provided mild relief.        The following portions of the patient's history were reviewed and updated as appropriate: allergies, current medications, past family history, past medical history, past social history, past surgical history and problem list.    Review of Systems   Constitutional: Negative for chills, fatigue and fever.   HENT: Positive for congestion. Negative for facial swelling, mouth sores, postnasal drip, sinus pressure and sore throat.    Eyes: Negative for pain, discharge and visual disturbance.   Respiratory: Negative for cough, chest tightness, shortness of breath and wheezing.    Cardiovascular: Negative for palpitations and leg swelling.   Gastrointestinal: Negative for abdominal pain, constipation, diarrhea, nausea and vomiting.   Endocrine: Negative.  Negative for cold intolerance and heat intolerance.   Genitourinary: Negative.  Negative for difficulty urinating, hematuria and urgency.   Musculoskeletal: Positive for back pain and neck pain. Negative for myalgias.        Neck and Back pain controlled with Neurontin   Skin: Negative.  Negative for color change, pallor, rash and  wound.        Skin cancers on face and fore head.   Allergic/Immunologic: Positive for environmental allergies. Negative for food allergies and immunocompromised state.   Neurological: Positive for headaches. Negative for tremors and speech difficulty.   Hematological: Negative for adenopathy. Does not bruise/bleed easily.   Psychiatric/Behavioral: Negative for dysphoric mood. The patient is not nervous/anxious and is not hyperactive.        Objective   Physical Exam   Constitutional: He is oriented to person, place, and time. He appears well-developed and well-nourished. No distress.   HENT:   Head: Normocephalic.   Right Ear: External ear normal.   Left Ear: External ear normal.   Nose: Nose normal.   Mouth/Throat: Oropharynx is clear and moist.   Eyes: Pupils are equal, round, and reactive to light. Conjunctivae and EOM are normal. Right eye exhibits no discharge. Left eye exhibits no discharge. No scleral icterus.   Neck: No JVD present. No tracheal deviation present. No thyromegaly present.   Cardiovascular: Normal rate and normal heart sounds.  Exam reveals no gallop and no friction rub.    No murmur heard.  Pulmonary/Chest: Effort normal and breath sounds normal. No stridor. No respiratory distress. He has no wheezes. He has no rales. He exhibits no tenderness.   Abdominal: Bowel sounds are normal. He exhibits no distension and no mass. There is no tenderness. There is no rebound and no guarding. No hernia.   Musculoskeletal: Normal range of motion. He exhibits no edema, tenderness or deformity.   Lymphadenopathy:     He has no cervical adenopathy.   Neurological: He is alert and oriented to person, place, and time. He has normal reflexes. He displays normal reflexes. No cranial nerve deficit. He exhibits normal muscle tone. Coordination normal.   Skin: Skin is warm and dry. No rash noted. He is not diaphoretic. No erythema. No pallor.   5 Actinic Keratosis irritated, on face and forehead.    Psychiatric: He  has a normal mood and affect. His behavior is normal. Judgment and thought content normal.       Assessment/Plan   Yehuda was seen today for skin lesion and allergies.    Diagnoses and all orders for this visit:    Environmental allergies    Actinic keratosis      Discussed Allergies, resume Bromafed, for acute flare, restart Flonase, OTC antihistamine. Discussed Actinic Keratosis, as pre-cancerous lesions. Discussed destruction of lesions, Cryo therapy, Pt desires to proceed. Consent obtained.          This document has been electronically signed by Buzz Sifuentes MD

## 2018-10-17 ENCOUNTER — OFFICE VISIT (OUTPATIENT)
Dept: FAMILY MEDICINE CLINIC | Facility: CLINIC | Age: 68
End: 2018-10-17

## 2018-10-17 VITALS
HEIGHT: 66 IN | WEIGHT: 172.4 LBS | SYSTOLIC BLOOD PRESSURE: 136 MMHG | DIASTOLIC BLOOD PRESSURE: 82 MMHG | OXYGEN SATURATION: 99 % | HEART RATE: 82 BPM | BODY MASS INDEX: 27.71 KG/M2 | TEMPERATURE: 98 F

## 2018-10-17 DIAGNOSIS — Z87.891 FORMER SMOKER: ICD-10-CM

## 2018-10-17 DIAGNOSIS — J44.9 CHRONIC OBSTRUCTIVE PULMONARY DISEASE, UNSPECIFIED COPD TYPE (HCC): ICD-10-CM

## 2018-10-17 DIAGNOSIS — M54.12 CHRONIC CERVICAL RADICULOPATHY: ICD-10-CM

## 2018-10-17 DIAGNOSIS — G89.29 CHRONIC RIGHT-SIDED LOW BACK PAIN WITH RIGHT-SIDED SCIATICA: ICD-10-CM

## 2018-10-17 DIAGNOSIS — E66.3 OVERWEIGHT (BMI 25.0-29.9): ICD-10-CM

## 2018-10-17 DIAGNOSIS — E78.5 HYPERLIPIDEMIA, UNSPECIFIED HYPERLIPIDEMIA TYPE: Primary | ICD-10-CM

## 2018-10-17 DIAGNOSIS — Z87.19 H/O UMBILICAL HERNIA REPAIR: ICD-10-CM

## 2018-10-17 DIAGNOSIS — M54.41 CHRONIC RIGHT-SIDED LOW BACK PAIN WITH RIGHT-SIDED SCIATICA: ICD-10-CM

## 2018-10-17 DIAGNOSIS — Z98.890 H/O UMBILICAL HERNIA REPAIR: ICD-10-CM

## 2018-10-17 PROCEDURE — 99214 OFFICE O/P EST MOD 30 MIN: CPT | Performed by: FAMILY MEDICINE

## 2018-10-17 RX ORDER — GABAPENTIN 100 MG/1
100 CAPSULE ORAL 2 TIMES DAILY
Qty: 60 CAPSULE | Refills: 3 | Status: SHIPPED | OUTPATIENT
Start: 2018-10-17 | End: 2019-02-20 | Stop reason: SDUPTHER

## 2018-10-17 NOTE — PROGRESS NOTES
Subjective   Yehuda Rutledge is a 68 y.o. overweight white male former smoker with COPD, Allergies, H/O Lumbar back surgery, Lumbago/Sciatica, H/O ETOH abuse in remission, Abd Hernia (S/P incisional hernia repair), and other health problems see PMH/PSH. Pt presents for exam, to discuss health problems, Tx and F/U plans.     ' Allergies have been controlled on nasal steroid and allergy pills. Breathing problems controlled with Albuterol inhaler when needed. Neck and back pain Tolerable on OTC  And Neurontin. No new problems otherwise'.    Back Pain   This is a chronic problem. The current episode started more than 1 month ago. The problem occurs daily. The problem has been gradually improving since onset. The pain is present in the lumbar spine (Right Lumbar pain radiating down R lat leg. ). The quality of the pain is described as aching, shooting and stabbing. The pain radiates to the right thigh, right knee and right foot. The pain is at a severity of 1/10. The pain is mild. The symptoms are aggravated by standing, twisting and position. Stiffness is present all day. Pertinent negatives include no abdominal pain, dysuria or fever. (Pain and numbness radiating down R outer leg) Risk factors include poor posture. He has tried analgesics, bed rest, heat, chiropractic manipulation, home exercises, muscle relaxant, NSAIDs, walking and ice for the symptoms. The treatment provided significant relief.   COPD   This is a chronic problem. The current episode started more than 1 year ago. The problem occurs daily. The problem has been unchanged. Associated symptoms include neck pain. Pertinent negatives include no abdominal pain, chills, congestion, coughing, fatigue, fever, myalgias or sore throat. The symptoms are aggravated by coughing, exertion and smoking. The treatment provided mild relief.   Neck Pain    This is a chronic problem. The current episode started more than 1 year ago. The problem occurs daily. The problem  has been waxing and waning. The pain is associated with an unknown factor. The pain is at a severity of 4/10. The pain is moderate. The pain is worse during the night. Pertinent negatives include no fever. He has tried acetaminophen, NSAIDs, muscle relaxants, chiropractic manipulation, home exercises and heat (Neurontin) for the symptoms. The treatment provided moderate relief.   Hyperlipidemia   This is a chronic problem. The current episode started more than 1 year ago. The problem is controlled. Recent lipid tests were reviewed and are variable. Factors aggravating his hyperlipidemia include smoking. Pertinent negatives include no myalgias or shortness of breath. Current antihyperlipidemic treatment includes statins and diet change. The current treatment provides moderate improvement of lipids. Risk factors for coronary artery disease include male sex and dyslipidemia.        The following portions of the patient's history were reviewed and updated as appropriate: allergies, current medications, past family history, past medical history, past social history, past surgical history and problem list.    Review of Systems   Constitutional: Negative for chills, fatigue and fever.   HENT: Negative for congestion, postnasal drip and sore throat.    Eyes: Negative for itching and visual disturbance.   Respiratory: Negative for cough, shortness of breath and wheezing.    Cardiovascular: Negative for palpitations and leg swelling.   Gastrointestinal: Negative for abdominal pain, blood in stool, constipation and diarrhea.   Endocrine: Negative.    Genitourinary: Negative.  Negative for difficulty urinating, dysuria and scrotal swelling.   Musculoskeletal: Positive for back pain and neck pain. Negative for myalgias.   Skin: Positive for wound.        Areas where skin cancers frozen have healed.   Allergic/Immunologic: Negative.    Neurological: Negative.    Hematological: Does not bruise/bleed easily.    Psychiatric/Behavioral: Negative.  Negative for agitation, dysphoric mood and sleep disturbance.       Objective   Physical Exam   Constitutional: He is oriented to person, place, and time. He appears well-developed and well-nourished. No distress.   HENT:   Head: Normocephalic and atraumatic.   Right Ear: External ear normal.   Left Ear: External ear normal.   Nose: Nose normal.   Mouth/Throat: Oropharynx is clear and moist. No oropharyngeal exudate.        Eyes: Pupils are equal, round, and reactive to light. Conjunctivae and EOM are normal. Right eye exhibits no discharge. Left eye exhibits no discharge. No scleral icterus.   Neck: Neck supple. No JVD present. No tracheal deviation present. No thyromegaly present.   Has Full cervical ROM, WB 2.   Cardiovascular: Normal rate, regular rhythm, normal heart sounds and intact distal pulses.  Exam reveals no gallop and no friction rub.    No murmur heard.  Pulmonary/Chest: Effort normal and breath sounds normal. No stridor. No respiratory distress. He has no wheezes. He has no rales. He exhibits no tenderness.   Abdominal: Soft. Bowel sounds are normal. He exhibits no distension and no mass. There is no tenderness. There is no rebound and no guarding. No hernia.   Musculoskeletal: He exhibits no edema, tenderness or deformity.   WB 4 with ROM back.   Lymphadenopathy:     He has no cervical adenopathy.   Neurological: He is alert and oriented to person, place, and time. He has normal reflexes. He displays normal reflexes. No cranial nerve deficit. He exhibits normal muscle tone. Coordination normal.   Skin: Skin is warm and dry. No rash noted. He is not diaphoretic. No erythema. No pallor.   Psychiatric: He has a normal mood and affect. His behavior is normal. Judgment and thought content normal.   Nursing note and vitals reviewed.      Assessment/Plan   Yehuda was seen today for hypertension and copd.    Diagnoses and all orders for this visit:    Hyperlipidemia,  unspecified hyperlipidemia type    Chronic obstructive pulmonary disease, unspecified COPD type (CMS/HCC)    Chronic cervical radiculopathy    Chronic right-sided low back pain with right-sided sciatica    Former smoker    H/O umbilical hernia repair    Overweight (BMI 25.0-29.9)    Other orders  -     gabapentin (NEURONTIN) 100 MG capsule; Take 1 capsule by mouth 2 (Two) Times a Day.      Discussed exam, meds, indications, tx plan, rationale. Discussed F/U Neurontin as controlled med, safety with controlled meds. Discussed USPSTF recommendations. Discussed F/U plan.          This document has been electronically signed by Buzz Sifuentes MD

## 2018-10-20 NOTE — PATIENT INSTRUCTIONS
Preventive Care 65 Years and Older, Male  Preventive care refers to lifestyle choices and visits with your health care provider that can promote health and wellness.  What does preventive care include?  · A yearly physical exam. This is also called an annual well check.  · Dental exams once or twice a year.  · Routine eye exams. Ask your health care provider how often you should have your eyes checked.  · Personal lifestyle choices, including:  ? Daily care of your teeth and gums.  ? Regular physical activity.  ? Eating a healthy diet.  ? Avoiding tobacco and drug use.  ? Limiting alcohol use.  ? Practicing safe sex.  ? Taking low doses of aspirin every day.  ? Taking vitamin and mineral supplements as recommended by your health care provider.  What happens during an annual well check?  The services and screenings done by your health care provider during your annual well check will depend on your age, overall health, lifestyle risk factors, and family history of disease.  Counseling  Your health care provider may ask you questions about your:  · Alcohol use.  · Tobacco use.  · Drug use.  · Emotional well-being.  · Home and relationship well-being.  · Sexual activity.  · Eating habits.  · History of falls.  · Memory and ability to understand (cognition).  · Work and work environment.    Screening  You may have the following tests or measurements:  · Height, weight, and BMI.  · Blood pressure.  · Lipid and cholesterol levels. These may be checked every 5 years, or more frequently if you are over 50 years old.  · Skin check.  · Lung cancer screening. You may have this screening every year starting at age 55 if you have a 30-pack-year history of smoking and currently smoke or have quit within the past 15 years.  · Fecal occult blood test (FOBT) of the stool. You may have this test every year starting at age 50.  · Flexible sigmoidoscopy or colonoscopy. You may have a sigmoidoscopy every 5 years or a colonoscopy every 10  years starting at age 50.  · Prostate cancer screening. Recommendations will vary depending on your family history and other risks.  · Hepatitis C blood test.  · Hepatitis B blood test.  · Sexually transmitted disease (STD) testing.  · Diabetes screening. This is done by checking your blood sugar (glucose) after you have not eaten for a while (fasting). You may have this done every 1-3 years.  · Abdominal aortic aneurysm (AAA) screening. You may need this if you are a current or former smoker.  · Osteoporosis. You may be screened starting at age 70 if you are at high risk.    Talk with your health care provider about your test results, treatment options, and if necessary, the need for more tests.  Vaccines  Your health care provider may recommend certain vaccines, such as:  · Influenza vaccine. This is recommended every year.  · Tetanus, diphtheria, and acellular pertussis (Tdap, Td) vaccine. You may need a Td booster every 10 years.  · Varicella vaccine. You may need this if you have not been vaccinated.  · Zoster vaccine. You may need this after age 60.  · Measles, mumps, and rubella (MMR) vaccine. You may need at least one dose of MMR if you were born in 1957 or later. You may also need a second dose.  · Pneumococcal 13-valent conjugate (PCV13) vaccine. One dose is recommended after age 65.  · Pneumococcal polysaccharide (PPSV23) vaccine. One dose is recommended after age 65.  · Meningococcal vaccine. You may need this if you have certain conditions.  · Hepatitis A vaccine. You may need this if you have certain conditions or if you travel or work in places where you may be exposed to hepatitis A.  · Hepatitis B vaccine. You may need this if you have certain conditions or if you travel or work in places where you may be exposed to hepatitis B.  · Haemophilus influenzae type b (Hib) vaccine. You may need this if you have certain risk factors.    Talk to your health care provider about which screenings and vaccines  you need and how often you need them.  This information is not intended to replace advice given to you by your health care provider. Make sure you discuss any questions you have with your health care provider.  Document Released: 01/13/2017 Document Revised: 09/06/2017 Document Reviewed: 10/18/2016  [employer] Interactive Patient Education © 2018 [employer]

## 2018-11-19 ENCOUNTER — TELEPHONE (OUTPATIENT)
Dept: FAMILY MEDICINE CLINIC | Facility: CLINIC | Age: 68
End: 2018-11-19

## 2018-11-19 RX ORDER — AZITHROMYCIN 250 MG/1
TABLET, FILM COATED ORAL
Qty: 6 TABLET | Refills: 0 | Status: SHIPPED | OUTPATIENT
Start: 2018-11-19 | End: 2019-02-20

## 2018-11-19 RX ORDER — GUAIFENESIN AND DEXTROMETHORPHAN HYDROBROMIDE 600; 30 MG/1; MG/1
1 TABLET, EXTENDED RELEASE ORAL 2 TIMES DAILY
Qty: 30 TABLET | Refills: 0 | Status: SHIPPED | OUTPATIENT
Start: 2018-11-19 | End: 2019-05-23

## 2018-11-19 NOTE — TELEPHONE ENCOUNTER
Pt left VM c/o cough, congestion, sore throat.  Would like to know if could have a prescription sent to the pharmacy for him please.

## 2019-01-24 RX ORDER — PRAVASTATIN SODIUM 20 MG
20 TABLET ORAL NIGHTLY
Qty: 90 TABLET | Refills: 1 | Status: SHIPPED | OUTPATIENT
Start: 2019-01-24 | End: 2020-02-14 | Stop reason: SDUPTHER

## 2019-02-20 ENCOUNTER — OFFICE VISIT (OUTPATIENT)
Dept: FAMILY MEDICINE CLINIC | Facility: CLINIC | Age: 69
End: 2019-02-20

## 2019-02-20 VITALS
BODY MASS INDEX: 28.42 KG/M2 | OXYGEN SATURATION: 98 % | HEIGHT: 66 IN | DIASTOLIC BLOOD PRESSURE: 76 MMHG | WEIGHT: 176.8 LBS | TEMPERATURE: 98 F | SYSTOLIC BLOOD PRESSURE: 138 MMHG | HEART RATE: 84 BPM

## 2019-02-20 DIAGNOSIS — M79.672 FOOT PAIN, BILATERAL: Primary | ICD-10-CM

## 2019-02-20 DIAGNOSIS — F51.01 PRIMARY INSOMNIA: ICD-10-CM

## 2019-02-20 DIAGNOSIS — J44.9 CHRONIC OBSTRUCTIVE PULMONARY DISEASE, UNSPECIFIED COPD TYPE (HCC): ICD-10-CM

## 2019-02-20 DIAGNOSIS — E78.5 HYPERLIPIDEMIA, UNSPECIFIED HYPERLIPIDEMIA TYPE: ICD-10-CM

## 2019-02-20 DIAGNOSIS — M19.90 ARTHRITIS: ICD-10-CM

## 2019-02-20 DIAGNOSIS — M79.671 FOOT PAIN, BILATERAL: Primary | ICD-10-CM

## 2019-02-20 DIAGNOSIS — Z00.00 ROUTINE MEDICAL EXAM: ICD-10-CM

## 2019-02-20 DIAGNOSIS — Z79.899 HIGH RISK MEDICATION USE: ICD-10-CM

## 2019-02-20 DIAGNOSIS — E66.3 OVERWEIGHT (BMI 25.0-29.9): ICD-10-CM

## 2019-02-20 PROCEDURE — 99214 OFFICE O/P EST MOD 30 MIN: CPT | Performed by: FAMILY MEDICINE

## 2019-02-20 RX ORDER — GABAPENTIN 100 MG/1
100 CAPSULE ORAL 2 TIMES DAILY
Qty: 60 CAPSULE | Refills: 3 | Status: SHIPPED | OUTPATIENT
Start: 2019-02-20 | End: 2019-05-23 | Stop reason: SDUPTHER

## 2019-02-20 NOTE — PATIENT INSTRUCTIONS
Preventive Care 65 Years and Older, Male  Preventive care refers to lifestyle choices and visits with your health care provider that can promote health and wellness.  What does preventive care include?  · A yearly physical exam. This is also called an annual well check.  · Dental exams once or twice a year.  · Routine eye exams. Ask your health care provider how often you should have your eyes checked.  · Personal lifestyle choices, including:  ? Daily care of your teeth and gums.  ? Regular physical activity.  ? Eating a healthy diet.  ? Avoiding tobacco and drug use.  ? Limiting alcohol use.  ? Practicing safe sex.  ? Taking low doses of aspirin every day.  ? Taking vitamin and mineral supplements as recommended by your health care provider.  What happens during an annual well check?  The services and screenings done by your health care provider during your annual well check will depend on your age, overall health, lifestyle risk factors, and family history of disease.  Counseling  Your health care provider may ask you questions about your:  · Alcohol use.  · Tobacco use.  · Drug use.  · Emotional well-being.  · Home and relationship well-being.  · Sexual activity.  · Eating habits.  · History of falls.  · Memory and ability to understand (cognition).  · Work and work environment.    Screening  You may have the following tests or measurements:  · Height, weight, and BMI.  · Blood pressure.  · Lipid and cholesterol levels. These may be checked every 5 years, or more frequently if you are over 50 years old.  · Skin check.  · Lung cancer screening. You may have this screening every year starting at age 55 if you have a 30-pack-year history of smoking and currently smoke or have quit within the past 15 years.  · Fecal occult blood test (FOBT) of the stool. You may have this test every year starting at age 50.  · Flexible sigmoidoscopy or colonoscopy. You may have a sigmoidoscopy every 5 years or a colonoscopy every 10  years starting at age 50.  · Prostate cancer screening. Recommendations will vary depending on your family history and other risks.  · Hepatitis C blood test.  · Hepatitis B blood test.  · Sexually transmitted disease (STD) testing.  · Diabetes screening. This is done by checking your blood sugar (glucose) after you have not eaten for a while (fasting). You may have this done every 1-3 years.  · Abdominal aortic aneurysm (AAA) screening. You may need this if you are a current or former smoker.  · Osteoporosis. You may be screened starting at age 70 if you are at high risk.    Talk with your health care provider about your test results, treatment options, and if necessary, the need for more tests.  Vaccines  Your health care provider may recommend certain vaccines, such as:  · Influenza vaccine. This is recommended every year.  · Tetanus, diphtheria, and acellular pertussis (Tdap, Td) vaccine. You may need a Td booster every 10 years.  · Varicella vaccine. You may need this if you have not been vaccinated.  · Zoster vaccine. You may need this after age 60.  · Measles, mumps, and rubella (MMR) vaccine. You may need at least one dose of MMR if you were born in 1957 or later. You may also need a second dose.  · Pneumococcal 13-valent conjugate (PCV13) vaccine. One dose is recommended after age 65.  · Pneumococcal polysaccharide (PPSV23) vaccine. One dose is recommended after age 65.  · Meningococcal vaccine. You may need this if you have certain conditions.  · Hepatitis A vaccine. You may need this if you have certain conditions or if you travel or work in places where you may be exposed to hepatitis A.  · Hepatitis B vaccine. You may need this if you have certain conditions or if you travel or work in places where you may be exposed to hepatitis B.  · Haemophilus influenzae type b (Hib) vaccine. You may need this if you have certain risk factors.    Talk to your health care provider about which screenings and vaccines  you need and how often you need them.  This information is not intended to replace advice given to you by your health care provider. Make sure you discuss any questions you have with your health care provider.  Document Released: 01/13/2017 Document Revised: 09/06/2017 Document Reviewed: 10/18/2016  [employer] Interactive Patient Education © 2018 [employer]      Have fasting labs drawn after 1st week March.

## 2019-02-20 NOTE — PROGRESS NOTES
Subjective    Yehuda Rutledge is a 68 y.o. overweight white male former smoker with COPD, Allergies, H/O Lumbar back surgery, Lumbago/Sciatica, H/O ETOH abuse in remission, Abd Hernia (S/P incisional hernia repair), and other health problems see PMH/PSH. Pt presents for exam, to discuss health problems, Tx and F/U plans.    ' R ankle and L heel hurt, need to get checked out. Low back pain stable with Neurontin. Breathing has been doing OK'.      Back Pain   This is a chronic problem. The current episode started more than 1 month ago. The problem occurs daily. The problem has been gradually improving since onset. The pain is present in the lumbar spine (Right Lumbar pain radiating down R lat leg. ). The quality of the pain is described as aching, shooting and stabbing. The pain radiates to the right thigh, right knee and right foot. The pain is at a severity of 1/10. The pain is mild. The symptoms are aggravated by standing, twisting and position. Stiffness is present all day. Pertinent negatives include no abdominal pain, dysuria or fever. (Pain and numbness radiating down R outer leg) Risk factors include poor posture. He has tried analgesics, bed rest, heat, chiropractic manipulation, home exercises, muscle relaxant, NSAIDs, walking and ice for the symptoms. The treatment provided significant relief.   COPD   This is a chronic problem. The current episode started more than 1 year ago. The problem occurs daily. The problem has been unchanged. Associated symptoms include arthralgias. Pertinent negatives include no abdominal pain, chills, congestion, coughing, fatigue, fever, myalgias or sore throat. The symptoms are aggravated by coughing, exertion and smoking. The treatment provided mild relief.   Neck Pain    This is a chronic problem. The current episode started more than 1 year ago. The problem occurs daily. The problem has been waxing and waning. The pain is associated with an unknown factor. The pain is at a  severity of 4/10. The pain is moderate. The pain is worse during the night. Pertinent negatives include no fever. He has tried acetaminophen, NSAIDs, muscle relaxants, chiropractic manipulation, home exercises and heat (Neurontin) for the symptoms. The treatment provided moderate relief.   Hyperlipidemia   This is a chronic problem. The current episode started more than 1 year ago. The problem is controlled. Recent lipid tests were reviewed and are variable. Factors aggravating his hyperlipidemia include smoking. Pertinent negatives include no myalgias or shortness of breath. Current antihyperlipidemic treatment includes statins and diet change. The current treatment provides moderate improvement of lipids. Risk factors for coronary artery disease include male sex and dyslipidemia.   Lower Extremity Issue   Chronicity: R ankle and L heel hurt. The current episode started more than 1 month ago. The problem occurs daily. The problem has been gradually worsening. Associated symptoms include arthralgias. Pertinent negatives include no abdominal pain, chills, congestion, coughing, fatigue, fever, myalgias or sore throat. The symptoms are aggravated by walking and standing. He has tried acetaminophen and NSAIDs for the symptoms. The treatment provided mild relief.        The following portions of the patient's history were reviewed and updated as appropriate: allergies, current medications, past family history, past medical history, past social history, past surgical history and problem list.    Review of Systems   Constitutional: Negative for chills, fatigue and fever.   HENT: Negative for congestion, postnasal drip and sore throat.    Eyes: Negative for itching and visual disturbance.   Respiratory: Negative for cough, shortness of breath and wheezing.    Cardiovascular: Negative for palpitations and leg swelling.   Gastrointestinal: Negative for abdominal pain, blood in stool, constipation and diarrhea.   Endocrine:  Negative.    Genitourinary: Negative.  Negative for difficulty urinating, dysuria and scrotal swelling.   Musculoskeletal: Positive for arthralgias and back pain. Negative for myalgias.   Skin: Negative for wound.        Areas where skin cancers frozen have healed.   Allergic/Immunologic: Negative.    Neurological: Negative.    Hematological: Does not bruise/bleed easily.   Psychiatric/Behavioral: Negative.  Negative for agitation, dysphoric mood and sleep disturbance.       Objective   Physical Exam   Constitutional: He is oriented to person, place, and time. He appears well-developed and well-nourished. No distress.   HENT:   Head: Normocephalic and atraumatic.   Right Ear: External ear normal.   Left Ear: External ear normal.   Nose: Nose normal.   Mouth/Throat: Oropharynx is clear and moist. No oropharyngeal exudate.        Eyes: Conjunctivae and EOM are normal. Pupils are equal, round, and reactive to light. Right eye exhibits no discharge. Left eye exhibits no discharge. No scleral icterus.   Neck: Neck supple. No JVD present. No tracheal deviation present. No thyromegaly present.   Has Full cervical ROM, WB 2.   Cardiovascular: Normal rate, regular rhythm, normal heart sounds and intact distal pulses. Exam reveals no gallop and no friction rub.   No murmur heard.  Pulmonary/Chest: Effort normal and breath sounds normal. No stridor. No respiratory distress. He has no wheezes. He has no rales. He exhibits no tenderness.   Abdominal: Soft. Bowel sounds are normal. He exhibits no distension and no mass. There is no tenderness. There is no rebound and no guarding. No hernia.   Musculoskeletal: He exhibits no edema, tenderness or deformity.   WB 4 with ROM back.   Lymphadenopathy:     He has no cervical adenopathy.   Neurological: He is alert and oriented to person, place, and time. He has normal reflexes. He displays normal reflexes. No cranial nerve deficit. He exhibits normal muscle tone. Coordination normal.    Skin: Skin is warm and dry. No rash noted. He is not diaphoretic. No erythema. No pallor.   Psychiatric: He has a normal mood and affect. His behavior is normal. Judgment and thought content normal.   Nursing note and vitals reviewed.      Assessment/Plan   Yehuda was seen today for copd, hyperlipidemia and foot pain.    Diagnoses and all orders for this visit:    Foot pain, bilateral  -     XR Foot 3+ View Right (In Office)  -     XR Foot 3+ View Bilateral (In Office)  -     CBC & Differential; Future  -     Comprehensive metabolic panel; Future  -     Lipid Panel; Future  -     Urinalysis With Culture If Indicated - Urine, Clean Catch; Future  -     Ambulatory Referral to Podiatry    Routine medical exam  -     CBC & Differential; Future  -     Comprehensive metabolic panel; Future  -     Lipid Panel; Future  -     Urinalysis With Culture If Indicated - Urine, Clean Catch; Future    Overweight (BMI 25.0-29.9)  -     CBC & Differential; Future  -     Comprehensive metabolic panel; Future  -     Lipid Panel; Future  -     Urinalysis With Culture If Indicated - Urine, Clean Catch; Future    Primary insomnia    Hyperlipidemia, unspecified hyperlipidemia type    Chronic obstructive pulmonary disease, unspecified COPD type (CMS/[facility])    High risk medication use    Arthritis    Other orders  -     gabapentin (NEURONTIN) 100 MG capsule; Take 1 capsule by mouth 2 (Two) Times a Day.      Discussed exam, bilateral foot problems, will check x-rays, refer to Podiatry. Discussed health problems, meds, indications, safety with controlled meds, discussed USPSTF recommendations.  Discussed F/U plan.          This document has been electronically signed by Buzz Sifuentes MD

## 2019-02-22 ENCOUNTER — TELEPHONE (OUTPATIENT)
Dept: FAMILY MEDICINE CLINIC | Facility: CLINIC | Age: 69
End: 2019-02-22

## 2019-02-22 NOTE — TELEPHONE ENCOUNTER
----- Message from Buzz Sifuentes MD sent at 2/20/2019  8:20 PM CST -----  Both feet have arthritic changes.

## 2019-03-08 ENCOUNTER — OFFICE VISIT (OUTPATIENT)
Dept: PODIATRY | Facility: CLINIC | Age: 69
End: 2019-03-08

## 2019-03-08 VITALS — WEIGHT: 176 LBS | OXYGEN SATURATION: 98 % | HEART RATE: 97 BPM | BODY MASS INDEX: 28.28 KG/M2 | HEIGHT: 66 IN

## 2019-03-08 DIAGNOSIS — M79.672 PAIN OF LEFT HEEL: Primary | ICD-10-CM

## 2019-03-08 DIAGNOSIS — M76.61 ACHILLES TENDINITIS OF RIGHT LOWER EXTREMITY: ICD-10-CM

## 2019-03-08 DIAGNOSIS — M72.2 PLANTAR FASCIITIS: ICD-10-CM

## 2019-03-08 PROCEDURE — 20550 NJX 1 TENDON SHEATH/LIGAMENT: CPT | Performed by: PODIATRIST

## 2019-03-08 PROCEDURE — 99203 OFFICE O/P NEW LOW 30 MIN: CPT | Performed by: PODIATRIST

## 2019-03-08 RX ORDER — DEXAMETHASONE SODIUM PHOSPHATE 4 MG/ML
2 INJECTION, SOLUTION INTRA-ARTICULAR; INTRALESIONAL; INTRAMUSCULAR; INTRAVENOUS; SOFT TISSUE ONCE
Status: COMPLETED | OUTPATIENT
Start: 2019-03-08 | End: 2019-03-08

## 2019-03-08 RX ORDER — BUPIVACAINE HYDROCHLORIDE 5 MG/ML
1 INJECTION, SOLUTION EPIDURAL; INTRACAUDAL ONCE
Status: COMPLETED | OUTPATIENT
Start: 2019-03-08 | End: 2019-03-08

## 2019-03-08 RX ORDER — TRIAMCINOLONE ACETONIDE 40 MG/ML
10 INJECTION, SUSPENSION INTRA-ARTICULAR; INTRAMUSCULAR ONCE
Status: COMPLETED | OUTPATIENT
Start: 2019-03-08 | End: 2019-03-08

## 2019-03-08 RX ADMIN — BUPIVACAINE HYDROCHLORIDE 1 ML: 5 INJECTION, SOLUTION EPIDURAL; INTRACAUDAL at 09:19

## 2019-03-08 RX ADMIN — TRIAMCINOLONE ACETONIDE 10 MG: 40 INJECTION, SUSPENSION INTRA-ARTICULAR; INTRAMUSCULAR at 09:20

## 2019-03-08 RX ADMIN — DEXAMETHASONE SODIUM PHOSPHATE 2 MG: 4 INJECTION, SOLUTION INTRA-ARTICULAR; INTRALESIONAL; INTRAMUSCULAR; INTRAVENOUS; SOFT TISSUE at 09:20
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
## 2019-03-27 ENCOUNTER — OFFICE VISIT (OUTPATIENT)
Dept: FAMILY MEDICINE CLINIC | Facility: CLINIC | Age: 69
End: 2019-03-27

## 2019-03-27 VITALS
HEART RATE: 75 BPM | SYSTOLIC BLOOD PRESSURE: 128 MMHG | HEIGHT: 66 IN | TEMPERATURE: 97.4 F | OXYGEN SATURATION: 96 % | WEIGHT: 176.6 LBS | DIASTOLIC BLOOD PRESSURE: 78 MMHG | BODY MASS INDEX: 28.38 KG/M2

## 2019-03-27 DIAGNOSIS — Z00.00 MEDICARE ANNUAL WELLNESS VISIT, SUBSEQUENT: Primary | ICD-10-CM

## 2019-03-27 PROCEDURE — G0439 PPPS, SUBSEQ VISIT: HCPCS | Performed by: FAMILY MEDICINE

## 2019-03-27 NOTE — PROGRESS NOTES
Subsequent Medicare Wellness Visit   The ABC's of the Annual Wellness Visit    Chief Complaint   Patient presents with   • Annual Exam     Medicare Wellness Exam       HPI:  Yehuda Rutledge, -1950, is a 68 y.o. male who presents for a Subsequent Medicare Wellness Visit.    Recent Hospitalizations:  No hospitalization(s) within the last year..    Current Medical Providers:  Patient Care Team:  Buzz Sifuentes MD as PCP - General  Buzz Sifuentes MD as PCP - Claims Attributed  Sincere Santamaria OD (Optometry)    Health Habits and Functional and Cognitive Screening and Depression Screening:  Functional & Cognitive Status 3/27/2019   Do you have difficulty preparing food and eating? No   Do you have difficulty bathing yourself, getting dressed or grooming yourself? No   Do you have difficulty using the toilet? No   Do you have difficulty moving around from place to place? No   Do you have trouble with steps or getting out of a bed or a chair? No   In the past year have you fallen or experienced a near fall? No   Current Diet Well Balanced Diet   Dental Exam Not up to date   Eye Exam Up to date   Exercise (times per week) 7 times per week   Current Exercise Activities Include Walking   Do you need help using the phone?  No   Are you deaf or do you have serious difficulty hearing?  No   Do you need help with transportation? No   Do you need help shopping? No   Do you need help preparing meals?  No   Do you need help with housework?  No   Do you need help with laundry? No   Do you need help taking your medications? No   Do you need help managing money? No   Do you ever drive or ride in a car without wearing a seat belt? No   Have you felt unusual stress, anger or loneliness in the last month? No   Who do you live with? Alone   If you need help, do you have trouble finding someone available to you? No   Have you been bothered in the last four weeks by sexual problems? No   Do you have difficulty  concentrating, remembering or making decisions? No       Compared to one year ago, the patient feels his physical health is better and his mental health is better.    Depression Screen:  PHQ-2/PHQ-9 Depression Screening 3/27/2019   Little interest or pleasure in doing things 0   Feeling down, depressed, or hopeless 0   Total Score 0         Past Medical/Family/Social History:  The following portions of the patient's history were reviewed and updated as appropriate: allergies, current medications, past family history, past medical history, past social history, past surgical history and problem list.    Allergies   Allergen Reactions   • Penicillins Rash         Current Outpatient Medications:   •  albuterol (PROVENTIL HFA;VENTOLIN HFA) 108 (90 BASE) MCG/ACT inhaler, Inhale 2 puffs Every 4 (Four) Hours As Needed. 2 puffs by  Mouth every 4-6 hours  , Disp: , Rfl:   •  aspirin 81 MG EC tablet, Take 81 mg by mouth daily., Disp: , Rfl:   •  cyclobenzaprine (FLEXERIL) 10 MG tablet, Take 10 mg by mouth 2 (two) times a day as needed for muscle spasms., Disp: , Rfl:   •  Diphenhydramine-Phenylephrine (QC ALLERGY/SINUS-D PO), Take 1 tablet by mouth Daily., Disp: , Rfl:   •  fluticasone (FLONASE) 50 MCG/ACT nasal spray, 2 sprays into each nostril Daily As Needed. Administer 2 sprays in each nostril for each dose. , Disp: , Rfl:   •  gabapentin (NEURONTIN) 100 MG capsule, Take 1 capsule by mouth 2 (Two) Times a Day., Disp: 60 capsule, Rfl: 3  •  guaifenesin-dextromethorphan (MUCINEX DM)  MG tablet sustained-release 12 hour tablet, Take 1 tablet by mouth 2 (Two) Times a Day., Disp: 30 tablet, Rfl: 0  •  Misc Natural Products (OSTEO BI-FLEX JOINT SHIELD PO), Take 1 tablet by mouth Daily., Disp: , Rfl:   •  Misc Natural Products (PROSTATE HEALTH) capsule, Take 1 capsule by mouth 2 (Two) Times a Day., Disp: , Rfl:   •  Multiple Vitamins-Minerals (MULTIVITAMIN ADULT PO), Take 1 tablet by mouth Daily., Disp: , Rfl:   •  Omega-3  Fatty Acids (FISH OIL) 1000 MG capsule capsule, Take 1,000 mg by mouth Daily With Breakfast., Disp: , Rfl:   •  pravastatin (PRAVACHOL) 20 MG tablet, Take 1 tablet by mouth Every Night., Disp: 90 tablet, Rfl: 1    Aspirin use counseling: Taking ASA appropriately as indicated    Current medication list contains high risk medications. Some potential harmful drug interactions identified. Plan of action: Continue with cautious use    Family History   Problem Relation Age of Onset   • Diabetes Mother        Social History     Tobacco Use   • Smoking status: Former Smoker     Packs/day: 1.00     Years: 40.00     Pack years: 40.00     Types: Cigarettes     Last attempt to quit: 2017     Years since quittin.3   • Smokeless tobacco: Never Used   Substance Use Topics   • Alcohol use: No       Past Surgical History:   Procedure Laterality Date   • ABDOMINAL SURGERY     • BACK SURGERY     • COLONOSCOPY  2015    Diverticulosis found in the sigmoid colon.Hemorrhoids found in the anus   • HERNIA REPAIR Right    • INCISION AND DRAINAGE ABSCESS  2015   • INJECTION OF MEDICATION  2016    Kenalog (7)   • INJECTION OF MEDICATION  10/09/2013    rocephen (3)   • INJECTION OF MEDICATION  10/09/2013    Xopenex (1)   • VENTRAL/INCISIONAL HERNIA REPAIR N/A 2018    Procedure: LAPAROSCOPIC INCISIONAL HERNIA REPAIR ;  Surgeon: Thanh Jarquin MD;  Location: [facility];  Service:        Patient Active Problem List   Diagnosis   • Simple chronic bronchitis (CMS/HCC)   • Environmental allergies   • Right-sided low back pain with right-sided sciatica   • Overweight (BMI 25.0-29.9)   • Chronic obstructive lung disease (CMS/HCC)   • Adenopathy, cervical   • High risk medication use   • Umbilical hernia without obstruction and without gangrene   • Chronic cervical radiculopathy   • Incisional hernia, without obstruction or gangrene   • Diabetes mellitus screening   • Hyperlipidemia   • Hyperglycemia   • Polyuria  "  • H/O umbilical hernia repair   • Former smoker   • Acute exacerbation of chronic obstructive airways disease (CMS/HCC)   • Lower respiratory tract infection   • Arthritis   • Actinic keratosis   • Routine medical exam   • Foot pain, bilateral   • Insomnia       Review of Systems    Objective     Vitals:    03/27/19 0901   BP: 128/78   BP Location: Right arm   Patient Position: Sitting   Cuff Size: Adult   Pulse: 75   Temp: 97.4 °F (36.3 °C)   TempSrc: Oral   SpO2: 96%   Weight: 80.1 kg (176 lb 9.6 oz)   Height: 167.6 cm (66\")   PainSc:   2   PainLoc: Arm       Patient's Body mass index is 28.5 kg/m². BMI is above normal parameters. Recommendations include: educational material, exercise counseling, nutrition counseling and referral to primary care.      Vision Screening Comments: Up to date with Dr Santamaria    The patient has no evidence of cognitve impairment.     Physical Exam    Recent Lab Results:     Lab Results   Component Value Date    CHOL 194 02/10/2017    TRIG 356 (H) 02/10/2017    HDL 34 (L) 02/10/2017    LDLHDL 2.61 02/10/2017       Assessment/Plan   Age-appropriate Screening Schedule:  Refer to the list below for future screening recommendations based on patient's age, sex and/or medical conditions.      Health Maintenance   Topic Date Due   • LIPID PANEL  02/10/2018   • ZOSTER VACCINE (2 of 2) 03/31/2020 (Originally 5/16/2018)   • TDAP/TD VACCINES (1 - Tdap) 07/31/2025 (Originally 8/1/2015)   • PNEUMOCOCCAL VACCINES (65+ LOW/MEDIUM RISK) (2 of 2 - PPSV23) 05/07/2019   • COLONOSCOPY  12/18/2025   • INFLUENZA VACCINE  Addressed       Medicare Risks and Personalized Health Plan:  Cardiovascular risk;  Patient advised to follow heart healthy diet to help decrease cardiovascular risk   Lung Cancer risk elevated;   Patient advised to make appointment for low dose lung cancer screening  Patient reminded to keep yearly schedule for low dose CT lung cancer screening  Obesity/Overweight condition;  Diet " information included in the after visit summary (AVS) and Exercise prescription included in the after visit summary (AVS)  Chronic Pain Uncontrolled; Referral to pain management      CMS-Preventive Services Quick Reference  Medicare Preventive Services Addressed:  Annual Wellness Visit (AWV)    Advance Care Planning:  Patient does not have an advance directive - information provided to the patient today    Diagnoses and all orders for this visit:    1. Medicare annual wellness visit, subsequent (Primary)        An After Visit Summary and PPPS with all of these plans were given to the patient.      Follow Up:  Return in about 1 year (around 3/27/2020) for Medicare Wellness subsequent.      Pt has drafted an Action Plan for improved health in coming year please see scanned documents.          This document has been electronically signed by Buzz Sifuentes MD on March 27, 2019

## 2019-03-27 NOTE — PATIENT INSTRUCTIONS
Fall Prevention in the Home, Adult  Falls can cause injuries and can affect people from all age groups. There are many simple things that you can do to make your home safe and to help prevent falls. Ask for help when making these changes, if needed.  What actions can I take to prevent falls?  General instructions  · Use good lighting in all rooms. Replace any light bulbs that burn out.  · Turn on lights if it is dark. Use night-lights.  · Place frequently used items in easy-to-reach places. Lower the shelves around your home if necessary.  · Set up furniture so that there are clear paths around it. Avoid moving your furniture around.  · Remove throw rugs and other tripping hazards from the floor.  · Avoid walking on wet floors.  · Fix any uneven floor surfaces.  · Add color or contrast paint or tape to grab bars and handrails in your home. Place contrasting color strips on the first and last steps of stairways.  · When you use a stepladder, make sure that it is completely opened and that the sides are firmly locked. Have someone hold the ladder while you are using it. Do not climb a closed stepladder.  · Be aware of any and all pets.  What can I do in the bathroom?  · Keep the floor dry. Immediately clean up any water that spills onto the floor.  · Remove soap buildup in the tub or shower on a regular basis.  · Use non-skid mats or decals on the floor of the tub or shower.  · Attach bath mats securely with double-sided, non-slip rug tape.  · If you need to sit down while you are in the shower, use a plastic, non-slip stool.  · Install grab bars by the toilet and in the tub and shower. Do not use towel bars as grab bars.  What can I do in the bedroom?  · Make sure that a bedside light is easy to reach.  · Do not use oversized bedding that drapes onto the floor.  · Have a firm chair that has side arms to use for getting dressed.  What can I do in the kitchen?  · Clean up any spills right away.  · If you need to reach  for something above you, use a sturdy step stool that has a grab bar.  · Keep electrical cables out of the way.  · Do not use floor polish or wax that makes floors slippery. If you must use wax, make sure that it is non-skid floor wax.  What can I do in the stairways?  · Do not leave any items on the stairs.  · Make sure that you have a light switch at the top of the stairs and the bottom of the stairs. Have them installed if you do not have them.  · Make sure that there are handrails on both sides of the stairs. Fix handrails that are broken or loose. Make sure that handrails are as long as the stairways.  · Install non-slip stair treads on all stairs in your home.  · Avoid having throw rugs at the top or bottom of stairways, or secure the rugs with carpet tape to prevent them from moving.  · Choose a carpet design that does not hide the edge of steps on the stairway.  · Check any carpeting to make sure that it is firmly attached to the stairs. Fix any carpet that is loose or worn.  What can I do on the outside of my home?  · Use bright outdoor lighting.  · Regularly repair the edges of walkways and driveways and fix any cracks.  · Remove high doorway thresholds.  · Trim any shrubbery on the main path into your home.  · Regularly check that handrails are securely fastened and in good repair. Both sides of any steps should have handrails.  · Install guardrails along the edges of any raised decks or porches.  · Clear walkways of debris and clutter, including tools and rocks.  · Have leaves, snow, and ice cleared regularly.  · Use sand or salt on walkways during winter months.  · In the garage, clean up any spills right away, including grease or oil spills.  What other actions can I take?  · Wear closed-toe shoes that fit well and support your feet. Wear shoes that have rubber soles or low heels.  · Use mobility aids as needed, such as canes, walkers, scooters, and crutches.  · Review your medicines with your health  care provider. Some medicines can cause dizziness or changes in blood pressure, which increase your risk of falling.  Talk with your health care provider about other ways that you can decrease your risk of falls. This may include working with a physical therapist or  to improve your strength, balance, and endurance.  Where to find more information  · Centers for Disease Control and Prevention, STEAD[employer]: https://www.cdc.gov  · National Hope on Aging: https://yv2twlo.miles.nih.gov  Contact a health care provider if:  · You are afraid of falling at home.  · You feel weak, drowsy, or dizzy at home.  · You fall at home.  Summary  · There are many simple things that you can do to make your home safe and to help prevent falls.  · Ways to make your home safe include removing tripping hazards and installing grab bars in the bathroom.  · Ask for help when making these changes in your home.  This information is not intended to replace advice given to you by your health care provider. Make sure you discuss any questions you have with your health care provider.  Document Released: 12/08/2003 Document Revised: 08/02/2018 Document Reviewed: 08/02/2018  [employer] Interactive Patient Education © 2019 [employer]  Exercising to Stay Healthy  Exercising regularly is important. It has many health benefits, such as:  · Improving your overall fitness, flexibility, and endurance.  · Increasing your bone density.  · Helping with weight control.  · Decreasing your body fat.  · Increasing your muscle strength.  · Reducing stress and tension.  · Improving your overall health.    In order to become healthy and stay healthy, it is recommended that you do moderate-intensity and vigorous-intensity exercise. You can tell that you are exercising at a moderate intensity if you have a higher heart rate and faster breathing, but you are still able to hold a conversation. You can tell that you are exercising at a vigorous intensity if you are  breathing much harder and faster and cannot hold a conversation while exercising.  How often should I exercise?  Choose an activity that you enjoy and set realistic goals. Your health care provider can help you to make an activity plan that works for you. Exercise regularly as directed by your health care provider. This may include:  · Doing resistance training twice each week, such as:  ? Push-ups.  ? Sit-ups.  ? Lifting weights.  ? Using resistance bands.  · Doing a given intensity of exercise for a given amount of time. Choose from these options:  ? 150 minutes of moderate-intensity exercise every week.  ? 75 minutes of vigorous-intensity exercise every week.  ? A mix of moderate-intensity and vigorous-intensity exercise every week.    Children, pregnant women, people who are out of shape, people who are overweight, and older adults may need to consult a health care provider for individual recommendations. If you have any sort of medical condition, be sure to consult your health care provider before starting a new exercise program.  What are some exercise ideas?  Some moderate-intensity exercise ideas include:  · Walking at a rate of 1 mile in 15 minutes.  · Biking.  · Hiking.  · Golfing.  · Dancing.    Some vigorous-intensity exercise ideas include:  · Walking at a rate of at least 4.5 miles per hour.  · Jogging or running at a rate of 5 miles per hour.  · Biking at a rate of at least 10 miles per hour.  · Lap swimming.  · Roller-skating or in-line skating.  · Cross-country skiing.  · Vigorous competitive sports, such as football, basketball, and soccer.  · Jumping rope.  · Aerobic dancing.    What are some everyday activities that can help me to get exercise?  · Yard work, such as:  ? Pushing a .  ? Raking and bagging leaves.  · Washing and waxing your car.  · Pushing a stroller.  · Shoveling snow.  · Gardening.  · Washing windows or floors.  How can I be more active in my day-to-day  activities?  · Use the stairs instead of the elevator.  · Take a walk during your lunch break.  · If you drive, park your car farther away from work or school.  · If you take public transportation, get off one stop early and walk the rest of the way.  · Make all of your phone calls while standing up and walking around.  · Get up, stretch, and walk around every 30 minutes throughout the day.  What guidelines should I follow while exercising?  · Do not exercise so much that you hurt yourself, feel dizzy, or get very short of breath.  · Consult your health care provider before starting a new exercise program.  · Wear comfortable clothes and shoes with good support.  · Drink plenty of water while you exercise to prevent dehydration or heat stroke. Body water is lost during exercise and must be replaced.  · Work out until you breathe faster and your heart beats faster.  This information is not intended to replace advice given to you by your health care provider. Make sure you discuss any questions you have with your health care provider.  Document Released: 01/20/2012 Document Revised: 05/25/2017 Document Reviewed: 05/21/2015  [employer] Interactive Patient Education © 2018 [employer]  Preventive Care 65 Years and Older, Male  Preventive care refers to lifestyle choices and visits with your health care provider that can promote health and wellness.  What does preventive care include?  · A yearly physical exam. This is also called an annual well check.  · Dental exams once or twice a year.  · Routine eye exams. Ask your health care provider how often you should have your eyes checked.  · Personal lifestyle choices, including:  ? Daily care of your teeth and gums.  ? Regular physical activity.  ? Eating a healthy diet.  ? Avoiding tobacco and drug use.  ? Limiting alcohol use.  ? Practicing safe sex.  ? Taking low doses of aspirin every day.  ? Taking vitamin and mineral supplements as recommended by your health care  provider.  What happens during an annual well check?  The services and screenings done by your health care provider during your annual well check will depend on your age, overall health, lifestyle risk factors, and family history of disease.  Counseling  Your health care provider may ask you questions about your:  · Alcohol use.  · Tobacco use.  · Drug use.  · Emotional well-being.  · Home and relationship well-being.  · Sexual activity.  · Eating habits.  · History of falls.  · Memory and ability to understand (cognition).  · Work and work environment.    Screening  You may have the following tests or measurements:  · Height, weight, and BMI.  · Blood pressure.  · Lipid and cholesterol levels. These may be checked every 5 years, or more frequently if you are over 50 years old.  · Skin check.  · Lung cancer screening. You may have this screening every year starting at age 55 if you have a 30-pack-year history of smoking and currently smoke or have quit within the past 15 years.  · Fecal occult blood test (FOBT) of the stool. You may have this test every year starting at age 50.  · Flexible sigmoidoscopy or colonoscopy. You may have a sigmoidoscopy every 5 years or a colonoscopy every 10 years starting at age 50.  · Prostate cancer screening. Recommendations will vary depending on your family history and other risks.  · Hepatitis C blood test.  · Hepatitis B blood test.  · Sexually transmitted disease (STD) testing.  · Diabetes screening. This is done by checking your blood sugar (glucose) after you have not eaten for a while (fasting). You may have this done every 1-3 years.  · Abdominal aortic aneurysm (AAA) screening. You may need this if you are a current or former smoker.  · Osteoporosis. You may be screened starting at age 70 if you are at high risk.    Talk with your health care provider about your test results, treatment options, and if necessary, the need for more tests.  Vaccines  Your health care provider  may recommend certain vaccines, such as:  · Influenza vaccine. This is recommended every year.  · Tetanus, diphtheria, and acellular pertussis (Tdap, Td) vaccine. You may need a Td booster every 10 years.  · Varicella vaccine. You may need this if you have not been vaccinated.  · Zoster vaccine. You may need this after age 60.  · Measles, mumps, and rubella (MMR) vaccine. You may need at least one dose of MMR if you were born in 1957 or later. You may also need a second dose.  · Pneumococcal 13-valent conjugate (PCV13) vaccine. One dose is recommended after age 65.  · Pneumococcal polysaccharide (PPSV23) vaccine. One dose is recommended after age 65.  · Meningococcal vaccine. You may need this if you have certain conditions.  · Hepatitis A vaccine. You may need this if you have certain conditions or if you travel or work in places where you may be exposed to hepatitis A.  · Hepatitis B vaccine. You may need this if you have certain conditions or if you travel or work in places where you may be exposed to hepatitis B.  · Haemophilus influenzae type b (Hib) vaccine. You may need this if you have certain risk factors.    Talk to your health care provider about which screenings and vaccines you need and how often you need them.  This information is not intended to replace advice given to you by your health care provider. Make sure you discuss any questions you have with your health care provider.  Document Released: 2017 Document Revised: 2018 Document Reviewed: 10/18/2016  [employer] Interactive Patient Education © 2019 [employer]    Medicare Wellness  Personal Prevention Plan of Service     Date of Office Visit:  2019  Encounter Provider:  Buzz Sifuentes MD  Place of Service:  [facility]  Patient Name: Yehuda Rutledge  :  1950    As part of the Medicare Wellness portion of your visit today, we are providing you with this personalized  preventive plan of services (PPPS). This plan is based upon recommendations of the [place] Preventive Services Task Force (USPSTF) and the Advisory Committee on Immunization Practices (ACIP).    This lists the preventive care services that should be considered, and provides dates of when you are due. Items listed as completed are up-to-date and do not require any further intervention.    Health Maintenance   Topic Date Due   • LIPID PANEL  02/10/2018   • ZOSTER VACCINE (2 of 2) 05/16/2018   • INFLUENZA VACCINE  08/01/2018   • MEDICARE ANNUAL WELLNESS  03/21/2019   • LUNG CANCER SCREENING  03/21/2019   • TDAP/TD VACCINES (1 - Tdap) 07/31/2025 (Originally 8/1/2015)   • PNEUMOCOCCAL VACCINES (65+ LOW/MEDIUM RISK) (2 of 2 - PPSV23) 05/07/2019   • COLONOSCOPY  12/18/2025   • HEPATITIS C SCREENING  Completed   • AAA SCREEN (ONE-TIME)  Completed       No orders of the defined types were placed in this encounter.      Return in about 1 year (around 3/27/2020) for Medicare Wellness subsequent.

## 2019-04-12 ENCOUNTER — TRANSCRIBE ORDERS (OUTPATIENT)
Dept: PODIATRY | Facility: CLINIC | Age: 69
End: 2019-04-12

## 2019-04-12 ENCOUNTER — OFFICE VISIT (OUTPATIENT)
Dept: PODIATRY | Facility: CLINIC | Age: 69
End: 2019-04-12

## 2019-04-12 VITALS — HEIGHT: 66 IN | BODY MASS INDEX: 28.28 KG/M2 | WEIGHT: 176 LBS | HEART RATE: 79 BPM | OXYGEN SATURATION: 98 %

## 2019-04-12 DIAGNOSIS — M72.2 PLANTAR FASCIITIS: Primary | ICD-10-CM

## 2019-04-12 PROCEDURE — 99214 OFFICE O/P EST MOD 30 MIN: CPT | Performed by: PODIATRIST

## 2019-04-12 RX ORDER — METHYLPREDNISOLONE 4 MG/1
TABLET ORAL
Qty: 21 TABLET | Refills: 0 | Status: SHIPPED | OUTPATIENT
Start: 2019-04-12 | End: 2019-05-23
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
## 2019-04-22 ENCOUNTER — HOSPITAL ENCOUNTER (OUTPATIENT)
Dept: PHYSICAL THERAPY | Facility: HOSPITAL | Age: 69
Setting detail: THERAPIES SERIES
Discharge: HOME | End: 2019-04-22

## 2019-04-22 DIAGNOSIS — M72.2 PLANTAR FASCIITIS: ICD-10-CM

## 2019-04-22 DIAGNOSIS — M79.671 FOOT PAIN, BILATERAL: Primary | ICD-10-CM

## 2019-04-22 DIAGNOSIS — M79.672 FOOT PAIN, BILATERAL: Primary | ICD-10-CM

## 2019-04-22 PROCEDURE — 97161 PT EVAL LOW COMPLEX 20 MIN: CPT | Performed by: PHYSICAL THERAPIST

## 2019-04-22 PROCEDURE — 97110 THERAPEUTIC EXERCISES: CPT | Performed by: PHYSICAL THERAPIST
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
## 2019-04-25 ENCOUNTER — HOSPITAL ENCOUNTER (OUTPATIENT)
Dept: PHYSICAL THERAPY | Facility: HOSPITAL | Age: 69
Setting detail: THERAPIES SERIES
Discharge: HOME | End: 2019-04-25

## 2019-04-25 DIAGNOSIS — M79.671 FOOT PAIN, BILATERAL: Primary | ICD-10-CM

## 2019-04-25 DIAGNOSIS — M72.2 PLANTAR FASCIITIS: ICD-10-CM

## 2019-04-25 DIAGNOSIS — M79.672 FOOT PAIN, BILATERAL: Primary | ICD-10-CM

## 2019-04-25 PROCEDURE — 97110 THERAPEUTIC EXERCISES: CPT
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
## 2019-04-29 ENCOUNTER — HOSPITAL ENCOUNTER (OUTPATIENT)
Dept: PHYSICAL THERAPY | Facility: HOSPITAL | Age: 69
Setting detail: THERAPIES SERIES
Discharge: HOME | End: 2019-04-29

## 2019-04-29 DIAGNOSIS — M79.672 FOOT PAIN, BILATERAL: Primary | ICD-10-CM

## 2019-04-29 DIAGNOSIS — M79.671 FOOT PAIN, BILATERAL: Primary | ICD-10-CM

## 2019-04-29 DIAGNOSIS — M72.2 PLANTAR FASCIITIS: ICD-10-CM

## 2019-04-29 PROCEDURE — 97110 THERAPEUTIC EXERCISES: CPT
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
## 2019-05-02 ENCOUNTER — HOSPITAL ENCOUNTER (OUTPATIENT)
Dept: PHYSICAL THERAPY | Facility: HOSPITAL | Age: 69
Setting detail: THERAPIES SERIES
Discharge: HOME | End: 2019-05-02

## 2019-05-02 DIAGNOSIS — M72.2 PLANTAR FASCIITIS: ICD-10-CM

## 2019-05-02 DIAGNOSIS — M79.672 FOOT PAIN, BILATERAL: Primary | ICD-10-CM

## 2019-05-02 DIAGNOSIS — M79.671 FOOT PAIN, BILATERAL: Primary | ICD-10-CM

## 2019-05-02 PROCEDURE — 97110 THERAPEUTIC EXERCISES: CPT
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
## 2019-05-06 ENCOUNTER — HOSPITAL ENCOUNTER (OUTPATIENT)
Dept: PHYSICAL THERAPY | Facility: HOSPITAL | Age: 69
Setting detail: THERAPIES SERIES
Discharge: HOME | End: 2019-05-06

## 2019-05-06 DIAGNOSIS — M79.671 FOOT PAIN, BILATERAL: Primary | ICD-10-CM

## 2019-05-06 DIAGNOSIS — M79.672 FOOT PAIN, BILATERAL: Primary | ICD-10-CM

## 2019-05-06 DIAGNOSIS — M72.2 PLANTAR FASCIITIS: ICD-10-CM

## 2019-05-06 PROCEDURE — 97110 THERAPEUTIC EXERCISES: CPT

## 2019-05-06 PROCEDURE — 97140 MANUAL THERAPY 1/> REGIONS: CPT
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
## 2019-05-09 ENCOUNTER — HOSPITAL ENCOUNTER (OUTPATIENT)
Dept: PHYSICAL THERAPY | Facility: HOSPITAL | Age: 69
Setting detail: THERAPIES SERIES
Discharge: HOME | End: 2019-05-09

## 2019-05-09 DIAGNOSIS — M79.671 FOOT PAIN, BILATERAL: Primary | ICD-10-CM

## 2019-05-09 DIAGNOSIS — M72.2 PLANTAR FASCIITIS: ICD-10-CM

## 2019-05-09 DIAGNOSIS — M79.672 FOOT PAIN, BILATERAL: Primary | ICD-10-CM

## 2019-05-09 PROCEDURE — 97110 THERAPEUTIC EXERCISES: CPT
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
## 2019-05-13 ENCOUNTER — HOSPITAL ENCOUNTER (OUTPATIENT)
Dept: PHYSICAL THERAPY | Facility: HOSPITAL | Age: 69
Setting detail: THERAPIES SERIES
Discharge: HOME | End: 2019-05-13

## 2019-05-13 DIAGNOSIS — M79.672 FOOT PAIN, BILATERAL: Primary | ICD-10-CM

## 2019-05-13 DIAGNOSIS — M72.2 PLANTAR FASCIITIS: ICD-10-CM

## 2019-05-13 DIAGNOSIS — M79.671 FOOT PAIN, BILATERAL: Primary | ICD-10-CM

## 2019-05-13 PROCEDURE — 97110 THERAPEUTIC EXERCISES: CPT | Performed by: PHYSICAL THERAPIST
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
## 2019-05-16 ENCOUNTER — APPOINTMENT (OUTPATIENT)
Dept: PHYSICAL THERAPY | Facility: HOSPITAL | Age: 69
End: 2019-05-16

## 2019-05-23 ENCOUNTER — LAB (OUTPATIENT)
Dept: LAB | Facility: HOSPITAL | Age: 69
End: 2019-05-23

## 2019-05-23 ENCOUNTER — OFFICE VISIT (OUTPATIENT)
Dept: FAMILY MEDICINE CLINIC | Facility: CLINIC | Age: 69
End: 2019-05-23

## 2019-05-23 ENCOUNTER — HOSPITAL ENCOUNTER (OUTPATIENT)
Dept: PHYSICAL THERAPY | Facility: HOSPITAL | Age: 69
Setting detail: THERAPIES SERIES
Discharge: HOME | End: 2019-05-23

## 2019-05-23 VITALS
HEART RATE: 85 BPM | SYSTOLIC BLOOD PRESSURE: 152 MMHG | OXYGEN SATURATION: 96 % | TEMPERATURE: 98.2 F | BODY MASS INDEX: 28.53 KG/M2 | HEIGHT: 66 IN | DIASTOLIC BLOOD PRESSURE: 82 MMHG | WEIGHT: 177.5 LBS

## 2019-05-23 DIAGNOSIS — Z87.891 FORMER SMOKER: ICD-10-CM

## 2019-05-23 DIAGNOSIS — M79.671 BILATERAL FOOT PAIN: ICD-10-CM

## 2019-05-23 DIAGNOSIS — J30.2 SEASONAL ALLERGIES: ICD-10-CM

## 2019-05-23 DIAGNOSIS — M79.672 FOOT PAIN, BILATERAL: ICD-10-CM

## 2019-05-23 DIAGNOSIS — M54.41 CHRONIC RIGHT-SIDED LOW BACK PAIN WITH RIGHT-SIDED SCIATICA: ICD-10-CM

## 2019-05-23 DIAGNOSIS — E78.5 HYPERLIPIDEMIA, UNSPECIFIED HYPERLIPIDEMIA TYPE: ICD-10-CM

## 2019-05-23 DIAGNOSIS — M79.672 FOOT PAIN, BILATERAL: Primary | ICD-10-CM

## 2019-05-23 DIAGNOSIS — Z00.00 ROUTINE MEDICAL EXAM: ICD-10-CM

## 2019-05-23 DIAGNOSIS — M79.672 BILATERAL FOOT PAIN: ICD-10-CM

## 2019-05-23 DIAGNOSIS — M72.2 PLANTAR FASCIITIS: ICD-10-CM

## 2019-05-23 DIAGNOSIS — J44.9 CHRONIC OBSTRUCTIVE PULMONARY DISEASE, UNSPECIFIED COPD TYPE (HCC): ICD-10-CM

## 2019-05-23 DIAGNOSIS — M79.671 FOOT PAIN, BILATERAL: ICD-10-CM

## 2019-05-23 DIAGNOSIS — M19.90 ARTHRITIS: ICD-10-CM

## 2019-05-23 DIAGNOSIS — Z79.899 HIGH RISK MEDICATION USE: ICD-10-CM

## 2019-05-23 DIAGNOSIS — M79.671 FOOT PAIN, BILATERAL: Primary | ICD-10-CM

## 2019-05-23 DIAGNOSIS — M54.12 CHRONIC CERVICAL RADICULOPATHY: ICD-10-CM

## 2019-05-23 DIAGNOSIS — Z82.49 FAMILY HISTORY OF CAROTID ARTERY STENOSIS: Primary | ICD-10-CM

## 2019-05-23 DIAGNOSIS — G89.29 CHRONIC RIGHT-SIDED LOW BACK PAIN WITH RIGHT-SIDED SCIATICA: ICD-10-CM

## 2019-05-23 DIAGNOSIS — E66.3 OVERWEIGHT (BMI 25.0-29.9): ICD-10-CM

## 2019-05-23 DIAGNOSIS — Z91.09 ENVIRONMENTAL ALLERGIES: ICD-10-CM

## 2019-05-23 LAB
ALBUMIN SERPL-MCNC: 4.4 G/DL (ref 3.5–5.2)
ALBUMIN/GLOB SERPL: 1.6 G/DL
ALP SERPL-CCNC: 76 U/L (ref 39–117)
ALT SERPL W P-5'-P-CCNC: 26 U/L (ref 1–41)
ANION GAP SERPL CALCULATED.3IONS-SCNC: 12.7 MMOL/L
ARTICHOKE IGE QN: 78 MG/DL (ref 0–100)
AST SERPL-CCNC: 23 U/L (ref 1–40)
BASOPHILS # BLD AUTO: 0.05 10*3/MM3 (ref 0–0.2)
BASOPHILS NFR BLD AUTO: 0.7 % (ref 0–1.5)
BILIRUB SERPL-MCNC: 0.2 MG/DL (ref 0.2–1.2)
BILIRUB UR QL STRIP: NEGATIVE
BUN BLD-MCNC: 9 MG/DL (ref 8–23)
BUN/CREAT SERPL: 9.4 (ref 7–25)
CALCIUM SPEC-SCNC: 9.3 MG/DL (ref 8.6–10.5)
CHLORIDE SERPL-SCNC: 97 MMOL/L (ref 98–107)
CHOLEST SERPL-MCNC: 222 MG/DL (ref 0–200)
CLARITY UR: CLEAR
CO2 SERPL-SCNC: 24.3 MMOL/L (ref 22–29)
COLOR UR: YELLOW
CREAT BLD-MCNC: 0.96 MG/DL (ref 0.76–1.27)
DEPRECATED RDW RBC AUTO: 43.8 FL (ref 37–54)
EOSINOPHIL # BLD AUTO: 0.08 10*3/MM3 (ref 0–0.4)
EOSINOPHIL NFR BLD AUTO: 1.1 % (ref 0.3–6.2)
ERYTHROCYTE [DISTWIDTH] IN BLOOD BY AUTOMATED COUNT: 13.7 % (ref 12.3–15.4)
GFR SERPL CREATININE-BSD FRML MDRD: 78 ML/MIN/1.73
GLOBULIN UR ELPH-MCNC: 2.8 GM/DL
GLUCOSE BLD-MCNC: 105 MG/DL (ref 65–99)
GLUCOSE UR STRIP-MCNC: NEGATIVE MG/DL
HCT VFR BLD AUTO: 39.8 % (ref 37.5–51)
HDLC SERPL-MCNC: 29 MG/DL (ref 40–60)
HGB BLD-MCNC: 12.8 G/DL (ref 13–17.7)
HGB UR QL STRIP.AUTO: NEGATIVE
IMM GRANULOCYTES # BLD AUTO: 0.03 10*3/MM3 (ref 0–0.05)
IMM GRANULOCYTES NFR BLD AUTO: 0.4 % (ref 0–0.5)
KETONES UR QL STRIP: NEGATIVE
LDLC SERPL CALC-MCNC: ABNORMAL MG/DL (ref 0–100)
LDLC/HDLC SERPL: ABNORMAL {RATIO}
LEUKOCYTE ESTERASE UR QL STRIP.AUTO: NEGATIVE
LYMPHOCYTES # BLD AUTO: 2.96 10*3/MM3 (ref 0.7–3.1)
LYMPHOCYTES NFR BLD AUTO: 42.2 % (ref 19.6–45.3)
MCH RBC QN AUTO: 28.3 PG (ref 26.6–33)
MCHC RBC AUTO-ENTMCNC: 32.2 G/DL (ref 31.5–35.7)
MCV RBC AUTO: 87.9 FL (ref 79–97)
MONOCYTES # BLD AUTO: 0.48 10*3/MM3 (ref 0.1–0.9)
MONOCYTES NFR BLD AUTO: 6.8 % (ref 5–12)
NEUTROPHILS # BLD AUTO: 3.42 10*3/MM3 (ref 1.7–7)
NEUTROPHILS NFR BLD AUTO: 48.8 % (ref 42.7–76)
NITRITE UR QL STRIP: NEGATIVE
NRBC BLD AUTO-RTO: 0 /100 WBC (ref 0–0.2)
PH UR STRIP.AUTO: 6.5 [PH] (ref 5–8)
PLATELET # BLD AUTO: 238 10*3/MM3 (ref 140–450)
PMV BLD AUTO: 10.9 FL (ref 6–12)
POTASSIUM BLD-SCNC: 4 MMOL/L (ref 3.5–5.2)
PROT SERPL-MCNC: 7.2 G/DL (ref 6–8.5)
PROT UR QL STRIP: NEGATIVE
RBC # BLD AUTO: 4.53 10*6/MM3 (ref 4.14–5.8)
SODIUM BLD-SCNC: 134 MMOL/L (ref 136–145)
SP GR UR STRIP: 1.01 (ref 1–1.03)
TRIGL SERPL-MCNC: 598 MG/DL (ref 0–150)
UROBILINOGEN UR QL STRIP: NORMAL
VLDLC SERPL-MCNC: ABNORMAL MG/DL (ref 5–40)
WBC NRBC COR # BLD: 7.02 10*3/MM3 (ref 3.4–10.8)

## 2019-05-23 PROCEDURE — 85025 COMPLETE CBC W/AUTO DIFF WBC: CPT

## 2019-05-23 PROCEDURE — 99214 OFFICE O/P EST MOD 30 MIN: CPT | Performed by: FAMILY MEDICINE

## 2019-05-23 PROCEDURE — 81003 URINALYSIS AUTO W/O SCOPE: CPT

## 2019-05-23 PROCEDURE — 80053 COMPREHEN METABOLIC PANEL: CPT

## 2019-05-23 PROCEDURE — 83721 ASSAY OF BLOOD LIPOPROTEIN: CPT

## 2019-05-23 PROCEDURE — 80061 LIPID PANEL: CPT

## 2019-05-23 RX ORDER — GABAPENTIN 100 MG/1
100 CAPSULE ORAL 2 TIMES DAILY
Qty: 60 CAPSULE | Refills: 3 | Status: SHIPPED | OUTPATIENT
Start: 2019-05-23 | End: 2019-08-22 | Stop reason: SDUPTHER

## 2019-05-23 RX ORDER — CETIRIZINE HYDROCHLORIDE 10 MG/1
10 TABLET ORAL DAILY
Qty: 30 TABLET | Refills: 5 | Status: SHIPPED | OUTPATIENT
Start: 2019-05-23 | End: 2021-07-16 | Stop reason: SDUPTHER

## 2019-05-23 RX ORDER — BROMPHENIRAMINE MALEATE, PSEUDOEPHEDRINE HYDROCHLORIDE, AND DEXTROMETHORPHAN HYDROBROMIDE 2; 30; 10 MG/5ML; MG/5ML; MG/5ML
5 SYRUP ORAL 3 TIMES DAILY PRN
Qty: 118 ML | Refills: 1 | Status: SHIPPED | OUTPATIENT
Start: 2019-05-23 | End: 2019-08-22 | Stop reason: SDUPTHER
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
## 2019-05-23 NOTE — PROGRESS NOTES
Subjective   . Yehuda Rutledge is a 68 y.o. overweight white male former smoker with COPD, Allergies, H/O Lumbar back surgery, Lumbago/Sciatica, H/O ETOH abuse in remission, Abd Hernia (S/P incisional hernia repair), and other health problems see PMH/PSH. Pt presents for exam, to discuss health problems, Tx and F/U plans.       ' Have been working in PT to improve pain in feet and ankles, will receive shoe Inserts today. Allergies, flaring, taking OTC meds with some relief. Recently Brother had Carotid blockage, would like to have carotid check to make sure are OK. Breathing has been OK. Neurontin helps with pain, takes daily prn, has tapered use.'        Back Pain   This is a chronic problem. The current episode started more than 1 month ago. The problem occurs daily. The problem has been gradually improving since onset. The pain is present in the lumbar spine (Right Lumbar pain radiating down R lat leg. ). The quality of the pain is described as aching, shooting and stabbing. The pain radiates to the right thigh, right knee and right foot. The pain is at a severity of 1/10. The pain is mild. The symptoms are aggravated by standing, twisting and position. Stiffness is present all day. Pertinent negatives include no abdominal pain, dysuria or fever. (Pain and numbness radiating down R outer leg) Risk factors include poor posture. He has tried analgesics, bed rest, heat, chiropractic manipulation, home exercises, muscle relaxant, NSAIDs, walking and ice for the symptoms. The treatment provided significant relief.   COPD   This is a chronic problem. The current episode started more than 1 year ago. The problem occurs daily. The problem has been unchanged. Associated symptoms include arthralgias and neck pain. Pertinent negatives include no abdominal pain, chills, congestion, coughing, fatigue, fever, myalgias, rash or sore throat. The symptoms are aggravated by coughing, exertion and smoking. The treatment provided  mild relief.   Neck Pain    This is a chronic problem. The current episode started more than 1 year ago. The problem occurs daily. The problem has been waxing and waning. The pain is associated with an unknown factor. The pain is at a severity of 4/10. The pain is moderate. The pain is worse during the night. Pertinent negatives include no fever. He has tried acetaminophen, NSAIDs, muscle relaxants, chiropractic manipulation, home exercises and heat (Neurontin) for the symptoms. The treatment provided moderate relief.   Hyperlipidemia   This is a chronic problem. The current episode started more than 1 year ago. The problem is controlled. Recent lipid tests were reviewed and are variable. Factors aggravating his hyperlipidemia include smoking. Pertinent negatives include no myalgias or shortness of breath. Current antihyperlipidemic treatment includes statins and diet change. The current treatment provides moderate improvement of lipids. Risk factors for coronary artery disease include male sex and dyslipidemia.   Lower Extremity Issue   Chronicity: R ankle and L heel hurt. The current episode started more than 1 month ago. The problem occurs daily. The problem has been gradually worsening. Associated symptoms include arthralgias and neck pain. Pertinent negatives include no abdominal pain, chills, congestion, coughing, fatigue, fever, myalgias, rash or sore throat. The symptoms are aggravated by walking and standing. He has tried acetaminophen and NSAIDs for the symptoms. The treatment provided mild relief.        The following portions of the patient's history were reviewed and updated as appropriate: allergies, current medications, past family history, past medical history, past social history, past surgical history and problem list.    Review of Systems   Constitutional: Negative for chills, fatigue and fever.   HENT: Negative for congestion, postnasal drip and sore throat.    Eyes: Negative for itching and  visual disturbance.   Respiratory: Negative for cough, shortness of breath and wheezing.    Cardiovascular: Negative for palpitations and leg swelling.   Gastrointestinal: Negative for abdominal pain, blood in stool, constipation and diarrhea.   Endocrine: Negative.    Genitourinary: Negative.  Negative for difficulty urinating, dysuria and scrotal swelling.   Musculoskeletal: Positive for arthralgias, back pain and neck pain. Negative for myalgias.   Skin: Negative for rash and wound.   Allergic/Immunologic: Negative.    Neurological: Negative.    Hematological: Does not bruise/bleed easily.   Psychiatric/Behavioral: Negative.  Negative for agitation, dysphoric mood and sleep disturbance.       Objective   Physical Exam   Constitutional: He is oriented to person, place, and time. He appears well-developed and well-nourished. No distress.   HENT:   Head: Normocephalic and atraumatic.   Right Ear: External ear normal.   Left Ear: External ear normal.   Nose: Nose normal.   Mouth/Throat: Oropharynx is clear and moist. No oropharyngeal exudate.        Eyes: Conjunctivae and EOM are normal. Pupils are equal, round, and reactive to light. Right eye exhibits no discharge. Left eye exhibits no discharge. No scleral icterus.   Neck: Neck supple. No JVD present. No tracheal deviation present. No thyromegaly present.   Has Full cervical ROM, WB 2.   Cardiovascular: Normal rate, regular rhythm, normal heart sounds and intact distal pulses. Exam reveals no gallop and no friction rub.   No murmur heard.  Pulmonary/Chest: Effort normal and breath sounds normal. No stridor. No respiratory distress. He has no wheezes. He has no rales. He exhibits no tenderness.   Abdominal: Soft. Bowel sounds are normal. He exhibits no distension and no mass. There is no tenderness. There is no rebound and no guarding. No hernia.   Musculoskeletal: He exhibits no edema, tenderness or deformity.   WB 4 with ROM back.   Lymphadenopathy:     He has no  cervical adenopathy.   Neurological: He is alert and oriented to person, place, and time. He has normal reflexes. He displays normal reflexes. No cranial nerve deficit. He exhibits normal muscle tone. Coordination normal.   Skin: Skin is warm and dry. No rash noted. He is not diaphoretic. No erythema. No pallor.   Psychiatric: He has a normal mood and affect. His behavior is normal. Judgment and thought content normal.   Nursing note and vitals reviewed.      Assessment/Plan   Yehuda was seen today for copd, neck pain, hyperlipidemia and sinusitis.    Diagnoses and all orders for this visit:    Family history of carotid artery stenosis    Former smoker    Hyperlipidemia, unspecified hyperlipidemia type    Seasonal allergies    Chronic obstructive pulmonary disease, unspecified COPD type (CMS/HCC)    Chronic cervical radiculopathy    Chronic right-sided low back pain with right-sided sciatica    Foot pain, bilateral    Environmental allergies    Arthritis    Bilateral foot pain    High risk medication use    Other orders  -     gabapentin (NEURONTIN) 100 MG capsule; Take 1 capsule by mouth 2 (Two) Times a Day.  -     cetirizine (zyrTEC) 10 MG tablet; Take 1 tablet by mouth Daily.  -     brompheniramine-pseudoephedrine-DM 30-2-10 MG/5ML syrup; Take 5 mL by mouth 3 (Three) Times a Day As Needed for Congestion, Cough or Allergies. May take as needed with Zyrtec as needed    Discussed exam, health problems, meds, indications, Tx plan, rationale. Discussed Tx for Allergies. Discussed Controlled meds, safety with med. Discussed foot pain, Tx plan. Discussed F/U with specialist, discussed F/U here.           This document has been electronically signed by Buzz Sifuentes MD on May 23, 2019

## 2019-05-24 ENCOUNTER — TELEPHONE (OUTPATIENT)
Dept: FAMILY MEDICINE CLINIC | Facility: CLINIC | Age: 69
End: 2019-05-24

## 2019-05-24 NOTE — TELEPHONE ENCOUNTER
----- Message from Buzz Sifuentes MD sent at 5/24/2019  8:37 AM CDT -----  Are stable from last year we will go over at follow-up

## 2019-06-07 ENCOUNTER — OFFICE VISIT (OUTPATIENT)
Dept: PODIATRY | Facility: CLINIC | Age: 69
End: 2019-06-07

## 2019-06-07 VITALS — WEIGHT: 177 LBS | HEIGHT: 66 IN | OXYGEN SATURATION: 98 % | BODY MASS INDEX: 28.45 KG/M2 | HEART RATE: 97 BPM

## 2019-06-07 DIAGNOSIS — M72.2 PLANTAR FASCIITIS: Primary | ICD-10-CM

## 2019-06-07 PROCEDURE — 99213 OFFICE O/P EST LOW 20 MIN: CPT | Performed by: PODIATRIST

## 2019-06-07 RX ORDER — MELOXICAM 15 MG/1
15 TABLET ORAL DAILY
Qty: 30 TABLET | Refills: 1 | Status: SHIPPED | OUTPATIENT
Start: 2019-06-07 | End: 2019-06-07 | Stop reason: SDUPTHER
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
## 2019-06-10 RX ORDER — MELOXICAM 15 MG/1
TABLET ORAL
Qty: 90 TABLET | Refills: 1 | Status: SHIPPED | OUTPATIENT
Start: 2019-06-10 | End: 2020-10-26

## 2019-08-22 ENCOUNTER — OFFICE VISIT (OUTPATIENT)
Dept: FAMILY MEDICINE CLINIC | Facility: CLINIC | Age: 69
End: 2019-08-22

## 2019-08-22 VITALS
WEIGHT: 181.5 LBS | SYSTOLIC BLOOD PRESSURE: 144 MMHG | HEIGHT: 66 IN | OXYGEN SATURATION: 98 % | HEART RATE: 82 BPM | DIASTOLIC BLOOD PRESSURE: 80 MMHG | TEMPERATURE: 98.5 F | BODY MASS INDEX: 29.17 KG/M2

## 2019-08-22 DIAGNOSIS — J44.9 CHRONIC OBSTRUCTIVE PULMONARY DISEASE, UNSPECIFIED COPD TYPE (HCC): ICD-10-CM

## 2019-08-22 DIAGNOSIS — M54.12 CHRONIC CERVICAL RADICULOPATHY: ICD-10-CM

## 2019-08-22 DIAGNOSIS — M54.41 CHRONIC BILATERAL LOW BACK PAIN WITH BILATERAL SCIATICA: ICD-10-CM

## 2019-08-22 DIAGNOSIS — J22 LOWER RESPIRATORY INFECTION: ICD-10-CM

## 2019-08-22 DIAGNOSIS — M54.42 CHRONIC BILATERAL LOW BACK PAIN WITH BILATERAL SCIATICA: ICD-10-CM

## 2019-08-22 DIAGNOSIS — G89.29 CHRONIC BILATERAL LOW BACK PAIN WITH BILATERAL SCIATICA: ICD-10-CM

## 2019-08-22 PROCEDURE — 99214 OFFICE O/P EST MOD 30 MIN: CPT | Performed by: FAMILY MEDICINE

## 2019-08-22 RX ORDER — BROMPHENIRAMINE MALEATE, PSEUDOEPHEDRINE HYDROCHLORIDE, AND DEXTROMETHORPHAN HYDROBROMIDE 2; 30; 10 MG/5ML; MG/5ML; MG/5ML
5 SYRUP ORAL 3 TIMES DAILY PRN
Qty: 118 ML | Refills: 1 | Status: SHIPPED | OUTPATIENT
Start: 2019-08-22 | End: 2020-07-07

## 2019-08-22 RX ORDER — GABAPENTIN 100 MG/1
100 CAPSULE ORAL 2 TIMES DAILY
Qty: 60 CAPSULE | Refills: 3 | Status: SHIPPED | OUTPATIENT
Start: 2019-08-22 | End: 2019-11-19 | Stop reason: SDUPTHER

## 2019-08-22 RX ORDER — AZITHROMYCIN 250 MG/1
TABLET, FILM COATED ORAL
Qty: 6 TABLET | Refills: 0 | Status: SHIPPED | OUTPATIENT
Start: 2019-08-22 | End: 2019-11-19

## 2019-08-25 NOTE — PATIENT INSTRUCTIONS
Preventive Care 65 Years and Older, Male  Preventive care refers to lifestyle choices and visits with your health care provider that can promote health and wellness.  What does preventive care include?    · A yearly physical exam. This is also called an annual well check.  · Dental exams once or twice a year.  · Routine eye exams. Ask your health care provider how often you should have your eyes checked.  · Personal lifestyle choices, including:  ? Daily care of your teeth and gums.  ? Regular physical activity.  ? Eating a healthy diet.  ? Avoiding tobacco and drug use.  ? Limiting alcohol use.  ? Practicing safe sex.  ? Taking low doses of aspirin every day.  ? Taking vitamin and mineral supplements as recommended by your health care provider.  What happens during an annual well check?  The services and screenings done by your health care provider during your annual well check will depend on your age, overall health, lifestyle risk factors, and family history of disease.  Counseling  Your health care provider may ask you questions about your:  · Alcohol use.  · Tobacco use.  · Drug use.  · Emotional well-being.  · Home and relationship well-being.  · Sexual activity.  · Eating habits.  · History of falls.  · Memory and ability to understand (cognition).  · Work and work environment.  Screening  You may have the following tests or measurements:  · Height, weight, and BMI.  · Blood pressure.  · Lipid and cholesterol levels. These may be checked every 5 years, or more frequently if you are over 50 years old.  · Skin check.  · Lung cancer screening. You may have this screening every year starting at age 55 if you have a 30-pack-year history of smoking and currently smoke or have quit within the past 15 years.  · Colorectal cancer screening. All adults should have this screening starting at age 50 and continuing until age 75. You will have tests every 1-10 years, depending on your results and the type of screening  test. People at increased risk should start screening at an earlier age. Screening tests may include:  ? Guaiac-based fecal occult blood testing.  ? Fecal immunochemical test (FIT).  ? Stool DNA test.  ? Virtual colonoscopy.  ? Sigmoidoscopy. During this test, a flexible tube with a tiny camera (sigmoidoscope) is used to examine your rectum and lower colon. The sigmoidoscope is inserted through your anus into your rectum and lower colon.  ? Colonoscopy. During this test, a long, thin, flexible tube with a tiny camera (colonoscope) is used to examine your entire colon and rectum.  · Prostate cancer screening. Recommendations will vary depending on your family history and other risks.  · Hepatitis C blood test.  · Hepatitis B blood test.  · Sexually transmitted disease (STD) testing.  · Diabetes screening. This is done by checking your blood sugar (glucose) after you have not eaten for a while (fasting). You may have this done every 1-3 years.  · Abdominal aortic aneurysm (AAA) screening. You may need this if you are a current or former smoker.  · Osteoporosis. You may be screened starting at age 70 if you are at high risk.  Talk with your health care provider about your test results, treatment options, and if necessary, the need for more tests.  Vaccines  Your health care provider may recommend certain vaccines, such as:  · Influenza vaccine. This is recommended every year.  · Tetanus, diphtheria, and acellular pertussis (Tdap, Td) vaccine. You may need a Td booster every 10 years.  · Varicella vaccine. You may need this if you have not been vaccinated.  · Zoster vaccine. You may need this after age 60.  · Measles, mumps, and rubella (MMR) vaccine. You may need at least one dose of MMR if you were born in 1957 or later. You may also need a second dose.  · Pneumococcal 13-valent conjugate (PCV13) vaccine. One dose is recommended after age 65.  · Pneumococcal polysaccharide (PPSV23) vaccine. One dose is recommended  after age 65.  · Meningococcal vaccine. You may need this if you have certain conditions.  · Hepatitis A vaccine. You may need this if you have certain conditions or if you travel or work in places where you may be exposed to hepatitis A.  · Hepatitis B vaccine. You may need this if you have certain conditions or if you travel or work in places where you may be exposed to hepatitis B.  · Haemophilus influenzae type b (Hib) vaccine. You may need this if you have certain risk factors.  Talk to your health care provider about which screenings and vaccines you need and how often you need them.  This information is not intended to replace advice given to you by your health care provider. Make sure you discuss any questions you have with your health care provider.  Document Released: 01/13/2017 Document Revised: 02/07/2019 Document Reviewed: 10/18/2016  [employer] Interactive Patient Education © 2019 [employer]

## 2019-08-25 NOTE — PROGRESS NOTES
Subjective    Yehuda Rutledge is a 69 y.o. overweight white male former smoker with COPD, Allergies, H/O Lumbar back surgery, Lumbago/Sciatica, H/O ETOH abuse in remission, Abd Hernia (S/P incisional hernia repair), and other health problems see PMH/PSH. Pt presents for exam, to discuss acute health problem, Tx and F/U plans.    ' Cough, chest congestion, x 1 week, getting worse, feverish, feeling chills at times'.    Fever    This is a new problem. The current episode started more than 1 month ago. The problem occurs constantly. The problem has been resolved. Pertinent negatives include no diarrhea, muscle aches, urinary pain or wheezing. Treatments tried: Cold-Flu med. The treatment provided no relief.   Cough   This is a new problem. The current episode started 1 to 4 weeks ago. The problem has been gradually improving. The cough is productive of sputum. Pertinent negatives include no postnasal drip, shortness of breath or wheezing. Risk factors for lung disease include smoking/tobacco exposure. He has tried OTC cough suppressant for the symptoms. The treatment provided mild relief. His past medical history is significant for COPD and environmental allergies.   Back Pain   This is a chronic problem. The current episode started more than 1 month ago. The problem occurs daily. The problem has been gradually improving since onset. The pain is present in the lumbar spine (Right Lumbar pain radiating down R lat leg. ). The quality of the pain is described as aching, shooting and stabbing. The pain radiates to the right thigh, right knee and right foot. The pain is at a severity of 1/10. The pain is mild. The symptoms are aggravated by standing, twisting and position. Stiffness is present all day. Pertinent negatives include no dysuria. (Pain and numbness radiating down R outer leg) Risk factors include poor posture. He has tried analgesics, bed rest, heat, chiropractic manipulation, home exercises, muscle relaxant,  NSAIDs, walking and ice for the symptoms. The treatment provided significant relief.   COPD   There is no shortness of breath or wheezing. This is a chronic problem. The current episode started more than 1 year ago. The problem occurs daily. The problem has been unchanged. Pertinent negatives include no postnasal drip. His symptoms are aggravated by coughing, exertion and smoking. He reports mild improvement on treatment. His past medical history is significant for COPD.   Neck Pain    This is a chronic problem. The current episode started more than 1 year ago. The problem occurs daily. The problem has been waxing and waning. The pain is associated with an unknown factor. The pain is at a severity of 4/10. The pain is moderate. The pain is worse during the night. He has tried acetaminophen, NSAIDs, muscle relaxants, chiropractic manipulation, home exercises and heat (Neurontin) for the symptoms. The treatment provided moderate relief.   Hyperlipidemia   This is a chronic problem. The current episode started more than 1 year ago. The problem is controlled. Recent lipid tests were reviewed and are variable. Factors aggravating his hyperlipidemia include smoking. Pertinent negatives include no shortness of breath. Current antihyperlipidemic treatment includes statins and diet change. The current treatment provides moderate improvement of lipids. Risk factors for coronary artery disease include male sex and dyslipidemia.        The following portions of the patient's history were reviewed and updated as appropriate: allergies, current medications, past family history, past medical history, past social history, past surgical history and problem list.    Review of Systems   HENT: Negative for facial swelling, mouth sores, postnasal drip and sinus pressure.    Eyes: Negative for pain, discharge and visual disturbance.   Respiratory: Negative for chest tightness, shortness of breath and wheezing.    Cardiovascular: Negative  for leg swelling.   Gastrointestinal: Negative for constipation and diarrhea.        Abd pain resolved after hernia repair.   Endocrine: Negative.  Negative for cold intolerance and heat intolerance.   Genitourinary: Negative.  Negative for difficulty urinating, dysuria, hematuria and urgency.   Musculoskeletal: Positive for back pain.        Neck and Back pain controlled with Neurontin   Skin: Negative.  Negative for color change, pallor and wound.   Allergic/Immunologic: Positive for environmental allergies. Negative for food allergies and immunocompromised state.   Neurological: Negative for tremors and speech difficulty.   Hematological: Negative for adenopathy. Does not bruise/bleed easily.   Psychiatric/Behavioral: Negative for dysphoric mood. The patient is not nervous/anxious and is not hyperactive.        Objective   Physical Exam   Constitutional: He is oriented to person, place, and time. He appears well-developed and well-nourished. No distress.   HENT:   Head: Normocephalic and atraumatic.   Right Ear: External ear normal.   Left Ear: External ear normal.   Nose: Nose normal.   Mouth/Throat: Oropharynx is clear and moist. No oropharyngeal exudate.        Eyes: Conjunctivae and EOM are normal. Pupils are equal, round, and reactive to light. Right eye exhibits no discharge. Left eye exhibits no discharge. No scleral icterus.   Neck: Neck supple. No JVD present. No tracheal deviation present. No thyromegaly present.   Has Full cervical ROM, WB 2.   Cardiovascular: Normal rate, regular rhythm, normal heart sounds and intact distal pulses. Exam reveals no gallop and no friction rub.   No murmur heard.  Pulmonary/Chest: Effort normal. No stridor. No respiratory distress. He has no wheezes. He has no rales. He exhibits no tenderness.   Coarse BBS   Abdominal: Soft. Bowel sounds are normal. He exhibits no distension and no mass. There is no tenderness. There is no rebound and no guarding. No hernia.    Musculoskeletal: He exhibits no edema, tenderness or deformity.   Lymphadenopathy:     He has no cervical adenopathy.   Neurological: He is alert and oriented to person, place, and time. He has normal reflexes. He displays normal reflexes. No cranial nerve deficit. He exhibits normal muscle tone. Coordination normal.   Skin: Skin is warm and dry. Capillary refill takes 2 to 3 seconds. No rash noted. He is not diaphoretic. No erythema. No pallor.   Psychiatric: He has a normal mood and affect. His behavior is normal. Judgment and thought content normal.   Nursing note and vitals reviewed.      Assessment/Plan   Yehuda was seen today for hyperlipidemia, copd, cough and uri.    Diagnoses and all orders for this visit:    Chronic obstructive pulmonary disease, unspecified COPD type (CMS/HCC)    Lower respiratory infection  -     azithromycin (ZITHROMAX) 250 MG tablet; Take 2 tablets the first day, then 1 tablet daily for 4 days.    Chronic cervical radiculopathy  -     gabapentin (NEURONTIN) 100 MG capsule; Take 1 capsule by mouth 2 (Two) Times a Day.    Chronic bilateral low back pain with bilateral sciatica  -     gabapentin (NEURONTIN) 100 MG capsule; Take 1 capsule by mouth 2 (Two) Times a Day.    Other orders  -     brompheniramine-pseudoephedrine-DM 30-2-10 MG/5ML syrup; Take 5 mL by mouth 3 (Three) Times a Day As Needed for Congestion, Cough or Allergies. May take as needed with Zyrtec as needed    Discussed exam, health problems, meds, indications, Tx plan, rationale. Discussed USPSTF recommendations. Discussed F/U plan.          This document has been electronically signed by Buzz Sifuentes MD

## 2019-11-19 ENCOUNTER — OFFICE VISIT (OUTPATIENT)
Dept: FAMILY MEDICINE CLINIC | Facility: CLINIC | Age: 69
End: 2019-11-19

## 2019-11-19 ENCOUNTER — LAB (OUTPATIENT)
Dept: LAB | Facility: HOSPITAL | Age: 69
End: 2019-11-19

## 2019-11-19 VITALS
SYSTOLIC BLOOD PRESSURE: 136 MMHG | DIASTOLIC BLOOD PRESSURE: 82 MMHG | HEIGHT: 66 IN | OXYGEN SATURATION: 98 % | WEIGHT: 182 LBS | TEMPERATURE: 98.6 F | HEART RATE: 80 BPM | BODY MASS INDEX: 29.25 KG/M2

## 2019-11-19 DIAGNOSIS — E66.3 OVERWEIGHT (BMI 25.0-29.9): ICD-10-CM

## 2019-11-19 DIAGNOSIS — R06.09 DYSPNEA ON EXERTION: ICD-10-CM

## 2019-11-19 DIAGNOSIS — R53.82 CHRONIC FATIGUE: ICD-10-CM

## 2019-11-19 DIAGNOSIS — R07.9 RIGHT-SIDED CHEST PAIN: ICD-10-CM

## 2019-11-19 DIAGNOSIS — M54.41 CHRONIC BILATERAL LOW BACK PAIN WITH BILATERAL SCIATICA: ICD-10-CM

## 2019-11-19 DIAGNOSIS — G89.29 CHRONIC BILATERAL LOW BACK PAIN WITH BILATERAL SCIATICA: ICD-10-CM

## 2019-11-19 DIAGNOSIS — M54.42 CHRONIC BILATERAL LOW BACK PAIN WITH BILATERAL SCIATICA: ICD-10-CM

## 2019-11-19 DIAGNOSIS — M54.12 CHRONIC CERVICAL RADICULOPATHY: ICD-10-CM

## 2019-11-19 LAB
ALBUMIN SERPL-MCNC: 4.5 G/DL (ref 3.5–5.2)
ALBUMIN/GLOB SERPL: 1.4 G/DL
ALP SERPL-CCNC: 87 U/L (ref 39–117)
ALT SERPL W P-5'-P-CCNC: 36 U/L (ref 1–41)
ANION GAP SERPL CALCULATED.3IONS-SCNC: 10.6 MMOL/L (ref 5–15)
AST SERPL-CCNC: 27 U/L (ref 1–40)
BASOPHILS # BLD AUTO: 0.06 10*3/MM3 (ref 0–0.2)
BASOPHILS NFR BLD AUTO: 0.7 % (ref 0–1.5)
BILIRUB SERPL-MCNC: 0.3 MG/DL (ref 0.2–1.2)
BILIRUB UR QL STRIP: NEGATIVE
BUN BLD-MCNC: 11 MG/DL (ref 8–23)
BUN/CREAT SERPL: 10.1 (ref 7–25)
CALCIUM SPEC-SCNC: 9.5 MG/DL (ref 8.6–10.5)
CHLORIDE SERPL-SCNC: 103 MMOL/L (ref 98–107)
CLARITY UR: CLEAR
CO2 SERPL-SCNC: 25.4 MMOL/L (ref 22–29)
COLOR UR: YELLOW
CREAT BLD-MCNC: 1.09 MG/DL (ref 0.76–1.27)
CRP SERPL-MCNC: 0.17 MG/DL (ref 0–0.5)
DEPRECATED RDW RBC AUTO: 42.1 FL (ref 37–54)
EOSINOPHIL # BLD AUTO: 0.1 10*3/MM3 (ref 0–0.4)
EOSINOPHIL NFR BLD AUTO: 1.2 % (ref 0.3–6.2)
ERYTHROCYTE [DISTWIDTH] IN BLOOD BY AUTOMATED COUNT: 14.5 % (ref 12.3–15.4)
GFR SERPL CREATININE-BSD FRML MDRD: 67 ML/MIN/1.73
GLOBULIN UR ELPH-MCNC: 3.2 GM/DL
GLUCOSE BLD-MCNC: 107 MG/DL (ref 65–99)
GLUCOSE UR STRIP-MCNC: NEGATIVE MG/DL
HCT VFR BLD AUTO: 40.4 % (ref 37.5–51)
HGB BLD-MCNC: 13.9 G/DL (ref 13–17.7)
HGB UR QL STRIP.AUTO: NEGATIVE
IMM GRANULOCYTES # BLD AUTO: 0.04 10*3/MM3 (ref 0–0.05)
IMM GRANULOCYTES NFR BLD AUTO: 0.5 % (ref 0–0.5)
KETONES UR QL STRIP: NEGATIVE
LEUKOCYTE ESTERASE UR QL STRIP.AUTO: NEGATIVE
LYMPHOCYTES # BLD AUTO: 4.35 10*3/MM3 (ref 0.7–3.1)
LYMPHOCYTES NFR BLD AUTO: 52.7 % (ref 19.6–45.3)
MCH RBC QN AUTO: 27.8 PG (ref 26.6–33)
MCHC RBC AUTO-ENTMCNC: 34.4 G/DL (ref 31.5–35.7)
MCV RBC AUTO: 80.8 FL (ref 79–97)
MONOCYTES # BLD AUTO: 0.42 10*3/MM3 (ref 0.1–0.9)
MONOCYTES NFR BLD AUTO: 5.1 % (ref 5–12)
NEUTROPHILS # BLD AUTO: 3.28 10*3/MM3 (ref 1.7–7)
NEUTROPHILS NFR BLD AUTO: 39.8 % (ref 42.7–76)
NITRITE UR QL STRIP: NEGATIVE
NRBC BLD AUTO-RTO: 0.1 /100 WBC (ref 0–0.2)
PH UR STRIP.AUTO: 6.5 [PH] (ref 5–8)
PLATELET # BLD AUTO: 246 10*3/MM3 (ref 140–450)
PMV BLD AUTO: 10.7 FL (ref 6–12)
POTASSIUM BLD-SCNC: 4.9 MMOL/L (ref 3.5–5.2)
PROT SERPL-MCNC: 7.7 G/DL (ref 6–8.5)
PROT UR QL STRIP: NEGATIVE
RBC # BLD AUTO: 5 10*6/MM3 (ref 4.14–5.8)
SODIUM BLD-SCNC: 139 MMOL/L (ref 136–145)
SP GR UR STRIP: 1.01 (ref 1–1.03)
UROBILINOGEN UR QL STRIP: NORMAL
WBC NRBC COR # BLD: 8.25 10*3/MM3 (ref 3.4–10.8)

## 2019-11-19 PROCEDURE — 93010 ELECTROCARDIOGRAM REPORT: CPT | Performed by: INTERNAL MEDICINE

## 2019-11-19 PROCEDURE — 80053 COMPREHEN METABOLIC PANEL: CPT

## 2019-11-19 PROCEDURE — 93005 ELECTROCARDIOGRAM TRACING: CPT | Performed by: FAMILY MEDICINE

## 2019-11-19 PROCEDURE — 86140 C-REACTIVE PROTEIN: CPT

## 2019-11-19 PROCEDURE — 99214 OFFICE O/P EST MOD 30 MIN: CPT | Performed by: FAMILY MEDICINE

## 2019-11-19 PROCEDURE — 81003 URINALYSIS AUTO W/O SCOPE: CPT

## 2019-11-19 PROCEDURE — 85025 COMPLETE CBC W/AUTO DIFF WBC: CPT

## 2019-11-19 RX ORDER — GABAPENTIN 100 MG/1
100 CAPSULE ORAL 2 TIMES DAILY
Qty: 60 CAPSULE | Refills: 3 | Status: SHIPPED | OUTPATIENT
Start: 2019-11-19 | End: 2020-02-18 | Stop reason: SDUPTHER

## 2019-11-19 NOTE — PROGRESS NOTES
Subjective    Yehuda Rutledge is a 69 y.o. overweight white male former smoker with COPD, Allergies, H/O Lumbar back surgery, Lumbago/Sciatica, H/O ETOH abuse in remission, Abd Hernia (S/P incisional hernia repair), and other health problems see PMH/PSH. Pt presents for exam, to discuss acute health problem, Tx and F/U plans.     ' Having occasional R chest pain, some SOA on exertion. Feeling tired more than usual, mouth dry a lot. Back pain has been stable'.    Back Pain   This is a chronic problem. The current episode started more than 1 month ago. The problem occurs daily. The problem has been gradually improving since onset. The pain is present in the lumbar spine (Right Lumbar pain radiating down R lat leg. ). The quality of the pain is described as aching, shooting and stabbing. The pain radiates to the right thigh, right knee and right foot. The pain is at a severity of 1/10. The pain is mild. The symptoms are aggravated by standing, twisting and position. Stiffness is present all day. Associated symptoms include chest pain. Pertinent negatives include no abdominal pain, dysuria or fever. (Pain and numbness radiating down R outer leg) Risk factors include poor posture. He has tried analgesics, bed rest, heat, chiropractic manipulation, home exercises, muscle relaxant, NSAIDs, walking and ice for the symptoms. The treatment provided significant relief.   COPD   He complains of difficulty breathing, shortness of breath and wheezing. There is no cough. This is a chronic problem. The current episode started more than 1 year ago. The problem occurs daily. The problem has been unchanged. Associated symptoms include chest pain and malaise/fatigue. Pertinent negatives include no fever, myalgias, postnasal drip or sore throat. His symptoms are aggravated by coughing, exertion and smoking. His symptoms are alleviated by beta-agonist. He reports mild improvement on treatment. His past medical history is significant for  COPD.   Neck Pain    This is a chronic problem. The current episode started more than 1 year ago. The problem occurs daily. The problem has been waxing and waning. The pain is associated with an unknown factor. The pain is at a severity of 4/10. The pain is moderate. The pain is worse during the night. Associated symptoms include chest pain. Pertinent negatives include no fever. He has tried acetaminophen, NSAIDs, muscle relaxants, chiropractic manipulation, home exercises and heat (Neurontin) for the symptoms. The treatment provided moderate relief.   Hyperlipidemia   This is a chronic problem. The current episode started more than 1 year ago. The problem is controlled. Recent lipid tests were reviewed and are variable. Factors aggravating his hyperlipidemia include smoking. Associated symptoms include chest pain and shortness of breath. Pertinent negatives include no myalgias. Current antihyperlipidemic treatment includes statins and diet change. The current treatment provides moderate improvement of lipids. Risk factors for coronary artery disease include male sex and dyslipidemia.   Chest Pain    This is a new problem. The current episode started more than 1 month ago. The onset quality is gradual. The problem occurs intermittently. The problem has been gradually worsening. The pain is present in the lateral region. The pain is at a severity of 5/10. The pain is moderate. The quality of the pain is described as squeezing. Radiates to: R chest. Associated symptoms include back pain, exertional chest pressure, malaise/fatigue and shortness of breath. Pertinent negatives include no abdominal pain, cough, fever or palpitations. The pain is aggravated by exertion. Risk factors include smoking/tobacco exposure and male gender.   His past medical history is significant for COPD and hyperlipidemia.   His family medical history is significant for diabetes and hypertension. Prior diagnostic workup includes chest x-ray.    Shortness of Breath   This is a chronic problem. The current episode started more than 1 year ago. The problem occurs intermittently. The problem has been waxing and waning. Associated symptoms include chest pain, neck pain and wheezing. Pertinent negatives include no abdominal pain, fever, leg swelling or sore throat. The symptoms are aggravated by any activity. He has tried beta agonist inhalers for the symptoms. His past medical history is significant for COPD.        The following portions of the patient's history were reviewed and updated as appropriate: allergies, current medications, past family history, past medical history, past social history, past surgical history and problem list.    Review of Systems   Constitutional: Positive for malaise/fatigue. Negative for chills, fatigue and fever.   HENT: Negative for congestion, facial swelling, mouth sores, postnasal drip, sinus pressure and sore throat.    Eyes: Negative for pain, discharge, itching and visual disturbance.   Respiratory: Positive for shortness of breath and wheezing. Negative for cough and chest tightness.    Cardiovascular: Positive for chest pain. Negative for palpitations and leg swelling.   Gastrointestinal: Negative for abdominal pain, blood in stool, constipation and diarrhea.        Abd pain resolved after hernia repair.   Endocrine: Negative.  Negative for cold intolerance and heat intolerance.   Genitourinary: Negative.  Negative for difficulty urinating, dysuria, hematuria and urgency.   Musculoskeletal: Positive for arthralgias, back pain and neck pain. Negative for myalgias.        Neck and Back pain controlled with Neurontin   Skin: Negative.  Negative for color change, pallor and wound.   Allergic/Immunologic: Positive for environmental allergies. Negative for food allergies and immunocompromised state.   Neurological: Negative.  Negative for tremors and speech difficulty.   Hematological: Negative for adenopathy. Does not  bruise/bleed easily.   Psychiatric/Behavioral: Negative.  Negative for agitation, dysphoric mood and sleep disturbance. The patient is not nervous/anxious and is not hyperactive.        Objective   Physical Exam   Constitutional: He is oriented to person, place, and time. He appears well-developed and well-nourished. No distress.   HENT:   Head: Normocephalic and atraumatic.   Right Ear: External ear normal.   Left Ear: External ear normal.   Nose: Nose normal.   Mouth/Throat: Oropharynx is clear and moist. No oropharyngeal exudate.        Eyes: Conjunctivae and EOM are normal. Pupils are equal, round, and reactive to light. Right eye exhibits no discharge. Left eye exhibits no discharge. No scleral icterus.   Neck: Neck supple. No JVD present. No tracheal deviation present. No thyromegaly present.   Has Full cervical ROM, WB 2.   Cardiovascular: Normal rate, regular rhythm, normal heart sounds and intact distal pulses. Exam reveals no gallop and no friction rub.   No murmur heard.  Pulmonary/Chest: Effort normal and breath sounds normal. No stridor. No respiratory distress. He has no wheezes. He has no rales. He exhibits no tenderness.   No chest wall pain with palpation   Abdominal: Soft. Bowel sounds are normal. He exhibits no distension and no mass. There is no tenderness. There is no rebound and no guarding. No hernia.   Musculoskeletal: He exhibits no edema, tenderness or deformity.   WB 4 with ROM back.   Lymphadenopathy:     He has no cervical adenopathy.   Neurological: He is alert and oriented to person, place, and time. He has normal reflexes. He displays normal reflexes. No cranial nerve deficit or sensory deficit. He exhibits normal muscle tone. Coordination normal.   Skin: Skin is warm and dry. Capillary refill takes 2 to 3 seconds. No rash noted. He is not diaphoretic. No erythema. No pallor.   Psychiatric: He has a normal mood and affect. His behavior is normal. Judgment and thought content normal.    Nursing note and vitals reviewed.      Assessment/Plan   Yehuda was seen today for copd, back pain, hyperlipidemia, chest pain, dry mouth and cough.    Diagnoses and all orders for this visit:    Chronic cervical radiculopathy  -     gabapentin (NEURONTIN) 100 MG capsule; Take 1 capsule by mouth 2 (Two) Times a Day.    Chronic bilateral low back pain with bilateral sciatica  -     gabapentin (NEURONTIN) 100 MG capsule; Take 1 capsule by mouth 2 (Two) Times a Day.    Chronic fatigue  -     Cancel: XR Chest AP (In Office); Future  -     CBC & Differential; Future  -     Comprehensive metabolic panel; Future  -     Urinalysis With Culture If Indicated -; Future  -     C-reactive Protein; Future  -     XR chest 2 vw; Future    Dyspnea on exertion  -     Cancel: XR Chest AP (In Office); Future  -     Comprehensive metabolic panel; Future  -     C-reactive Protein; Future  -     ECG 12 Lead  -     Ambulatory Referral to Cardiology  -     XR chest 2 vw; Future    Right-sided chest pain  -     Cancel: XR Chest AP (In Office); Future  -     Comprehensive metabolic panel; Future  -     C-reactive Protein; Future  -     ECG 12 Lead  -     Ambulatory Referral to Cardiology  -     XR chest 2 vw; Future    Overweight (BMI 25.0-29.9)      Discussed exam, health problems, meds, indications, tx plan, rationale. Discussed BMI, BEE, diet, exercise. Discussed referral to Cardiology.  Discussed F/U with specialist. Discussed F/U here.    Patient's Body mass index is 29.38 kg/m². BMI is above normal parameters. Recommendations include: educational material, exercise counseling, nutrition counseling and referral to primary care.          This document has been electronically signed by Buzz Sifuentes MD

## 2019-11-21 ENCOUNTER — TELEPHONE (OUTPATIENT)
Dept: FAMILY MEDICINE CLINIC | Facility: CLINIC | Age: 69
End: 2019-11-21

## 2019-11-30 PROBLEM — R07.9 RIGHT-SIDED CHEST PAIN: Status: ACTIVE | Noted: 2019-11-30

## 2019-11-30 PROBLEM — R53.82 CHRONIC FATIGUE: Status: ACTIVE | Noted: 2019-11-30

## 2019-11-30 PROBLEM — R06.09 DYSPNEA ON EXERTION: Status: ACTIVE | Noted: 2019-11-30

## 2019-11-30 NOTE — PATIENT INSTRUCTIONS
Health Maintenance After Age 65  After age 65, you are at a higher risk for certain long-term diseases and infections as well as injuries from falls. Falls are a major cause of broken bones and head injuries in people who are older than age 65. Getting regular preventive care can help to keep you healthy and well. Preventive care includes getting regular testing and making lifestyle changes as recommended by your health care provider. Talk with your health care provider about:  · Which screenings and tests you should have. A screening is a test that checks for a disease when you have no symptoms.  · A diet and exercise plan that is right for you.  What should I know about screenings and tests to prevent falls?  Screening and testing are the best ways to find a health problem early. Early diagnosis and treatment give you the best chance of managing medical conditions that are common after age 65. Certain conditions and lifestyle choices may make you more likely to have a fall. Your health care provider may recommend:  · Regular vision checks. Poor vision and conditions such as cataracts can make you more likely to have a fall. If you wear glasses, make sure to get your prescription updated if your vision changes.  · Medicine review. Work with your health care provider to regularly review all of the medicines you are taking, including over-the-counter medicines. Ask your health care provider about any side effects that may make you more likely to have a fall. Tell your health care provider if any medicines that you take make you feel dizzy or sleepy.  · Osteoporosis screening. Osteoporosis is a condition that causes the bones to get weaker. This can make the bones weak and cause them to break more easily.  · Blood pressure screening. Blood pressure changes and medicines to control blood pressure can make you feel dizzy.  · Strength and balance checks. Your health care provider may recommend certain tests to check your  strength and balance while standing, walking, or changing positions.  · Foot health exam. Foot pain and numbness, as well as not wearing proper footwear, can make you more likely to have a fall.  · Depression screening. You may be more likely to have a fall if you have a fear of falling, feel emotionally low, or feel unable to do activities that you used to do.  · Alcohol use screening. Using too much alcohol can affect your balance and may make you more likely to have a fall.  What actions can I take to lower my risk of falls?  General instructions  · Talk with your health care provider about your risks for falling. Tell your health care provider if:  ? You fall. Be sure to tell your health care provider about all falls, even ones that seem minor.  ? You feel dizzy, sleepy, or off-balance.  · Take over-the-counter and prescription medicines only as told by your health care provider. These include any supplements.  · Eat a healthy diet and maintain a healthy weight. A healthy diet includes low-fat dairy products, low-fat (lean) meats, and fiber from whole grains, beans, and lots of fruits and vegetables.  Home safety  · Remove any tripping hazards, such as rugs, cords, and clutter.  · Install safety equipment such as grab bars in bathrooms and safety rails on stairs.  · Keep rooms and walkways well-lit.  Activity    · Follow a regular exercise program to stay fit. This will help you maintain your balance. Ask your health care provider what types of exercise are appropriate for you.  · If you need a cane or walker, use it as recommended by your health care provider.  · Wear supportive shoes that have nonskid soles.  Lifestyle  · Do not drink alcohol if your health care provider tells you not to drink.  · If you drink alcohol, limit how much you have:  ? 0-1 drink a day for women.  ? 0-2 drinks a day for men.  · Be aware of how much alcohol is in your drink. In the [place] one drink equals one typical bottle of beer (12  oz), one-half glass of wine (5 oz), or one shot of hard liquor (1½ oz).  · Do not use any products that contain nicotine or tobacco, such as cigarettes and e-cigarettes. If you need help quitting, ask your health care provider.  Summary  · Having a healthy lifestyle and getting preventive care can help to protect your health and wellness after age 65.  · Screening and testing are the best way to find a health problem early and help you avoid having a fall. Early diagnosis and treatment give you the best chance for managing medical conditions that are more common for people who are older than age 65.  · Falls are a major cause of broken bones and head injuries in people who are older than age 65. Take precautions to prevent a fall at home.  · Work with your health care provider to learn what changes you can make to improve your health and wellness and to prevent falls.  This information is not intended to replace advice given to you by your health care provider. Make sure you discuss any questions you have with your health care provider.  Document Released: 10/31/2018 Document Revised: 10/31/2018 Document Reviewed: 10/31/2018  [employer] Patient Education © 2019 [employer]

## 2019-12-16 ENCOUNTER — TELEPHONE (OUTPATIENT)
Dept: CARDIOLOGY | Facility: CLINIC | Age: 69
End: 2019-12-16

## 2019-12-17 ENCOUNTER — TELEPHONE (OUTPATIENT)
Dept: CARDIOLOGY | Facility: CLINIC | Age: 69
End: 2019-12-17

## 2019-12-17 NOTE — TELEPHONE ENCOUNTER
Called patient to see if they had seen another cardiologist for medical record purposes, he stated that he had seen one in 2014 here at [facility] but that was the last time.

## 2019-12-17 NOTE — TELEPHONE ENCOUNTER
Called patient to see if he has seen another cardiologist before for medical records, there was no answer.

## 2019-12-20 ENCOUNTER — TELEPHONE (OUTPATIENT)
Dept: CARDIOLOGY | Facility: CLINIC | Age: 69
End: 2019-12-20

## 2019-12-20 ENCOUNTER — OFFICE VISIT (OUTPATIENT)
Dept: CARDIOLOGY | Facility: CLINIC | Age: 69
End: 2019-12-20

## 2019-12-20 VITALS
SYSTOLIC BLOOD PRESSURE: 140 MMHG | WEIGHT: 149 LBS | BODY MASS INDEX: 23.95 KG/M2 | HEART RATE: 84 BPM | OXYGEN SATURATION: 98 % | HEIGHT: 66 IN | DIASTOLIC BLOOD PRESSURE: 80 MMHG

## 2019-12-20 DIAGNOSIS — J44.9 CHRONIC OBSTRUCTIVE PULMONARY DISEASE, UNSPECIFIED COPD TYPE (HCC): ICD-10-CM

## 2019-12-20 DIAGNOSIS — R06.02 SOB (SHORTNESS OF BREATH): ICD-10-CM

## 2019-12-20 DIAGNOSIS — R07.9 CHEST PAIN, UNSPECIFIED TYPE: ICD-10-CM

## 2019-12-20 DIAGNOSIS — I73.9 CLAUDICATION (HCC): ICD-10-CM

## 2019-12-20 DIAGNOSIS — R07.9 CHEST PAIN, UNSPECIFIED TYPE: Primary | ICD-10-CM

## 2019-12-20 DIAGNOSIS — E78.5 HYPERLIPIDEMIA, UNSPECIFIED HYPERLIPIDEMIA TYPE: Primary | ICD-10-CM

## 2019-12-20 PROCEDURE — 93000 ELECTROCARDIOGRAM COMPLETE: CPT | Performed by: INTERNAL MEDICINE

## 2019-12-20 PROCEDURE — 99204 OFFICE O/P NEW MOD 45 MIN: CPT | Performed by: INTERNAL MEDICINE

## 2019-12-20 NOTE — PROGRESS NOTES
Tried to call to give patient date, time, and instructions for Coronary CTA. Left message to call office.

## 2019-12-20 NOTE — PROGRESS NOTES
[facility]  OFFICE NOTE    Cardiovascular Medicine  Ava Colin M.D., RPVI         Buzz Sifuentes MD  [address]  [place] [address]    Thank you for asking me to see Yehuda Rutledge for chest pain.    History of Present Illness  This is a 69 y.o. male with:    1.  Elevated blood pressure diagnosis of hypertension #  2 hyperlipidemia  3.  Chronic lung disease  4.  Neuropathy  5.  Chest pain    Yehuda Rutledge is a 69 y.o. male who presents for consultation today.  Patient is here for further evaluation for shortness of breath, this is new in onset over the last several months, he reported he will get short of breath on mild exertion, he has good days and bad days he has noticed it particularly after meals he is short of breath after walking for 5 minutes.  He has to sit down to catch his breath.  Times on exertion he is also having heaviness which is across his chest, which resolves when he is resting as well.  Again is not happening every time he is exerting but he has good days and bad days.  He is denying orthopnea PND lower extremity swelling or palpitations.  No prior history myocardial infarction.  He does have elevated blood pressure in office without diagnosis of hypertension.  He also has hyperlipidemia and is a previous smoker.  Denies any family history premature coronary artery disease      Review of Systems - ROS  Constitution: Negative for weakness, weight gain and weight loss.   HENT: Negative for congestion.    Eyes: Negative for blurred vision.   Cardiovascular: As mentioned above  Respiratory: Negative for cough and hemoptysis.    Endocrine: Negative for polydipsia and polyuria.   Hematologic/Lymphatic: Negative for bleeding problem. Does not bruise/bleed easily.   Skin: Negative for flushing.   Musculoskeletal: Negative for neck pain and stiffness.   Gastrointestinal: Negative for abdominal pain, diarrhea, jaundice, melena, nausea and  vomiting.   Genitourinary: Negative for dysuria and hematuria.   Neurological: Negative for dizziness, focal weakness and numbness.   Psychiatric/Behavioral: Negative for altered mental status and depression.          All other systems were reviewed and were negative.    family history includes Diabetes in his mother.     reports that he quit smoking about 2 years ago. His smoking use included cigarettes. He has a 40.00 pack-year smoking history. He has never used smokeless tobacco. He reports that he does not drink alcohol or use drugs.    Allergies   Allergen Reactions   • Penicillins Rash         Current Outpatient Medications:   •  albuterol (PROVENTIL HFA;VENTOLIN HFA) 108 (90 BASE) MCG/ACT inhaler, Inhale 2 puffs Every 4 (Four) Hours As Needed. 2 puffs by  Mouth every 4-6 hours  , Disp: , Rfl:   •  aspirin 81 MG EC tablet, Take 81 mg by mouth daily., Disp: , Rfl:   •  brompheniramine-pseudoephedrine-DM 30-2-10 MG/5ML syrup, Take 5 mL by mouth 3 (Three) Times a Day As Needed for Congestion, Cough or Allergies. May take as needed with Zyrtec as needed, Disp: 118 mL, Rfl: 1  •  cetirizine (zyrTEC) 10 MG tablet, Take 1 tablet by mouth Daily., Disp: 30 tablet, Rfl: 5  •  cyclobenzaprine (FLEXERIL) 10 MG tablet, Take 10 mg by mouth 2 (two) times a day as needed for muscle spasms., Disp: , Rfl:   •  fluticasone (FLONASE) 50 MCG/ACT nasal spray, 2 sprays into each nostril Daily As Needed. Administer 2 sprays in each nostril for each dose. , Disp: , Rfl:   •  gabapentin (NEURONTIN) 100 MG capsule, Take 1 capsule by mouth 2 (Two) Times a Day., Disp: 60 capsule, Rfl: 3  •  guaiFENesin (MUCINEX PO), Take  by mouth., Disp: , Rfl:   •  meloxicam (MOBIC) 15 MG tablet, TAKE 1 TABLET BY MOUTH EVERY DAY, Disp: 90 tablet, Rfl: 1  •  Misc Natural Products (OSTEO BI-FLEX JOINT SHIELD PO), Take 1 tablet by mouth Daily., Disp: , Rfl:   •  Misc Natural Products (PROSTATE HEALTH) capsule, Take 1 capsule by mouth 2 (Two) Times a Day.,  "Disp: , Rfl:   •  Multiple Vitamins-Minerals (MULTIVITAMIN ADULT PO), Take 1 tablet by mouth Daily., Disp: , Rfl:   •  Omega-3 Fatty Acids (FISH OIL) 1000 MG capsule capsule, Take 1,000 mg by mouth Daily With Breakfast., Disp: , Rfl:   •  pravastatin (PRAVACHOL) 20 MG tablet, Take 1 tablet by mouth Every Night., Disp: 90 tablet, Rfl: 1  •  metoprolol tartrate (LOPRESSOR) 25 MG tablet, Take 1 tablet by mouth 2 (Two) Times a Day., Disp: 180 tablet, Rfl: 3    Physical Exam:  Vitals:    12/20/19 0757   BP: 140/80   BP Location: Left arm   Patient Position: Sitting   Cuff Size: Adult   Pulse: 84   SpO2: 98%   Weight: 67.6 kg (149 lb)   Height: 167.6 cm (65.98\")   PainSc: 0-No pain     Current Pain Level: none  Pulse Ox: Normal  on room air  General: alert, appears stated age and cooperative     Body Habitus: well-nourished    HEENT: Head: Normocephalic, no lesions, without obvious abnormality. No arcus senilis, xanthelasma or xanthomas.    Neuro: alert, oriented x3  Pulses: 2+ and symmetric  JVP: Volume/Pulsation: Normal.  Normal waveforms.   Appropriate inspiratory decrease.  No Kussmaul's. No Shane's.   Carotid Exam: no bruit normal pulsation bilaterally   Carotid Volume: normal.     Respirations: no increased work of breathing   Chest:  Normal    Pulmonary:Normal   Precordium: Normal impulses. P2 is not palpable.  RV Heave: absent  LV Heave: absent  [place]:  normal size and placement  Palpable S4: absent.  Heart rate: normal    Heart Rhythm: regular     Heart Sounds: S1: normal  S2: normal  S3: absent   S4: absent  Opening Snap: absent    Pericardial Rub:  Absent: .    Abdomen:   Appearance: normal .  Palpation: Soft, non-tender to palpation, bowel sounds positive in all four quadrants; no guarding or rebound tenderness  Extremity: no edema.   LE Skin: no rashes  LE Hair:  normal  LE Pulses: well perfused with normal pulses in the distal extremities  Pallor on elevation: Absent. Rubor on dependency: None      DATA " REVIEWED:     EKG. I personally reviewed and interpreted the EKG.  Normal sinus rhythm with nonspecific T wave changes    ECG/EMG Results (all)     None      CT:   Xr Chest 2 Vw    Result Date: 11/19/2019  No acute abnormality identified. Electronically signed by:  Teresa Murguia MD  11/19/2019 2:04 PM [employer] Workstation: [id number]      --------------------------------------------------------------------------------------------------  LABS:     The 10-year CVD risk score (Kait et al., 2008) is: 37.1%    Values used to calculate the score:      Age: 69 years      Sex: Male      Diabetic: No      Tobacco smoker: No      Systolic Blood Pressure: 140 mmHg      Is BP treated: No      HDL Cholesterol: 29 mg/dL      Total Cholesterol: 222 mg/dL         Lab Results   Component Value Date    GLUCOSE 107 (H) 11/19/2019    BUN 11 11/19/2019    CREATININE 1.09 11/19/2019    EGFRIFNONA 67 11/19/2019    BCR 10.1 11/19/2019    K 4.9 11/19/2019    CO2 25.4 11/19/2019    CALCIUM 9.5 11/19/2019    ALBUMIN 4.50 11/19/2019    AST 27 11/19/2019    ALT 36 11/19/2019     Lab Results   Component Value Date    WBC 8.25 11/19/2019    HGB 13.9 11/19/2019    HCT 40.4 11/19/2019    MCV 80.8 11/19/2019     11/19/2019     Lab Results   Component Value Date    CHOL 222 (H) 05/23/2019    CHLPL 194 01/13/2016    TRIG 598 (H) 05/23/2019    HDL 29 (L) 05/23/2019    LDL  05/23/2019      Comment:      Unable to calculate    LDL 78 05/23/2019     Lab Results   Component Value Date    TSH 3.080 03/06/2018     No results found for: CKTOTAL, CKMB, CKMBINDEX, TROPONINI, TROPONINT  Lab Results   Component Value Date    HGBA1C 5.9 (H) 03/06/2018     No results found for: DDIMER  Lab Results   Component Value Date    ALT 36 11/19/2019     Lab Results   Component Value Date    HGBA1C 5.9 (H) 03/06/2018    HGBA1C 5.7 (H) 01/13/2016     Lab Results   Component Value Date    CREATININE 1.09 11/19/2019     No results found for: IRON, TIBC, FERRITIN  No  results found for: INR, PROTIME    Assessment/Plan     1. Hyperlipidemia:  Continue pravastatin.  He checks his labs with his primary care physician    2. Chronic obstructive pulmonary disease, unspecified COPD type (CMS/HCC)  Being managed with primary care physician will check his PFTs.    3. Chest pain:  With his risk factors, age and symptoms we will plan on performing a further risk evaluation with CTA coronary angiogram.  Start him on metoprolol 25 mg twice daily his blood pressure is elevated.  Advised him to seek medical attention very have significant chest pain  Also getting an echocardiogram.  - CT Angiogram Coronary  - Adult Transthoracic Echo Complete W/ Cont if Necessary Per Protocol    4. SOB (shortness of breath)  Echocardiogram to assess for any structural abnormalities, assess his LV function and valvular abnormalities, will also get PFTs since his prior history of smoking.  Low probability for PE.  - CT Angiogram Coronary  - Adult Transthoracic Echo Complete W/ Cont if Necessary Per Protocol  - Pulmonary Function Test        Prevention:  Patient's Body mass index is 24.06 kg/m². BMI is within normal parameters. No follow-up required..      Yehuda Rutledge is a former smoker    AAA Screening:   Will discuss on next visit     Return in about 3 months (around 3/20/2020).          This document has been electronically signed by Ava Colin MD on December 20, 2019 8:23 AM

## 2020-01-03 ENCOUNTER — OFFICE VISIT (OUTPATIENT)
Dept: PULMONOLOGY | Facility: CLINIC | Age: 70
End: 2020-01-03

## 2020-01-03 ENCOUNTER — HOSPITAL ENCOUNTER (OUTPATIENT)
Dept: CT IMAGING | Facility: HOSPITAL | Age: 70
Discharge: HOME | End: 2020-01-03
Admitting: INTERNAL MEDICINE

## 2020-01-03 ENCOUNTER — LAB (OUTPATIENT)
Dept: LAB | Facility: HOSPITAL | Age: 70
End: 2020-01-03

## 2020-01-03 DIAGNOSIS — R06.02 SHORTNESS OF BREATH: Primary | ICD-10-CM

## 2020-01-03 DIAGNOSIS — R07.9 CHEST PAIN, UNSPECIFIED TYPE: ICD-10-CM

## 2020-01-03 LAB
ANION GAP SERPL CALCULATED.3IONS-SCNC: 11 MMOL/L (ref 5–15)
BH CV ECHO MEAS - ACS: 1.4 CM
BH CV ECHO MEAS - AO MAX PG (FULL): 2.4 MMHG
BH CV ECHO MEAS - AO MAX PG: 5.9 MMHG
BH CV ECHO MEAS - AO MEAN PG (FULL): 2 MMHG
BH CV ECHO MEAS - AO MEAN PG: 3 MMHG
BH CV ECHO MEAS - AO ROOT AREA (BSA CORRECTED): 1.4
BH CV ECHO MEAS - AO ROOT AREA: 5.3 CM^2
BH CV ECHO MEAS - AO ROOT DIAM: 2.6 CM
BH CV ECHO MEAS - AO V2 MAX: 121 CM/SEC
BH CV ECHO MEAS - AO V2 MEAN: 76 CM/SEC
BH CV ECHO MEAS - AO V2 VTI: 23.2 CM
BH CV ECHO MEAS - ASC AORTA: 3.2 CM
BH CV ECHO MEAS - AVA(I,A): 1.7 CM^2
BH CV ECHO MEAS - AVA(I,D): 1.7 CM^2
BH CV ECHO MEAS - AVA(V,A): 1.5 CM^2
BH CV ECHO MEAS - AVA(V,D): 1.5 CM^2
BH CV ECHO MEAS - BSA(HAYCOCK): 1.8 M^2
BH CV ECHO MEAS - BSA: 1.8 M^2
BH CV ECHO MEAS - BZI_BMI: 22 KILOGRAMS/M^2
BH CV ECHO MEAS - BZI_METRIC_HEIGHT: 175.3 CM
BH CV ECHO MEAS - BZI_METRIC_WEIGHT: 67.6 KG
BH CV ECHO MEAS - EDV(CUBED): 91.1 ML
BH CV ECHO MEAS - EDV(MOD-SP2): 99 ML
BH CV ECHO MEAS - EDV(MOD-SP4): 86 ML
BH CV ECHO MEAS - EDV(TEICH): 92.4 ML
BH CV ECHO MEAS - EF(CUBED): 75.9 %
BH CV ECHO MEAS - EF(MOD-SP2): 63.6 %
BH CV ECHO MEAS - EF(MOD-SP4): 69.8 %
BH CV ECHO MEAS - EF(TEICH): 68 %
BH CV ECHO MEAS - ESV(CUBED): 22 ML
BH CV ECHO MEAS - ESV(MOD-SP2): 36 ML
BH CV ECHO MEAS - ESV(MOD-SP4): 26 ML
BH CV ECHO MEAS - ESV(TEICH): 29.6 ML
BH CV ECHO MEAS - FS: 37.8 %
BH CV ECHO MEAS - IVS/LVPW: 0.78
BH CV ECHO MEAS - IVSD: 0.7 CM
BH CV ECHO MEAS - LA DIMENSION: 3.1 CM
BH CV ECHO MEAS - LA/AO: 1.2
BH CV ECHO MEAS - LV DIASTOLIC VOL/BSA (35-75): 47.2 ML/M^2
BH CV ECHO MEAS - LV MASS(C)D: 113.6 GRAMS
BH CV ECHO MEAS - LV MASS(C)DI: 62.3 GRAMS/M^2
BH CV ECHO MEAS - LV MAX PG: 3.4 MMHG
BH CV ECHO MEAS - LV MEAN PG: 1 MMHG
BH CV ECHO MEAS - LV SYSTOLIC VOL/BSA (12-30): 14.3 ML/M^2
BH CV ECHO MEAS - LV V1 MAX: 92.5 CM/SEC
BH CV ECHO MEAS - LV V1 MEAN: 49 CM/SEC
BH CV ECHO MEAS - LV V1 VTI: 19.1 CM
BH CV ECHO MEAS - LVIDD: 4.5 CM
BH CV ECHO MEAS - LVIDS: 2.8 CM
BH CV ECHO MEAS - LVLD AP2: 7.4 CM
BH CV ECHO MEAS - LVLD AP4: 7.4 CM
BH CV ECHO MEAS - LVLS AP2: 6.5 CM
BH CV ECHO MEAS - LVLS AP4: 6.3 CM
BH CV ECHO MEAS - LVOT AREA (M): 2 CM^2
BH CV ECHO MEAS - LVOT AREA: 2 CM^2
BH CV ECHO MEAS - LVOT DIAM: 1.6 CM
BH CV ECHO MEAS - LVPWD: 0.9 CM
BH CV ECHO MEAS - MV A MAX VEL: 87.4 CM/SEC
BH CV ECHO MEAS - MV DEC SLOPE: 649 CM/SEC^2
BH CV ECHO MEAS - MV E MAX VEL: 63.7 CM/SEC
BH CV ECHO MEAS - MV E/A: 0.73
BH CV ECHO MEAS - MV MAX PG: 4.7 MMHG
BH CV ECHO MEAS - MV MEAN PG: 2 MMHG
BH CV ECHO MEAS - MV P1/2T MAX VEL: 90.9 CM/SEC
BH CV ECHO MEAS - MV P1/2T: 41 MSEC
BH CV ECHO MEAS - MV V2 MAX: 108 CM/SEC
BH CV ECHO MEAS - MV V2 MEAN: 61.3 CM/SEC
BH CV ECHO MEAS - MV V2 VTI: 23.9 CM
BH CV ECHO MEAS - MVA P1/2T LCG: 2.4 CM^2
BH CV ECHO MEAS - MVA(P1/2T): 5.4 CM^2
BH CV ECHO MEAS - MVA(VTI): 1.6 CM^2
BH CV ECHO MEAS - PA MAX PG: 1.8 MMHG
BH CV ECHO MEAS - PA MEAN PG: 1 MMHG
BH CV ECHO MEAS - PA V2 MAX: 66.2 CM/SEC
BH CV ECHO MEAS - PA V2 MEAN: 42.6 CM/SEC
BH CV ECHO MEAS - PA V2 VTI: 12 CM
BH CV ECHO MEAS - SI(AO): 67.6 ML/M^2
BH CV ECHO MEAS - SI(CUBED): 37.9 ML/M^2
BH CV ECHO MEAS - SI(LVOT): 21.1 ML/M^2
BH CV ECHO MEAS - SI(MOD-SP2): 34.6 ML/M^2
BH CV ECHO MEAS - SI(MOD-SP4): 32.9 ML/M^2
BH CV ECHO MEAS - SI(TEICH): 34.5 ML/M^2
BH CV ECHO MEAS - SV(AO): 123.2 ML
BH CV ECHO MEAS - SV(CUBED): 69.2 ML
BH CV ECHO MEAS - SV(LVOT): 38.4 ML
BH CV ECHO MEAS - SV(MOD-SP2): 63 ML
BH CV ECHO MEAS - SV(MOD-SP4): 60 ML
BH CV ECHO MEAS - SV(TEICH): 62.9 ML
BH CV VAS BP RIGHT ARM: NORMAL MMHG
BUN BLD-MCNC: 16 MG/DL (ref 8–23)
BUN/CREAT SERPL: 14.2 (ref 7–25)
CALCIUM SPEC-SCNC: 9.6 MG/DL (ref 8.6–10.5)
CHLORIDE SERPL-SCNC: 103 MMOL/L (ref 98–107)
CO2 SERPL-SCNC: 25 MMOL/L (ref 22–29)
CREAT BLD-MCNC: 1.13 MG/DL (ref 0.76–1.27)
GFR SERPL CREATININE-BSD FRML MDRD: 65 ML/MIN/1.73
GLUCOSE BLD-MCNC: 109 MG/DL (ref 65–99)
POTASSIUM BLD-SCNC: 4.4 MMOL/L (ref 3.5–5.2)
SODIUM BLD-SCNC: 139 MMOL/L (ref 136–145)

## 2020-01-03 PROCEDURE — 94729 DIFFUSING CAPACITY: CPT | Performed by: INTERNAL MEDICINE

## 2020-01-03 PROCEDURE — 36415 COLL VENOUS BLD VENIPUNCTURE: CPT

## 2020-01-03 PROCEDURE — 94727 GAS DIL/WSHOT DETER LNG VOL: CPT | Performed by: INTERNAL MEDICINE

## 2020-01-03 PROCEDURE — 94060 EVALUATION OF WHEEZING: CPT | Performed by: INTERNAL MEDICINE

## 2020-01-03 PROCEDURE — 80048 BASIC METABOLIC PNL TOTAL CA: CPT

## 2020-01-03 PROCEDURE — 75574 CT ANGIO HRT W/3D IMAGE: CPT

## 2020-01-03 PROCEDURE — 0 IOPAMIDOL PER 1 ML: Performed by: INTERNAL MEDICINE

## 2020-01-03 RX ORDER — METOPROLOL TARTRATE 5 MG/5ML
INJECTION INTRAVENOUS
Status: DISPENSED
Start: 2020-01-03 | End: 2020-01-03

## 2020-01-03 RX ORDER — METOPROLOL TARTRATE 5 MG/5ML
INJECTION INTRAVENOUS
Status: COMPLETED | OUTPATIENT
Start: 2020-01-03 | End: 2020-01-03

## 2020-01-03 RX ADMIN — IOPAMIDOL 90 ML: 755 INJECTION, SOLUTION INTRAVENOUS at 11:14

## 2020-01-03 RX ADMIN — METOPROLOL TARTRATE 5 MG: 5 INJECTION INTRAVENOUS at 11:00

## 2020-01-04 LAB
BH CV LOWER ARTERIAL LEFT ABI RATIO: 1.07
BH CV LOWER ARTERIAL LEFT DORSALIS PEDIS SYS MAX: 162 MMHG
BH CV LOWER ARTERIAL LEFT POST TIBIAL SYS MAX: 158 MMHG
BH CV LOWER ARTERIAL RIGHT ABI RATIO: 1.23
BH CV LOWER ARTERIAL RIGHT DORSALIS PEDIS SYS MAX: 186 MMHG
BH CV LOWER ARTERIAL RIGHT POST TIBIAL SYS MAX: 173 MMHG
UPPER ARTERIAL LEFT ARM BRACHIAL SYS MAX: 151 MMHG
UPPER ARTERIAL RIGHT ARM BRACHIAL SYS MAX: 142 MMHG

## 2020-01-07 DIAGNOSIS — R94.2 ABNORMAL PFTS (PULMONARY FUNCTION TESTS): ICD-10-CM

## 2020-01-07 DIAGNOSIS — J44.9 CHRONIC OBSTRUCTIVE PULMONARY DISEASE, UNSPECIFIED COPD TYPE (HCC): Primary | ICD-10-CM

## 2020-01-07 NOTE — PROGRESS NOTES
Coronary CTA results and KAREN results to patient. PFTs show moderate obstruction, referral placed for pulmonology

## 2020-01-14 ENCOUNTER — TELEPHONE (OUTPATIENT)
Dept: CARDIOLOGY | Facility: CLINIC | Age: 70
End: 2020-01-14

## 2020-01-28 PROBLEM — Z87.891 PERSONAL HISTORY OF TOBACCO USE, PRESENTING HAZARDS TO HEALTH: Status: ACTIVE | Noted: 2020-01-28
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
## 2020-01-30 ENCOUNTER — OFFICE VISIT (OUTPATIENT)
Dept: PULMONOLOGY | Facility: CLINIC | Age: 70
End: 2020-01-30

## 2020-01-30 VITALS
OXYGEN SATURATION: 97 % | DIASTOLIC BLOOD PRESSURE: 88 MMHG | WEIGHT: 174 LBS | SYSTOLIC BLOOD PRESSURE: 146 MMHG | HEIGHT: 66 IN | BODY MASS INDEX: 27.97 KG/M2 | HEART RATE: 98 BPM

## 2020-01-30 DIAGNOSIS — J44.9 STAGE 2 MODERATE COPD BY GOLD CLASSIFICATION (HCC): ICD-10-CM

## 2020-01-30 DIAGNOSIS — R94.2 ABNORMAL PFTS: Primary | ICD-10-CM

## 2020-01-30 DIAGNOSIS — J30.9 CHRONIC ALLERGIC RHINITIS: ICD-10-CM

## 2020-01-30 DIAGNOSIS — Z87.891 PERSONAL HISTORY OF TOBACCO USE, PRESENTING HAZARDS TO HEALTH: ICD-10-CM

## 2020-01-30 PROBLEM — J22 LOWER RESPIRATORY TRACT INFECTION: Status: RESOLVED | Noted: 2018-05-27 | Resolved: 2020-01-30

## 2020-01-30 PROCEDURE — G0296 VISIT TO DETERM LDCT ELIG: HCPCS | Performed by: INTERNAL MEDICINE

## 2020-01-30 PROCEDURE — 99204 OFFICE O/P NEW MOD 45 MIN: CPT | Performed by: INTERNAL MEDICINE

## 2020-01-30 RX ORDER — ECHINACEA PURPUREA EXTRACT 125 MG
1 TABLET ORAL AS NEEDED
COMMUNITY

## 2020-02-14 RX ORDER — PRAVASTATIN SODIUM 20 MG
20 TABLET ORAL NIGHTLY
Qty: 90 TABLET | Refills: 1 | Status: SHIPPED | OUTPATIENT
Start: 2020-02-14 | End: 2021-03-08 | Stop reason: SDUPTHER

## 2020-02-18 ENCOUNTER — OFFICE VISIT (OUTPATIENT)
Dept: FAMILY MEDICINE CLINIC | Facility: CLINIC | Age: 70
End: 2020-02-18

## 2020-02-18 VITALS
TEMPERATURE: 98.2 F | WEIGHT: 180.8 LBS | HEIGHT: 66 IN | HEART RATE: 77 BPM | OXYGEN SATURATION: 98 % | DIASTOLIC BLOOD PRESSURE: 78 MMHG | SYSTOLIC BLOOD PRESSURE: 126 MMHG | BODY MASS INDEX: 29.06 KG/M2

## 2020-02-18 DIAGNOSIS — M54.41 CHRONIC BILATERAL LOW BACK PAIN WITH BILATERAL SCIATICA: ICD-10-CM

## 2020-02-18 DIAGNOSIS — R06.09 DYSPNEA ON EXERTION: ICD-10-CM

## 2020-02-18 DIAGNOSIS — J44.9 STAGE 2 MODERATE COPD BY GOLD CLASSIFICATION (HCC): ICD-10-CM

## 2020-02-18 DIAGNOSIS — M54.42 CHRONIC BILATERAL LOW BACK PAIN WITH BILATERAL SCIATICA: ICD-10-CM

## 2020-02-18 DIAGNOSIS — M54.12 CHRONIC CERVICAL RADICULOPATHY: ICD-10-CM

## 2020-02-18 DIAGNOSIS — Z00.00 ROUTINE MEDICAL EXAM: ICD-10-CM

## 2020-02-18 DIAGNOSIS — R07.9 RIGHT-SIDED CHEST PAIN: ICD-10-CM

## 2020-02-18 DIAGNOSIS — G89.29 CHRONIC BILATERAL LOW BACK PAIN WITH BILATERAL SCIATICA: ICD-10-CM

## 2020-02-18 PROCEDURE — 99214 OFFICE O/P EST MOD 30 MIN: CPT | Performed by: FAMILY MEDICINE

## 2020-02-18 RX ORDER — GABAPENTIN 100 MG/1
100 CAPSULE ORAL 2 TIMES DAILY
Qty: 60 CAPSULE | Refills: 3 | Status: SHIPPED | OUTPATIENT
Start: 2020-02-18 | End: 2020-05-18 | Stop reason: SDUPTHER

## 2020-02-18 NOTE — PROGRESS NOTES
Subjective   Yehuda Rutledge is a 69 y.o. overweight white male former smoker with COPD, Allergies, H/O Lumbar back surgery, Lumbago/Sciatica, H/O ETOH abuse in remission, Abd Hernia (S/P incisional hernia repair), and other health problems see PMH/PSH. Pt presents for exam, to discuss acute health problem, Tx and F/U plans.     ' Fell 1 week ago on ice, caused some pain but doing OK now. Having flare with COPD, cough, some SOA. Have F/U scheduled with Pulmonologist and Cardiology. B/P has been OK, have been taking Lopressor usually just once daily. Neurontin helps with pain, need routine refill'.    Back Pain   This is a chronic problem. The current episode started more than 1 month ago. The problem occurs daily. The problem has been gradually improving since onset. The pain is present in the lumbar spine (Right Lumbar pain radiating down R lat leg. ). The quality of the pain is described as aching, shooting and stabbing. The pain radiates to the right thigh, right knee and right foot. The pain is at a severity of 1/10. The pain is mild. The symptoms are aggravated by standing, twisting and position. Stiffness is present all day. Associated symptoms include chest pain. Pertinent negatives include no abdominal pain, dysuria or fever. (Pain and numbness radiating down R outer leg) Risk factors include poor posture. He has tried analgesics, bed rest, heat, chiropractic manipulation, home exercises, muscle relaxant, NSAIDs, walking and ice for the symptoms. The treatment provided significant relief.   COPD   He complains of difficulty breathing, shortness of breath and wheezing. There is no cough. This is a chronic problem. The current episode started more than 1 year ago. The problem occurs daily. The problem has been unchanged. Associated symptoms include chest pain. Pertinent negatives include no fever, myalgias, postnasal drip or sore throat. His symptoms are aggravated by coughing, exertion and smoking. His  symptoms are alleviated by beta-agonist. He reports mild improvement on treatment. His past medical history is significant for COPD.   Neck Pain    This is a chronic problem. The current episode started more than 1 year ago. The problem occurs daily. The problem has been waxing and waning. The pain is associated with an unknown factor. The pain is at a severity of 4/10. The pain is moderate. The pain is worse during the night. Associated symptoms include chest pain. Pertinent negatives include no fever. He has tried acetaminophen, NSAIDs, muscle relaxants, chiropractic manipulation, home exercises and heat (Neurontin) for the symptoms. The treatment provided moderate relief.   Hyperlipidemia   This is a chronic problem. The current episode started more than 1 year ago. The problem is controlled. Recent lipid tests were reviewed and are variable. Factors aggravating his hyperlipidemia include smoking. Associated symptoms include chest pain and shortness of breath. Pertinent negatives include no myalgias. Current antihyperlipidemic treatment includes statins and diet change. The current treatment provides moderate improvement of lipids. Risk factors for coronary artery disease include male sex and dyslipidemia.   Shortness of Breath   This is a chronic problem. The current episode started more than 1 year ago. The problem occurs intermittently. The problem has been waxing and waning. Associated symptoms include chest pain, neck pain and wheezing. Pertinent negatives include no abdominal pain, fever, leg swelling or sore throat. The symptoms are aggravated by any activity. He has tried beta agonist inhalers for the symptoms. The treatment provided mild relief. His past medical history is significant for COPD.        The following portions of the patient's history were reviewed and updated as appropriate: allergies, current medications, past family history, past medical history, past social history, past surgical  history and problem list.    Review of Systems   Constitutional: Negative for chills, fatigue and fever.   HENT: Negative for congestion, facial swelling, mouth sores, postnasal drip, sinus pressure and sore throat.    Eyes: Negative for pain, discharge, itching and visual disturbance.   Respiratory: Positive for shortness of breath and wheezing. Negative for cough and chest tightness.    Cardiovascular: Positive for chest pain. Negative for palpitations and leg swelling.   Gastrointestinal: Negative for abdominal pain, blood in stool, constipation and diarrhea.        Abd pain resolved after hernia repair.   Endocrine: Negative.  Negative for cold intolerance and heat intolerance.   Genitourinary: Negative.  Negative for difficulty urinating, dysuria, hematuria and urgency.   Musculoskeletal: Positive for arthralgias, back pain and neck pain. Negative for myalgias.        Slipped on ice and fell 1 week ago  Neck and Back pain controlled with Neurontin   Skin: Negative.  Negative for color change, pallor and wound.   Allergic/Immunologic: Positive for environmental allergies. Negative for food allergies and immunocompromised state.   Neurological: Negative.  Negative for tremors and speech difficulty.   Hematological: Negative for adenopathy. Does not bruise/bleed easily.   Psychiatric/Behavioral: Negative.  Negative for agitation, dysphoric mood and sleep disturbance. The patient is not nervous/anxious and is not hyperactive.        Objective   Physical Exam   Constitutional: He is oriented to person, place, and time. He appears well-developed and well-nourished. No distress.   HENT:   Head: Normocephalic and atraumatic.   Right Ear: External ear normal.   Left Ear: External ear normal.   Nose: Nose normal.   Mouth/Throat: Oropharynx is clear and moist. No oropharyngeal exudate.        Eyes: Pupils are equal, round, and reactive to light. Conjunctivae and EOM are normal. Right eye exhibits no discharge. Left eye  exhibits no discharge. No scleral icterus.   Neck: Neck supple. No JVD present. No tracheal deviation present. No thyromegaly present.   Has Full cervical ROM, WB 2.   Cardiovascular: Normal rate, regular rhythm, normal heart sounds and intact distal pulses. Exam reveals no gallop and no friction rub.   No murmur heard.  Pulmonary/Chest: Effort normal and breath sounds normal. No stridor. No respiratory distress. He has no wheezes. He has no rales. He exhibits no tenderness.   No chest wall pain with palpation   Abdominal: Soft. Bowel sounds are normal. He exhibits no distension and no mass. There is no tenderness. There is no rebound and no guarding. No hernia.   Musculoskeletal: He exhibits no edema, tenderness or deformity.   WB 4 with ROM back.   Lymphadenopathy:     He has no cervical adenopathy.   Neurological: He is alert and oriented to person, place, and time. He has normal reflexes. He displays normal reflexes. No cranial nerve deficit or sensory deficit. He exhibits normal muscle tone. Coordination normal.   Skin: Skin is warm and dry. Capillary refill takes 2 to 3 seconds. No rash noted. He is not diaphoretic. No erythema. No pallor.   Psychiatric: He has a normal mood and affect. His behavior is normal. Judgment and thought content normal.   Nursing note and vitals reviewed.      Assessment/Plan   Yehuda was seen today for copd, hyperlipidemia, chest pain and back pain.    Diagnoses and all orders for this visit:    Chronic cervical radiculopathy  -     gabapentin (NEURONTIN) 100 MG capsule; Take 1 capsule by mouth 2 (Two) Times a Day.    Chronic bilateral low back pain with bilateral sciatica  -     gabapentin (NEURONTIN) 100 MG capsule; Take 1 capsule by mouth 2 (Two) Times a Day.    Stage 2 moderate COPD by GOLD classification (CMS/HCC)    Routine medical exam    Right-sided chest pain    Dyspnea on exertion    Discussed exam, health problems, meds, indications, tx plan, rationale. Discussed safety  with controlled med. Discussed F/U with specialist. Discussed F/u here.        This document has been electronically signed by Buzz Sifuentes MD

## 2020-02-18 NOTE — PATIENT INSTRUCTIONS
"DASH Eating Plan  DASH stands for \"Dietary Approaches to Stop Hypertension.\" The DASH eating plan is a healthy eating plan that has been shown to reduce high blood pressure (hypertension). It may also reduce your risk for type 2 diabetes, heart disease, and stroke. The DASH eating plan may also help with weight loss.  What are tips for following this plan?    General guidelines  · Avoid eating more than 2,300 mg (milligrams) of salt (sodium) a day. If you have hypertension, you may need to reduce your sodium intake to 1,500 mg a day.  · Limit alcohol intake to no more than 1 drink a day for nonpregnant women and 2 drinks a day for men. One drink equals 12 oz of beer, 5 oz of wine, or 1½ oz of hard liquor.  · Work with your health care provider to maintain a healthy body weight or to lose weight. Ask what an ideal weight is for you.  · Get at least 30 minutes of exercise that causes your heart to beat faster (aerobic exercise) most days of the week. Activities may include walking, swimming, or biking.  · Work with your health care provider or diet and nutrition specialist (dietitian) to adjust your eating plan to your individual calorie needs.  Reading food labels    · Check food labels for the amount of sodium per serving. Choose foods with less than 5 percent of the Daily Value of sodium. Generally, foods with less than 300 mg of sodium per serving fit into this eating plan.  · To find whole grains, look for the word \"whole\" as the first word in the ingredient list.  Shopping  · Buy products labeled as \"low-sodium\" or \"no salt added.\"  · Buy fresh foods. Avoid canned foods and premade or frozen meals.  Cooking  · Avoid adding salt when cooking. Use salt-free seasonings or herbs instead of table salt or sea salt. Check with your health care provider or pharmacist before using salt substitutes.  · Do not vergara foods. Cook foods using healthy methods such as baking, boiling, grilling, and broiling instead.  · Cook with " heart-healthy oils, such as olive, canola, soybean, or sunflower oil.  Meal planning  · Eat a balanced diet that includes:  ? 5 or more servings of fruits and vegetables each day. At each meal, try to fill half of your plate with fruits and vegetables.  ? Up to 6-8 servings of whole grains each day.  ? Less than 6 oz of lean meat, poultry, or fish each day. A 3-oz serving of meat is about the same size as a deck of cards. One egg equals 1 oz.  ? 2 servings of low-fat dairy each day.  ? A serving of nuts, seeds, or beans 5 times each week.  ? Heart-healthy fats. Healthy fats called Omega-3 fatty acids are found in foods such as flaxseeds and coldwater fish, like sardines, salmon, and mackerel.  · Limit how much you eat of the following:  ? Canned or prepackaged foods.  ? Food that is high in trans fat, such as fried foods.  ? Food that is high in saturated fat, such as fatty meat.  ? Sweets, desserts, sugary drinks, and other foods with added sugar.  ? Full-fat dairy products.  · Do not salt foods before eating.  · Try to eat at least 2 vegetarian meals each week.  · Eat more home-cooked food and less restaurant, buffet, and fast food.  · When eating at a restaurant, ask that your food be prepared with less salt or no salt, if possible.  What foods are recommended?  The items listed may not be a complete list. Talk with your dietitian about what dietary choices are best for you.  Grains  Whole-grain or whole-wheat bread. Whole-grain or whole-wheat pasta. Brown rice. Oatmeal. Quinoa. Bulgur. Whole-grain and low-sodium cereals. Shira bread. Low-fat, low-sodium crackers. Whole-wheat flour tortillas.  Vegetables  Fresh or frozen vegetables (raw, steamed, roasted, or grilled). Low-sodium or reduced-sodium tomato and vegetable juice. Low-sodium or reduced-sodium tomato sauce and tomato paste. Low-sodium or reduced-sodium canned vegetables.  Fruits  All fresh, dried, or frozen fruit. Canned fruit in natural juice (without  added sugar).  Meat and other protein foods  Skinless chicken or turkey. Ground chicken or turkey. Pork with fat trimmed off. Fish and seafood. Egg whites. Dried beans, peas, or lentils. Unsalted nuts, nut butters, and seeds. Unsalted canned beans. Lean cuts of beef with fat trimmed off. Low-sodium, lean deli meat.  Dairy  Low-fat (1%) or fat-free (skim) milk. Fat-free, low-fat, or reduced-fat cheeses. Nonfat, low-sodium ricotta or cottage cheese. Low-fat or nonfat yogurt. Low-fat, low-sodium cheese.  Fats and oils  Soft margarine without trans fats. Vegetable oil. Low-fat, reduced-fat, or light mayonnaise and salad dressings (reduced-sodium). Canola, safflower, olive, soybean, and sunflower oils. Avocado.  Seasoning and other foods  Herbs. Spices. Seasoning mixes without salt. Unsalted popcorn and pretzels. Fat-free sweets.  What foods are not recommended?  The items listed may not be a complete list. Talk with your dietitian about what dietary choices are best for you.  Grains  Baked goods made with fat, such as croissants, muffins, or some breads. Dry pasta or rice meal packs.  Vegetables  Creamed or fried vegetables. Vegetables in a cheese sauce. Regular canned vegetables (not low-sodium or reduced-sodium). Regular canned tomato sauce and paste (not low-sodium or reduced-sodium). Regular tomato and vegetable juice (not low-sodium or reduced-sodium). Pickles. Olives.  Fruits  Canned fruit in a light or heavy syrup. Fried fruit. Fruit in cream or butter sauce.  Meat and other protein foods  Fatty cuts of meat. Ribs. Fried meat. Chopra. Sausage. Bologna and other processed lunch meats. Salami. Fatback. Hotdogs. Bratwurst. Salted nuts and seeds. Canned beans with added salt. Canned or smoked fish. Whole eggs or egg yolks. Chicken or turkey with skin.  Dairy  Whole or 2% milk, cream, and half-and-half. Whole or full-fat cream cheese. Whole-fat or sweetened yogurt. Full-fat cheese. Nondairy creamers. Whipped toppings.  Processed cheese and cheese spreads.  Fats and oils  Butter. Stick margarine. Lard. Shortening. Ghee. Chopra fat. Tropical oils, such as coconut, palm kernel, or palm oil.  Seasoning and other foods  Salted popcorn and pretzels. Onion salt, garlic salt, seasoned salt, table salt, and sea salt. Worcestershire sauce. Tartar sauce. Barbecue sauce. Teriyaki sauce. Soy sauce, including reduced-sodium. Steak sauce. Canned and packaged gravies. Fish sauce. Oyster sauce. Cocktail sauce. Horseradish that you find on the shelf. Ketchup. Mustard. Meat flavorings and tenderizers. Bouillon cubes. Hot sauce and Tabasco sauce. Premade or packaged marinades. Premade or packaged taco seasonings. Relishes. Regular salad dressings.  Where to find more information:  · National Heart, Lung, and [employer] [place]: www.nhlbi.nih.gov  · American Heart Association: www.heart.org  Summary  · The DASH eating plan is a healthy eating plan that has been shown to reduce high blood pressure (hypertension). It may also reduce your risk for type 2 diabetes, heart disease, and stroke.  · With the DASH eating plan, you should limit salt (sodium) intake to 2,300 mg a day. If you have hypertension, you may need to reduce your sodium intake to 1,500 mg a day.  · When on the DASH eating plan, aim to eat more fresh fruits and vegetables, whole grains, lean proteins, low-fat dairy, and heart-healthy fats.  · Work with your health care provider or diet and nutrition specialist (dietitian) to adjust your eating plan to your individual calorie needs.  This information is not intended to replace advice given to you by your health care provider. Make sure you discuss any questions you have with your health care provider.  Document Released: 12/06/2012 Document Revised: 12/11/2017 Document Reviewed: 12/11/2017  [employer] Interactive Patient Education © 2019 [employer]

## 2020-05-14 ENCOUNTER — TELEPHONE (OUTPATIENT)
Dept: FAMILY MEDICINE CLINIC | Facility: CLINIC | Age: 70
End: 2020-05-14

## 2020-05-14 NOTE — TELEPHONE ENCOUNTER
Tried calling to confirm appointment and to remind patient to wear a mask and also need to prescreen.  No answer left voice message HUB to read

## 2020-05-18 ENCOUNTER — OFFICE VISIT (OUTPATIENT)
Dept: FAMILY MEDICINE CLINIC | Facility: CLINIC | Age: 70
End: 2020-05-18

## 2020-05-18 VITALS
SYSTOLIC BLOOD PRESSURE: 128 MMHG | HEIGHT: 66 IN | DIASTOLIC BLOOD PRESSURE: 78 MMHG | TEMPERATURE: 97.9 F | WEIGHT: 182.6 LBS | HEART RATE: 68 BPM | OXYGEN SATURATION: 98 % | BODY MASS INDEX: 29.35 KG/M2

## 2020-05-18 DIAGNOSIS — Z87.891 FORMER SMOKER: ICD-10-CM

## 2020-05-18 DIAGNOSIS — M54.41 CHRONIC BILATERAL LOW BACK PAIN WITH BILATERAL SCIATICA: ICD-10-CM

## 2020-05-18 DIAGNOSIS — Z91.09 ENVIRONMENTAL ALLERGIES: ICD-10-CM

## 2020-05-18 DIAGNOSIS — J44.9 STAGE 2 MODERATE COPD BY GOLD CLASSIFICATION (HCC): ICD-10-CM

## 2020-05-18 DIAGNOSIS — G89.29 CHRONIC BILATERAL LOW BACK PAIN WITH BILATERAL SCIATICA: ICD-10-CM

## 2020-05-18 DIAGNOSIS — Z79.899 HIGH RISK MEDICATION USE: ICD-10-CM

## 2020-05-18 DIAGNOSIS — M54.42 CHRONIC BILATERAL LOW BACK PAIN WITH BILATERAL SCIATICA: ICD-10-CM

## 2020-05-18 DIAGNOSIS — M54.12 CHRONIC CERVICAL RADICULOPATHY: ICD-10-CM

## 2020-05-18 DIAGNOSIS — F51.01 PRIMARY INSOMNIA: ICD-10-CM

## 2020-05-18 LAB
AMPHET+METHAMPHET UR QL: NEGATIVE
AMPHETAMINES UR QL: NEGATIVE
BARBITURATES UR QL SCN: NEGATIVE
BENZODIAZ UR QL SCN: NEGATIVE
BUPRENORPHINE SERPL-MCNC: NEGATIVE NG/ML
CANNABINOIDS SERPL QL: NEGATIVE
COCAINE UR QL: NEGATIVE
METHADONE UR QL SCN: NEGATIVE
OPIATES UR QL: NEGATIVE
OXYCODONE UR QL SCN: NEGATIVE
PCP UR QL SCN: NEGATIVE
PROPOXYPH UR QL: NEGATIVE
TRICYCLICS UR QL SCN: NEGATIVE

## 2020-05-18 PROCEDURE — 99214 OFFICE O/P EST MOD 30 MIN: CPT | Performed by: FAMILY MEDICINE

## 2020-05-18 PROCEDURE — 80306 DRUG TEST PRSMV INSTRMNT: CPT | Performed by: FAMILY MEDICINE

## 2020-05-18 RX ORDER — GABAPENTIN 100 MG/1
100 CAPSULE ORAL 2 TIMES DAILY
Qty: 60 CAPSULE | Refills: 3 | Status: SHIPPED | OUTPATIENT
Start: 2020-05-18 | End: 2020-05-18 | Stop reason: SDUPTHER

## 2020-05-18 RX ORDER — GABAPENTIN 100 MG/1
100 CAPSULE ORAL 2 TIMES DAILY
Qty: 60 CAPSULE | Refills: 3 | Status: SHIPPED | OUTPATIENT
Start: 2020-05-18 | End: 2020-09-10 | Stop reason: SDUPTHER

## 2020-05-18 NOTE — PATIENT INSTRUCTIONS
Health Maintenance After Age 65  After age 65, you are at a higher risk for certain long-term diseases and infections as well as injuries from falls. Falls are a major cause of broken bones and head injuries in people who are older than age 65. Getting regular preventive care can help to keep you healthy and well. Preventive care includes getting regular testing and making lifestyle changes as recommended by your health care provider. Talk with your health care provider about:  · Which screenings and tests you should have. A screening is a test that checks for a disease when you have no symptoms.  · A diet and exercise plan that is right for you.  What should I know about screenings and tests to prevent falls?  Screening and testing are the best ways to find a health problem early. Early diagnosis and treatment give you the best chance of managing medical conditions that are common after age 65. Certain conditions and lifestyle choices may make you more likely to have a fall. Your health care provider may recommend:  · Regular vision checks. Poor vision and conditions such as cataracts can make you more likely to have a fall. If you wear glasses, make sure to get your prescription updated if your vision changes.  · Medicine review. Work with your health care provider to regularly review all of the medicines you are taking, including over-the-counter medicines. Ask your health care provider about any side effects that may make you more likely to have a fall. Tell your health care provider if any medicines that you take make you feel dizzy or sleepy.  · Osteoporosis screening. Osteoporosis is a condition that causes the bones to get weaker. This can make the bones weak and cause them to break more easily.  · Blood pressure screening. Blood pressure changes and medicines to control blood pressure can make you feel dizzy.  · Strength and balance checks. Your health care provider may recommend certain tests to check your  strength and balance while standing, walking, or changing positions.  · Foot health exam. Foot pain and numbness, as well as not wearing proper footwear, can make you more likely to have a fall.  · Depression screening. You may be more likely to have a fall if you have a fear of falling, feel emotionally low, or feel unable to do activities that you used to do.  · Alcohol use screening. Using too much alcohol can affect your balance and may make you more likely to have a fall.  What actions can I take to lower my risk of falls?  General instructions  · Talk with your health care provider about your risks for falling. Tell your health care provider if:  ? You fall. Be sure to tell your health care provider about all falls, even ones that seem minor.  ? You feel dizzy, sleepy, or off-balance.  · Take over-the-counter and prescription medicines only as told by your health care provider. These include any supplements.  · Eat a healthy diet and maintain a healthy weight. A healthy diet includes low-fat dairy products, low-fat (lean) meats, and fiber from whole grains, beans, and lots of fruits and vegetables.  Home safety  · Remove any tripping hazards, such as rugs, cords, and clutter.  · Install safety equipment such as grab bars in bathrooms and safety rails on stairs.  · Keep rooms and walkways well-lit.  Activity    · Follow a regular exercise program to stay fit. This will help you maintain your balance. Ask your health care provider what types of exercise are appropriate for you.  · If you need a cane or walker, use it as recommended by your health care provider.  · Wear supportive shoes that have nonskid soles.  Lifestyle  · Do not drink alcohol if your health care provider tells you not to drink.  · If you drink alcohol, limit how much you have:  ? 0-1 drink a day for women.  ? 0-2 drinks a day for men.  · Be aware of how much alcohol is in your drink. In the [place] one drink equals one typical bottle of beer (12  oz), one-half glass of wine (5 oz), or one shot of hard liquor (1½ oz).  · Do not use any products that contain nicotine or tobacco, such as cigarettes and e-cigarettes. If you need help quitting, ask your health care provider.  Summary  · Having a healthy lifestyle and getting preventive care can help to protect your health and wellness after age 65.  · Screening and testing are the best way to find a health problem early and help you avoid having a fall. Early diagnosis and treatment give you the best chance for managing medical conditions that are more common for people who are older than age 65.  · Falls are a major cause of broken bones and head injuries in people who are older than age 65. Take precautions to prevent a fall at home.  · Work with your health care provider to learn what changes you can make to improve your health and wellness and to prevent falls.  This information is not intended to replace advice given to you by your health care provider. Make sure you discuss any questions you have with your health care provider.  Document Released: 10/31/2018 Document Revised: 10/31/2018 Document Reviewed: 10/31/2018  [employer] Interactive Patient Education © 2020 [employer]

## 2020-05-18 NOTE — PROGRESS NOTES
Subjective   Yehuda Rutledge is a 69 y.o. overweight white male former smoker with COPD, Allergies, H/O Lumbar back surgery, Lumbago/Sciatica, H/O ETOH abuse in remission, Abd Hernia (S/P incisional hernia repair), and other health problems see PMH/PSH. Pt presents for exam, to discuss health problem, Tx and F/U plans.     ' L shoulder still hurts some after fall earlier in year, but slowly improving. Breathing has improved.  B/P has been good at checks. Neurontin helps with pain, need routine refill'.      Back Pain   This is a chronic problem. The current episode started more than 1 month ago. The problem occurs daily. The problem has been gradually improving since onset. The pain is present in the lumbar spine (Right Lumbar pain radiating down R lat leg. ). The quality of the pain is described as aching, shooting and stabbing. The pain radiates to the right thigh, right knee and right foot. The pain is at a severity of 2/10. The pain is mild. The symptoms are aggravated by standing, twisting and position. Stiffness is present all day. Pertinent negatives include no abdominal pain, chest pain, dysuria or fever. (Pain and numbness radiating down R outer leg) Risk factors include poor posture. He has tried analgesics, bed rest, heat, chiropractic manipulation, home exercises, muscle relaxant, NSAIDs, walking and ice for the symptoms. The treatment provided significant relief.   COPD   He complains of difficulty breathing. There is no cough, shortness of breath or wheezing. This is a chronic problem. The current episode started more than 1 year ago. The problem occurs daily. The problem has been unchanged. Pertinent negatives include no chest pain, fever, myalgias, postnasal drip or sore throat. His symptoms are aggravated by coughing, exertion and smoking. His symptoms are alleviated by beta-agonist. He reports mild improvement on treatment.   Neck Pain    This is a chronic problem. The current episode started more  than 1 year ago. The problem occurs daily. The problem has been waxing and waning. The pain is associated with an unknown factor. The pain is at a severity of 4/10. The pain is moderate. The pain is worse during the night. Pertinent negatives include no chest pain or fever. He has tried acetaminophen, NSAIDs, muscle relaxants, chiropractic manipulation, home exercises and heat (Neurontin) for the symptoms. The treatment provided moderate relief.   Hyperlipidemia   This is a chronic problem. The current episode started more than 1 year ago. The problem is controlled. Recent lipid tests were reviewed and are variable. Factors aggravating his hyperlipidemia include smoking. Pertinent negatives include no chest pain, myalgias or shortness of breath. Current antihyperlipidemic treatment includes statins and diet change. The current treatment provides moderate improvement of lipids. Risk factors for coronary artery disease include male sex and dyslipidemia.        The following portions of the patient's history were reviewed and updated as appropriate: allergies, current medications, past family history, past medical history, past social history, past surgical history and problem list.    Review of Systems   Constitutional: Negative for chills, fatigue and fever.   HENT: Negative for congestion, facial swelling, mouth sores, postnasal drip, sinus pressure and sore throat.    Eyes: Negative for pain, discharge, itching and visual disturbance.   Respiratory: Negative for cough, chest tightness, shortness of breath and wheezing.    Cardiovascular: Negative for chest pain, palpitations and leg swelling.   Gastrointestinal: Negative for abdominal pain, blood in stool, constipation and diarrhea.        Abd pain resolved after hernia repair.   Endocrine: Negative.  Negative for cold intolerance and heat intolerance.   Genitourinary: Negative.  Negative for difficulty urinating, dysuria, hematuria and urgency.   Musculoskeletal:  Positive for arthralgias, back pain and neck pain. Negative for myalgias.        Slipped on ice and fell 1 week ago  Neck and Back pain controlled with Neurontin   Skin: Negative.  Negative for color change, pallor and wound.   Allergic/Immunologic: Positive for environmental allergies. Negative for food allergies and immunocompromised state.   Neurological: Negative.  Negative for tremors and speech difficulty.   Hematological: Negative for adenopathy. Does not bruise/bleed easily.   Psychiatric/Behavioral: Negative.  Negative for agitation, dysphoric mood and sleep disturbance. The patient is not nervous/anxious and is not hyperactive.        Objective   Physical Exam   Constitutional: He is oriented to person, place, and time. He appears well-developed and well-nourished. No distress.   HENT:   Head: Normocephalic and atraumatic.   Right Ear: External ear normal.   Left Ear: External ear normal.   Nose: Nose normal.   Mouth/Throat: Oropharynx is clear and moist. No oropharyngeal exudate.        Eyes: Pupils are equal, round, and reactive to light. Conjunctivae and EOM are normal. Right eye exhibits no discharge. Left eye exhibits no discharge. No scleral icterus.   Neck: Neck supple. No JVD present. No tracheal deviation present. No thyromegaly present.   Has Full cervical ROM, WB 2.   Cardiovascular: Normal rate, regular rhythm, normal heart sounds and intact distal pulses. Exam reveals no gallop and no friction rub.   No murmur heard.  Pulmonary/Chest: Effort normal and breath sounds normal. No stridor. No respiratory distress. He has no wheezes. He has no rales. He exhibits no tenderness.   Abdominal: Soft. Bowel sounds are normal. He exhibits no distension and no mass. There is no tenderness. There is no rebound and no guarding. No hernia.   Musculoskeletal: He exhibits no edema, tenderness or deformity.   WB 4 with ROM back.   Lymphadenopathy:     He has no cervical adenopathy.   Neurological: He is alert  and oriented to person, place, and time. He has normal reflexes. He displays normal reflexes. No cranial nerve deficit or sensory deficit. He exhibits normal muscle tone. Coordination normal.   Skin: Skin is warm and dry. Capillary refill takes 2 to 3 seconds. No rash noted. He is not diaphoretic. No erythema. No pallor.   Psychiatric: He has a normal mood and affect. His behavior is normal. Judgment and thought content normal.   Nursing note and vitals reviewed.      Assessment/Plan   Yehuda was seen today for copd, back pain and shoulder pain.    Diagnoses and all orders for this visit:    Chronic cervical radiculopathy  -     Discontinue: gabapentin (NEURONTIN) 100 MG capsule; Take 1 capsule by mouth 2 (Two) Times a Day.  -     gabapentin (NEURONTIN) 100 MG capsule; Take 1 capsule by mouth 2 (Two) Times a Day.    Chronic bilateral low back pain with bilateral sciatica  -     Discontinue: gabapentin (NEURONTIN) 100 MG capsule; Take 1 capsule by mouth 2 (Two) Times a Day.  -     gabapentin (NEURONTIN) 100 MG capsule; Take 1 capsule by mouth 2 (Two) Times a Day.    Stage 2 moderate COPD by GOLD classification (CMS/HCC)    Primary insomnia    High risk medication use    Former smoker    Environmental allergies    Discussed exam, health problems, meds, indications, tx plan, rationale. Discussed USPSTF recommendations. Discussed safety with controlled med. Discussed F/U with specialist. Discussed f/u here.        This document has been electronically signed by Buzz Sifuentes MD on May 18, 2020 09:59      .

## 2020-07-07 ENCOUNTER — OFFICE VISIT (OUTPATIENT)
Dept: FAMILY MEDICINE CLINIC | Facility: CLINIC | Age: 70
End: 2020-07-07

## 2020-07-07 VITALS
BODY MASS INDEX: 28.94 KG/M2 | HEIGHT: 66 IN | OXYGEN SATURATION: 98 % | HEART RATE: 66 BPM | WEIGHT: 180.1 LBS | SYSTOLIC BLOOD PRESSURE: 132 MMHG | TEMPERATURE: 98.3 F | DIASTOLIC BLOOD PRESSURE: 78 MMHG

## 2020-07-07 DIAGNOSIS — Z00.00 MEDICARE ANNUAL WELLNESS VISIT, SUBSEQUENT: Primary | ICD-10-CM

## 2020-07-07 DIAGNOSIS — Z23 NEED FOR VACCINATION: ICD-10-CM

## 2020-07-07 PROCEDURE — G0439 PPPS, SUBSEQ VISIT: HCPCS | Performed by: FAMILY MEDICINE

## 2020-07-07 PROCEDURE — G0009 ADMIN PNEUMOCOCCAL VACCINE: HCPCS | Performed by: FAMILY MEDICINE

## 2020-07-07 PROCEDURE — 90732 PPSV23 VACC 2 YRS+ SUBQ/IM: CPT | Performed by: FAMILY MEDICINE

## 2020-07-07 NOTE — PATIENT INSTRUCTIONS
Fall Prevention in the Home, Adult  Falls can cause injuries and can affect people from all age groups. There are many simple things that you can do to make your home safe and to help prevent falls. Ask for help when making these changes, if needed.  What actions can I take to prevent falls?  General instructions  · Use good lighting in all rooms. Replace any light bulbs that burn out.  · Turn on lights if it is dark. Use night-lights.  · Place frequently used items in easy-to-reach places. Lower the shelves around your home if necessary.  · Set up furniture so that there are clear paths around it. Avoid moving your furniture around.  · Remove throw rugs and other tripping hazards from the floor.  · Avoid walking on wet floors.  · Fix any uneven floor surfaces.  · Add color or contrast paint or tape to grab bars and handrails in your home. Place contrasting color strips on the first and last steps of stairways.  · When you use a stepladder, make sure that it is completely opened and that the sides are firmly locked. Have someone hold the ladder while you are using it. Do not climb a closed stepladder.  · Be aware of any and all pets.  What can I do in the bathroom?         · Keep the floor dry. Immediately clean up any water that spills onto the floor.  · Remove soap buildup in the tub or shower on a regular basis.  · Use non-skid mats or decals on the floor of the tub or shower.  · Attach bath mats securely with double-sided, non-slip rug tape.  · If you need to sit down while you are in the shower, use a plastic, non-slip stool.  · Install grab bars by the toilet and in the tub and shower. Do not use towel bars as grab bars.  What can I do in the bedroom?  · Make sure that a bedside light is easy to reach.  · Do not use oversized bedding that drapes onto the floor.  · Have a firm chair that has side arms to use for getting dressed.  What can I do in the kitchen?  · Clean up any spills right away.  · If you need to  reach for something above you, use a sturdy step stool that has a grab bar.  · Keep electrical cables out of the way.  · Do not use floor polish or wax that makes floors slippery. If you must use wax, make sure that it is non-skid floor wax.  What can I do in the stairways?  · Do not leave any items on the stairs.  · Make sure that you have a light switch at the top of the stairs and the bottom of the stairs. Have them installed if you do not have them.  · Make sure that there are handrails on both sides of the stairs. Fix handrails that are broken or loose. Make sure that handrails are as long as the stairways.  · Install non-slip stair treads on all stairs in your home.  · Avoid having throw rugs at the top or bottom of stairways, or secure the rugs with carpet tape to prevent them from moving.  · Choose a carpet design that does not hide the edge of steps on the stairway.  · Check any carpeting to make sure that it is firmly attached to the stairs. Fix any carpet that is loose or worn.  What can I do on the outside of my home?  · Use bright outdoor lighting.  · Regularly repair the edges of walkways and driveways and fix any cracks.  · Remove high doorway thresholds.  · Trim any shrubbery on the main path into your home.  · Regularly check that handrails are securely fastened and in good repair. Both sides of any steps should have handrails.  · Install guardrails along the edges of any raised decks or porches.  · Clear walkways of debris and clutter, including tools and rocks.  · Have leaves, snow, and ice cleared regularly.  · Use sand or salt on walkways during winter months.  · In the garage, clean up any spills right away, including grease or oil spills.  What other actions can I take?  · Wear closed-toe shoes that fit well and support your feet. Wear shoes that have rubber soles or low heels.  · Use mobility aids as needed, such as canes, walkers, scooters, and crutches.  · Review your medicines with your  health care provider. Some medicines can cause dizziness or changes in blood pressure, which increase your risk of falling.  Talk with your health care provider about other ways that you can decrease your risk of falls. This may include working with a physical therapist or  to improve your strength, balance, and endurance.  Where to find more information  · Centers for Disease Control and Prevention, STEADI: https://www.cdc.gov  · National Cucumber on Aging: https://cc9ijqu.miles.nih.gov  Contact a health care provider if:  · You are afraid of falling at home.  · You feel weak, drowsy, or dizzy at home.  · You fall at home.  Summary  · There are many simple things that you can do to make your home safe and to help prevent falls.  · Ways to make your home safe include removing tripping hazards and installing grab bars in the bathroom.  · Ask for help when making these changes in your home.  This information is not intended to replace advice given to you by your health care provider. Make sure you discuss any questions you have with your health care provider.  Document Released: 12/08/2003 Document Revised: 11/30/2018 Document Reviewed: 08/02/2018  [employer] Patient Education © 2020 [employer]  Exercising to Stay Healthy  To become healthy and stay healthy, it is recommended that you do moderate-intensity and vigorous-intensity exercise. You can tell that you are exercising at a moderate intensity if your heart starts beating faster and you start breathing faster but can still hold a conversation. You can tell that you are exercising at a vigorous intensity if you are breathing much harder and faster and cannot hold a conversation while exercising.  Exercising regularly is important. It has many health benefits, such as:  · Improving overall fitness, flexibility, and endurance.  · Increasing bone density.  · Helping with weight control.  · Decreasing body fat.  · Increasing muscle strength.  · Reducing stress  and tension.  · Improving overall health.  How often should I exercise?  Choose an activity that you enjoy, and set realistic goals. Your health care provider can help you make an activity plan that works for you.  Exercise regularly as told by your health care provider. This may include:  · Doing strength training two times a week, such as:  ? Lifting weights.  ? Using resistance bands.  ? Push-ups.  ? Sit-ups.  ? Yoga.  · Doing a certain intensity of exercise for a given amount of time. Choose from these options:  ? A total of 150 minutes of moderate-intensity exercise every week.  ? A total of 75 minutes of vigorous-intensity exercise every week.  ? A mix of moderate-intensity and vigorous-intensity exercise every week.  Children, pregnant women, people who have not exercised regularly, people who are overweight, and older adults may need to talk with a health care provider about what activities are safe to do. If you have a medical condition, be sure to talk with your health care provider before you start a new exercise program.  What are some exercise ideas?  Moderate-intensity exercise ideas include:  · Walking 1 mile (1.6 km) in about 15 minutes.  · Biking.  · Hiking.  · Golfing.  · Dancing.  · Water aerobics.  Vigorous-intensity exercise ideas include:  · Walking 4.5 miles (7.2 km) or more in about 1 hour.  · Jogging or running 5 miles (8 km) in about 1 hour.  · Biking 10 miles (16.1 km) or more in about 1 hour.  · Lap swimming.  · Roller-skating or in-line skating.  · Cross-country skiing.  · Vigorous competitive sports, such as football, basketball, and soccer.  · Jumping rope.  · Aerobic dancing.  What are some everyday activities that can help me to get exercise?  · Yard work, such as:  ? Pushing a .  ? Raking and bagging leaves.  · Washing your car.  · Pushing a stroller.  · Shoveling snow.  · Gardening.  · Washing windows or floors.  How can I be more active in my day-to-day  activities?  · Use stairs instead of an elevator.  · Take a walk during your lunch break.  · If you drive, park your car farther away from your work or school.  · If you take public transportation, get off one stop early and walk the rest of the way.  · Stand up or walk around during all of your indoor phone calls.  · Get up, stretch, and walk around every 30 minutes throughout the day.  · Enjoy exercise with a friend. Support to continue exercising will help you keep a regular routine of activity.  What guidelines can I follow while exercising?  · Before you start a new exercise program, talk with your health care provider.  · Do not exercise so much that you hurt yourself, feel dizzy, or get very short of breath.  · Wear comfortable clothes and wear shoes with good support.  · Drink plenty of water while you exercise to prevent dehydration or heat stroke.  · Work out until your breathing and your heartbeat get faster.  Where to find more information  · [place] Department of Health and Human Services: www.hhs.gov  · Centers for Disease Control and Prevention (CDC): www.cdc.gov  Summary  · Exercising regularly is important. It will improve your overall fitness, flexibility, and endurance.  · Regular exercise also will improve your overall health. It can help you control your weight, reduce stress, and improve your bone density.  · Do not exercise so much that you hurt yourself, feel dizzy, or get very short of breath.  · Before you start a new exercise program, talk with your health care provider.  This information is not intended to replace advice given to you by your health care provider. Make sure you discuss any questions you have with your health care provider.  Document Released: 01/20/2012 Document Revised: 11/30/2018 Document Reviewed: 11/08/2018  [employer] Patient Education © 2020 [employer]    Medicare Wellness  Personal Prevention Plan of Service     Date of Office Visit:  07/07/2020  Encounter Provider:   Buzz Sifuentes MD  Place of Service:  [facility]  Patient Name: Yehuda Rutledge  :  1950    As part of the Medicare Wellness portion of your visit today, we are providing you with this personalized preventive plan of services (PPPS). This plan is based upon recommendations of the [place] Preventive Services Task Force (USPSTF) and the Advisory Committee on Immunization Practices (ACIP).    This lists the preventive care services that should be considered, and provides dates of when you are due. Items listed as completed are up-to-date and do not require any further intervention.    Health Maintenance   Topic Date Due   • Pneumococcal Vaccine Once at 65 Years Old  2017   • ZOSTER VACCINE (2 of 2) 2018   • LUNG CANCER SCREENING  2020   • LIPID PANEL  2020   • TDAP/TD VACCINES (1 - Tdap) 2025 (Originally 1961)   • INFLUENZA VACCINE  2020   • MEDICARE ANNUAL WELLNESS  2021   • COLONOSCOPY  2025   • HEPATITIS C SCREENING  Completed   • AAA SCREEN (ONE-TIME)  Completed       No orders of the defined types were placed in this encounter.      Return in about 1 year (around 2021) for Medicare Wellness subsequent.

## 2020-07-07 NOTE — PROGRESS NOTES
The ABCs of the Annual Wellness Visit  Subsequent Medicare Wellness Visit    Chief Complaint   Patient presents with   • Annual Exam     Medicare Wellness Exam       Subjective   History of Present Illness:  Yehuda Rutledge is a 69 y.o. male who presents for a Subsequent Medicare Wellness Visit.    HEALTH RISK ASSESSMENT    Recent Hospitalizations:  No hospitalization(s) within the last year.    Current Medical Providers:  Patient Care Team:  Buzz Sifuentes MD as PCP - General  Buzz Sifuentes MD as PCP - Claims Attributed  Sincere Santamaria OD (Optometry)    Smoking Status:  Social History     Tobacco Use   Smoking Status Former Smoker   • Packs/day: 1.00   • Years: 40.00   • Pack years: 40.00   • Types: Cigarettes   • Last attempt to quit: 2017   • Years since quittin.6   Smokeless Tobacco Never Used       Alcohol Consumption:  Social History     Substance and Sexual Activity   Alcohol Use No       Depression Screen:   PHQ-2/PHQ-9 Depression Screening 2020   Little interest or pleasure in doing things 0   Feeling down, depressed, or hopeless 0   Total Score 0       Fall Risk Screen:  STEADI Fall Risk Assessment was completed, and patient is at MODERATE risk for falls. Assessment completed on:2020    Health Habits and Functional and Cognitive Screening:  Functional & Cognitive Status 2020   Do you have difficulty preparing food and eating? No   Do you have difficulty bathing yourself, getting dressed or grooming yourself? No   Do you have difficulty using the toilet? No   Do you have difficulty moving around from place to place? No   Do you have trouble with steps or getting out of a bed or a chair? No   Current Diet Well Balanced Diet   Dental Exam Not up to date   Eye Exam Not up to date   Exercise (times per week) 7 times per week   Current Exercise Activities Include Walking   Do you need help using the phone?  No   Are you deaf or do you have serious difficulty hearing?   No   Do you need help with transportation? -   Do you need help shopping? No   Do you need help preparing meals?  No   Do you need help with housework?  No   Do you need help with laundry? No   Do you need help taking your medications? No   Do you need help managing money? No   Do you ever drive or ride in a car without wearing a seat belt? No   Have you felt unusual stress, anger or loneliness in the last month? No   Who do you live with? Alone   If you need help, do you have trouble finding someone available to you? No   Have you been bothered in the last four weeks by sexual problems? No   Do you have difficulty concentrating, remembering or making decisions? No         Does the patient have evidence of cognitive impairment? No    Asprin use counseling:Taking ASA appropriately as indicated    Age-appropriate Screening Schedule:  Refer to the list below for future screening recommendations based on patient's age, sex and/or medical conditions. Orders for these recommended tests are listed in the plan section. The patient has been provided with a written plan.    Health Maintenance   Topic Date Due   • LIPID PANEL  05/23/2020   • ZOSTER VACCINE (2 of 2) 07/07/2020 (Originally 5/16/2018)   • TDAP/TD VACCINES (1 - Tdap) 07/31/2025 (Originally 8/19/1961)   • INFLUENZA VACCINE  08/01/2020   • COLONOSCOPY  12/18/2025          The following portions of the patient's history were reviewed and updated as appropriate: allergies, current medications, past family history, past medical history, past social history, past surgical history and problem list.    Outpatient Medications Prior to Visit   Medication Sig Dispense Refill   • albuterol (PROVENTIL HFA;VENTOLIN HFA) 108 (90 BASE) MCG/ACT inhaler Inhale 2 puffs Every 4 (Four) Hours As Needed for Shortness of Air or Wheezing. 2 puffs by  Mouth every 4-6 hours       • aspirin 81 MG EC tablet Take 81 mg by mouth daily.     • cetirizine (zyrTEC) 10 MG tablet Take 1 tablet by  mouth Daily. 30 tablet 5   • fluticasone (FLONASE) 50 MCG/ACT nasal spray 2 sprays into the nostril(s) as directed by provider Daily. Administer 2 sprays in each nostril for each dose.      • gabapentin (NEURONTIN) 100 MG capsule Take 1 capsule by mouth 2 (Two) Times a Day. 60 capsule 3   • guaiFENesin (MUCINEX PO) Take  by mouth.     • meloxicam (MOBIC) 15 MG tablet TAKE 1 TABLET BY MOUTH EVERY DAY 90 tablet 1   • metoprolol tartrate (LOPRESSOR) 25 MG tablet Take 1 tablet by mouth 2 (Two) Times a Day. 180 tablet 3   • Misc Natural Products (OSTEO BI-FLEX JOINT SHIELD PO) Take 1 tablet by mouth Daily.     • Misc Natural Products (PROSTATE HEALTH) capsule Take 1 capsule by mouth 2 (Two) Times a Day.     • Multiple Vitamins-Minerals (MULTIVITAMIN ADULT PO) Take 1 tablet by mouth Daily.     • Omega-3 Fatty Acids (FISH OIL) 1000 MG capsule capsule Take 1,000 mg by mouth Daily With Breakfast.     • pravastatin (PRAVACHOL) 20 MG tablet Take 1 tablet by mouth Every Night. 90 tablet 1   • sodium chloride 0.65 % nasal spray 1 spray into the nostril(s) as directed by provider As Needed.     • brompheniramine-pseudoephedrine-DM 30-2-10 MG/5ML syrup Take 5 mL by mouth 3 (Three) Times a Day As Needed for Congestion, Cough or Allergies. May take as needed with Zyrtec as needed 118 mL 1     No facility-administered medications prior to visit.        Patient Active Problem List   Diagnosis   • Simple chronic bronchitis (CMS/HCC)   • Environmental allergies   • Right-sided low back pain with right-sided sciatica   • Overweight (BMI 25.0-29.9)   • Lower respiratory infection   • Stage 2 moderate COPD by GOLD classification (CMS/HCC)   • Adenopathy, cervical   • High risk medication use   • Umbilical hernia without obstruction and without gangrene   • Chronic cervical radiculopathy   • Incisional hernia, without obstruction or gangrene   • Diabetes mellitus screening   • Hyperlipidemia   • Hyperglycemia   • Polyuria   • H/O umbilical  "hernia repair   • Former smoker   • Arthritis   • Actinic keratosis   • Routine medical exam   • Foot pain, bilateral   • Insomnia   • Bilateral foot pain   • Lumbago with sciatica   • Chronic fatigue   • Dyspnea on exertion   • Right-sided chest pain   • Personal history of tobacco use, presenting hazards to health   • Chronic allergic rhinitis       Advanced Care Planning:  ACP discussion was held with the patient during this visit. Patient does not have an advance directive, information provided.    Review of Systems    Compared to one year ago, the patient feels his physical health is the same.  Compared to one year ago, the patient feels his mental health is the same.    Reviewed chart for potential of high risk medication in the elderly: yes  Reviewed chart for potential of harmful drug interactions in the elderly:yes    Objective         Vitals:    07/07/20 0909   BP: 132/78   Pulse: 66   Temp: 98.3 °F (36.8 °C)   TempSrc: Tympanic   SpO2: 98%   Weight: 81.7 kg (180 lb 1.6 oz)   Height: 167.6 cm (66\")   PainSc: 0-No pain       Body mass index is 29.07 kg/m².  Discussed the patient's BMI with him. The BMI is above average; BMI management plan is completed.    Physical Exam          Assessment/Plan   Medicare Risks and Personalized Health Plan  CMS Preventative Services Quick Reference  Abdominal Aortic Aneurysm Screening  Advance Directive Discussion  Chronic Pain   Fall Risk  Lung Cancer Risk  Obesity/Overweight     The above risks/problems have been discussed with the patient.  Pertinent information has been shared with the patient in the After Visit Summary.  Follow up plans and orders are seen below in the Assessment/Plan Section.    Diagnoses and all orders for this visit:    1. Medicare annual wellness visit, subsequent (Primary)    2. Need for vaccination  -     Pneumococcal Polysaccharide Vaccine 23-Valent (PPSV23) Greater Than or Equal To 3yo Subcutaneous / IM      Follow Up:  Return in about 1 year " (around 7/7/2021) for Medicare Wellness subsequent.     An After Visit Summary and PPPS were given to the patient.     Action plan for improved health reviewed at visit. Pt declined Shingrix, Low dose CT lung screening.         This document has been electronically signed by Buzz Sifuentes MD on July 7, 2020

## 2020-09-10 ENCOUNTER — OFFICE VISIT (OUTPATIENT)
Dept: FAMILY MEDICINE CLINIC | Facility: CLINIC | Age: 70
End: 2020-09-10

## 2020-09-10 VITALS
BODY MASS INDEX: 28.37 KG/M2 | TEMPERATURE: 98.6 F | WEIGHT: 176.5 LBS | SYSTOLIC BLOOD PRESSURE: 136 MMHG | DIASTOLIC BLOOD PRESSURE: 70 MMHG | HEART RATE: 69 BPM | OXYGEN SATURATION: 97 % | HEIGHT: 66 IN

## 2020-09-10 DIAGNOSIS — M54.41 CHRONIC BILATERAL LOW BACK PAIN WITH BILATERAL SCIATICA: ICD-10-CM

## 2020-09-10 DIAGNOSIS — R05.9 COUGH: ICD-10-CM

## 2020-09-10 DIAGNOSIS — M54.12 CHRONIC CERVICAL RADICULOPATHY: ICD-10-CM

## 2020-09-10 DIAGNOSIS — Z00.00 ROUTINE CHECK-UP: ICD-10-CM

## 2020-09-10 DIAGNOSIS — M54.42 CHRONIC BILATERAL LOW BACK PAIN WITH BILATERAL SCIATICA: ICD-10-CM

## 2020-09-10 DIAGNOSIS — G89.29 CHRONIC BILATERAL LOW BACK PAIN WITH BILATERAL SCIATICA: ICD-10-CM

## 2020-09-10 DIAGNOSIS — Z23 NEED FOR IMMUNIZATION AGAINST INFLUENZA: ICD-10-CM

## 2020-09-10 PROCEDURE — 90694 VACC AIIV4 NO PRSRV 0.5ML IM: CPT | Performed by: FAMILY MEDICINE

## 2020-09-10 PROCEDURE — G0008 ADMIN INFLUENZA VIRUS VAC: HCPCS | Performed by: FAMILY MEDICINE

## 2020-09-10 PROCEDURE — 99214 OFFICE O/P EST MOD 30 MIN: CPT | Performed by: FAMILY MEDICINE

## 2020-09-10 RX ORDER — PREDNISONE 10 MG/1
10 TABLET ORAL SEE ADMIN INSTRUCTIONS
Qty: 17 TABLET | Refills: 0 | Status: SHIPPED | OUTPATIENT
Start: 2020-09-10 | End: 2020-10-26

## 2020-09-10 RX ORDER — GABAPENTIN 100 MG/1
100 CAPSULE ORAL 2 TIMES DAILY
Qty: 60 CAPSULE | Refills: 3 | Status: SHIPPED | OUTPATIENT
Start: 2020-09-10 | End: 2020-12-04 | Stop reason: SDUPTHER

## 2020-09-10 RX ORDER — AZITHROMYCIN 250 MG/1
TABLET, FILM COATED ORAL
Qty: 6 TABLET | Refills: 0 | Status: SHIPPED | OUTPATIENT
Start: 2020-09-10 | End: 2020-10-26

## 2020-09-12 PROBLEM — R05.9 COUGH: Status: ACTIVE | Noted: 2020-09-12

## 2020-09-12 PROBLEM — Z00.00 ROUTINE CHECK-UP: Status: ACTIVE | Noted: 2020-09-12

## 2020-09-12 PROBLEM — Z23 NEED FOR IMMUNIZATION AGAINST INFLUENZA: Status: ACTIVE | Noted: 2020-09-12

## 2020-09-13 NOTE — PROGRESS NOTES
Subjective    Yehuda Rutledge is a 70 y.o. overweight white male former smoker with COPD, Allergies, H/O Lumbar back surgery, Lumbago/Sciatica, H/O ETOH abuse in remission, Abd Hernia (S/P incisional hernia repair), and other health problems see PMH/PSH. Pt presents for exam, to discuss acute and chronic health problem, Tx and F/U plans.    ' Have cough, sinus drainage, body aches, low grade temp off and on x 1-2 weeks. Other problems stable'    Neck Pain   This is a chronic problem. The current episode started more than 1 year ago. The problem occurs daily. The problem has been waxing and waning. The pain is associated with an unknown factor. The pain is at a severity of 4/10. The pain is moderate. The pain is worse during the night. Pertinent negatives include no chest pain or fever. He has tried acetaminophen, NSAIDs, muscle relaxants, chiropractic manipulation, home exercises and heat (Neurontin) for the symptoms. The treatment provided moderate relief.   Hyperlipidemia  This is a chronic problem. The current episode started more than 1 year ago. The problem is controlled. Recent lipid tests were reviewed and are variable. Factors aggravating his hyperlipidemia include smoking. Pertinent negatives include no chest pain, myalgias or shortness of breath. Current antihyperlipidemic treatment includes statins and diet change. The current treatment provides moderate improvement of lipids. Risk factors for coronary artery disease include male sex and dyslipidemia.   COPD  He complains of cough and difficulty breathing. There is no shortness of breath or wheezing. This is a chronic problem. The current episode started more than 1 year ago. The problem occurs daily. The problem has been unchanged. Pertinent negatives include no chest pain, fever, myalgias, postnasal drip or sore throat. His symptoms are aggravated by coughing, exertion and smoking. His symptoms are alleviated by beta-agonist. He reports mild  improvement on treatment. His past medical history is significant for bronchitis, COPD and pneumonia.   Allergies  Associated symptoms include arthralgias, chills, coughing and neck pain. Pertinent negatives include no abdominal pain, chest pain, congestion, fatigue, fever, myalgias or sore throat.   Eye Problem   Pertinent negatives include no eye discharge, fever or itching.   Cough  This is a new problem. The current episode started 1 to 4 weeks ago. The problem has been waxing and waning. The cough is productive of sputum. Associated symptoms include chills. Pertinent negatives include no chest pain, fever, myalgias, postnasal drip, sore throat, shortness of breath or wheezing. Nothing aggravates the symptoms. He has tried rest and OTC cough suppressant for the symptoms. His past medical history is significant for bronchitis, COPD, environmental allergies and pneumonia.   Back Pain  This is a chronic problem. The current episode started more than 1 month ago. The problem occurs daily. The problem has been gradually improving since onset. The pain is present in the lumbar spine (Right Lumbar pain radiating down R lat leg. ). The quality of the pain is described as aching, shooting and stabbing. The pain radiates to the right thigh, right knee and right foot. The pain is at a severity of 2/10. The pain is mild. The symptoms are aggravated by standing, twisting and position. Stiffness is present all day. Pertinent negatives include no abdominal pain, chest pain, dysuria or fever. (Pain and numbness radiating down R outer leg) Risk factors include poor posture. He has tried analgesics, bed rest, heat, chiropractic manipulation, home exercises, muscle relaxant, NSAIDs, walking and ice for the symptoms. The treatment provided significant relief.        The following portions of the patient's history were reviewed and updated as appropriate: allergies, current medications, past family history, past medical history,  past social history, past surgical history and problem list.    Review of Systems   Constitutional: Positive for chills. Negative for fatigue and fever.   HENT: Negative for congestion, facial swelling, mouth sores, postnasal drip, sinus pressure and sore throat.    Eyes: Negative for pain, discharge, itching and visual disturbance.   Respiratory: Positive for cough. Negative for chest tightness, shortness of breath and wheezing.    Cardiovascular: Negative for chest pain, palpitations and leg swelling.   Gastrointestinal: Negative for abdominal pain, blood in stool, constipation and diarrhea.        Abd pain resolved after hernia repair.   Endocrine: Negative.  Negative for cold intolerance and heat intolerance.   Genitourinary: Negative.  Negative for difficulty urinating, dysuria, hematuria and urgency.   Musculoskeletal: Positive for arthralgias, back pain and neck pain. Negative for myalgias.        Neck and Back pain controlled with Neurontin   Skin: Negative.  Negative for color change, pallor and wound.   Allergic/Immunologic: Positive for environmental allergies. Negative for food allergies and immunocompromised state.   Neurological: Negative.  Negative for tremors and speech difficulty.   Hematological: Negative for adenopathy. Does not bruise/bleed easily.   Psychiatric/Behavioral: Negative.  Negative for agitation, dysphoric mood and sleep disturbance. The patient is not nervous/anxious and is not hyperactive.        Objective   Physical Exam  Vitals signs and nursing note reviewed.   HENT:      Right Ear: Tympanic membrane normal.      Left Ear: Tympanic membrane normal.      Nose: Congestion and rhinorrhea present.      Mouth/Throat:      Mouth: Mucous membranes are moist.      Pharynx: Posterior oropharyngeal erythema present. No oropharyngeal exudate.   Eyes:      Conjunctiva/sclera: Conjunctivae normal.      Pupils: Pupils are equal, round, and reactive to light.   Neck:      Musculoskeletal:  Muscular tenderness present.   Cardiovascular:      Rate and Rhythm: Normal rate and regular rhythm.      Heart sounds: No murmur. No gallop.    Pulmonary:      Effort: Pulmonary effort is normal.      Breath sounds: Normal breath sounds.   Abdominal:      General: Bowel sounds are normal.      Palpations: Abdomen is soft.      Tenderness: There is no abdominal tenderness.   Musculoskeletal: Normal range of motion.   Skin:     General: Skin is warm and dry.      Capillary Refill: Capillary refill takes 2 to 3 seconds.      Findings: No erythema.   Neurological:      Cranial Nerves: No cranial nerve deficit.   Psychiatric:         Mood and Affect: Mood normal.         Behavior: Behavior normal.         Thought Content: Thought content normal.         Judgment: Judgment normal.         Assessment/Plan   Yehuda was seen today for neck pain, hyperlipidemia, copd, allergies, ear problem and eye problem.    Diagnoses and all orders for this visit:    Chronic cervical radiculopathy  -     gabapentin (NEURONTIN) 100 MG capsule; Take 1 capsule by mouth 2 (Two) Times a Day.    Chronic bilateral low back pain with bilateral sciatica  -     gabapentin (NEURONTIN) 100 MG capsule; Take 1 capsule by mouth 2 (Two) Times a Day.    Routine check-up  -     Comprehensive metabolic panel; Future  -     CBC & Differential; Future  -     TSH; Future  -     Urinalysis With Culture If Indicated -; Future  -     Lipid Panel; Future    Need for immunization against influenza  -     Fluad Quad 65+ yrs ([id number])    Cough  -     azithromycin (Zithromax) 250 MG tablet; Take 2 tablets the first day, then 1 tablet daily for 4 days.  -     predniSONE (DELTASONE) 10 MG tablet; Take 1 tablet by mouth See Admin Instructions. Take 1 Tab Tid x3 days, Then 1 Tab Bid x 2 days Then 1 Tab QD x4 days,then D/C    Discussed exam, health problems, meds, indications, Tx plan, rationale. Discussed safety with controlled med.  Discussed F/u with specialist.  Discussed F/u here.        This document has been electronically signed by Buzz Sifuentes MD

## 2020-09-13 NOTE — PATIENT INSTRUCTIONS
Calorie Counting for Weight Loss  Calories are units of energy. Your body needs a certain amount of calories from food to keep you going throughout the day. When you eat more calories than your body needs, your body stores the extra calories as fat. When you eat fewer calories than your body needs, your body burns fat to get the energy it needs.  Calorie counting means keeping track of how many calories you eat and drink each day. Calorie counting can be helpful if you need to lose weight. If you make sure to eat fewer calories than your body needs, you should lose weight. Ask your health care provider what a healthy weight is for you.  For calorie counting to work, you will need to eat the right number of calories in a day in order to lose a healthy amount of weight per week. A dietitian can help you determine how many calories you need in a day and will give you suggestions on how to reach your calorie goal.  · A healthy amount of weight to lose per week is usually 1-2 lb (0.5-0.9 kg). This usually means that your daily calorie intake should be reduced by 500-750 calories.  · Eating 1,200 - 1,500 calories per day can help most women lose weight.  · Eating 1,500 - 1,800 calories per day can help most men lose weight.  What is my plan?  My goal is to have __________ calories per day.  If I have this many calories per day, I should lose around __________ pounds per week.  What do I need to know about calorie counting?  In order to meet your daily calorie goal, you will need to:  · Find out how many calories are in each food you would like to eat. Try to do this before you eat.  · Decide how much of the food you plan to eat.  · Write down what you ate and how many calories it had. Doing this is called keeping a food log.  To successfully lose weight, it is important to balance calorie counting with a healthy lifestyle that includes regular activity. Aim for 150 minutes of moderate exercise (such as walking) or 75  minutes of vigorous exercise (such as running) each week.  Where do I find calorie information?    The number of calories in a food can be found on a Nutrition Facts label. If a food does not have a Nutrition Facts label, try to look up the calories online or ask your dietitian for help.  Remember that calories are listed per serving. If you choose to have more than one serving of a food, you will have to multiply the calories per serving by the amount of servings you plan to eat. For example, the label on a package of bread might say that a serving size is 1 slice and that there are 90 calories in a serving. If you eat 1 slice, you will have eaten 90 calories. If you eat 2 slices, you will have eaten 180 calories.  How do I keep a food log?  Immediately after each meal, record the following information in your food log:  · What you ate. Don't forget to include toppings, sauces, and other extras on the food.  · How much you ate. This can be measured in cups, ounces, or number of items.  · How many calories each food and drink had.  · The total number of calories in the meal.  Keep your food log near you, such as in a small notebook in your pocket, or use a mobile kateryna or website. Some programs will calculate calories for you and show you how many calories you have left for the day to meet your goal.  What are some calorie counting tips?    · Use your calories on foods and drinks that will fill you up and not leave you hungry:  ? Some examples of foods that fill you up are nuts and nut butters, vegetables, lean proteins, and high-fiber foods like whole grains. High-fiber foods are foods with more than 5 g fiber per serving.  ? Drinks such as sodas, specialty coffee drinks, alcohol, and juices have a lot of calories, yet do not fill you up.  · Eat nutritious foods and avoid empty calories. Empty calories are calories you get from foods or beverages that do not have many vitamins or protein, such as candy, sweets, and  "soda. It is better to have a nutritious high-calorie food (such as an avocado) than a food with few nutrients (such as a bag of chips).  · Know how many calories are in the foods you eat most often. This will help you calculate calorie counts faster.  · Pay attention to calories in drinks. Low-calorie drinks include water and unsweetened drinks.  · Pay attention to nutrition labels for \"low fat\" or \"fat free\" foods. These foods sometimes have the same amount of calories or more calories than the full fat versions. They also often have added sugar, starch, or salt, to make up for flavor that was removed with the fat.  · Find a way of tracking calories that works for you. Get creative. Try different apps or programs if writing down calories does not work for you.  What are some portion control tips?  · Know how many calories are in a serving. This will help you know how many servings of a certain food you can have.  · Use a measuring cup to measure serving sizes. You could also try weighing out portions on a kitchen scale. With time, you will be able to estimate serving sizes for some foods.  · Take some time to put servings of different foods on your favorite plates, bowls, and cups so you know what a serving looks like.  · Try not to eat straight from a bag or box. Doing this can lead to overeating. Put the amount you would like to eat in a cup or on a plate to make sure you are eating the right portion.  · Use smaller plates, glasses, and bowls to prevent overeating.  · Try not to multitask (for example, watch TV or use your computer) while eating. If it is time to eat, sit down at a table and enjoy your food. This will help you to know when you are full. It will also help you to be aware of what you are eating and how much you are eating.  What are tips for following this plan?  Reading food labels  · Check the calorie count compared to the serving size. The serving size may be smaller than what you are used to " "eating.  · Check the source of the calories. Make sure the food you are eating is high in vitamins and protein and low in saturated and trans fats.  Shopping  · Read nutrition labels while you shop. This will help you make healthy decisions before you decide to purchase your food.  · Make a grocery list and stick to it.  Cooking  · Try to cook your favorite foods in a healthier way. For example, try baking instead of frying.  · Use low-fat dairy products.  Meal planning  · Use more fruits and vegetables. Half of your plate should be fruits and vegetables.  · Include lean proteins like poultry and fish.  How do I count calories when eating out?  · Ask for smaller portion sizes.  · Consider sharing an entree and sides instead of getting your own entree.  · If you get your own entree, eat only half. Ask for a box at the beginning of your meal and put the rest of your entree in it so you are not tempted to eat it.  · If calories are listed on the menu, choose the lower calorie options.  · Choose dishes that include vegetables, fruits, whole grains, low-fat dairy products, and lean protein.  · Choose items that are boiled, broiled, grilled, or steamed. Stay away from items that are buttered, battered, fried, or served with cream sauce. Items labeled \"crispy\" are usually fried, unless stated otherwise.  · Choose water, low-fat milk, unsweetened iced tea, or other drinks without added sugar. If you want an alcoholic beverage, choose a lower calorie option such as a glass of wine or light beer.  · Ask for dressings, sauces, and syrups on the side. These are usually high in calories, so you should limit the amount you eat.  · If you want a salad, choose a garden salad and ask for grilled meats. Avoid extra toppings like barrett, cheese, or fried items. Ask for the dressing on the side, or ask for olive oil and vinegar or lemon to use as dressing.  · Estimate how many servings of a food you are given. For example, a serving of " cooked rice is ½ cup or about the size of half a baseball. Knowing serving sizes will help you be aware of how much food you are eating at restaurants. The list below tells you how big or small some common portion sizes are based on everyday objects:  ? 1 oz--4 stacked dice.  ? 3 oz--1 deck of cards.  ? 1 tsp--1 die.  ? 1 Tbsp--½ a ping-pong ball.  ? 2 Tbsp--1 ping-pong ball.  ? ½ cup--½ baseball.  ? 1 cup--1 baseball.  Summary  · Calorie counting means keeping track of how many calories you eat and drink each day. If you eat fewer calories than your body needs, you should lose weight.  · A healthy amount of weight to lose per week is usually 1-2 lb (0.5-0.9 kg). This usually means reducing your daily calorie intake by 500-750 calories.  · The number of calories in a food can be found on a Nutrition Facts label. If a food does not have a Nutrition Facts label, try to look up the calories online or ask your dietitian for help.  · Use your calories on foods and drinks that will fill you up, and not on foods and drinks that will leave you hungry.  · Use smaller plates, glasses, and bowls to prevent overeating.  This information is not intended to replace advice given to you by your health care provider. Make sure you discuss any questions you have with your health care provider.  Document Released: 12/18/2006 Document Revised: 09/06/2019 Document Reviewed: 11/17/2017  [employer] Patient Education © 2020 [employer]      Health Maintenance After Age 65  After age 65, you are at a higher risk for certain long-term diseases and infections as well as injuries from falls. Falls are a major cause of broken bones and head injuries in people who are older than age 65. Getting regular preventive care can help to keep you healthy and well. Preventive care includes getting regular testing and making lifestyle changes as recommended by your health care provider. Talk with your health care provider about:  · Which screenings and  tests you should have. A screening is a test that checks for a disease when you have no symptoms.  · A diet and exercise plan that is right for you.  What should I know about screenings and tests to prevent falls?  Screening and testing are the best ways to find a health problem early. Early diagnosis and treatment give you the best chance of managing medical conditions that are common after age 65. Certain conditions and lifestyle choices may make you more likely to have a fall. Your health care provider may recommend:  · Regular vision checks. Poor vision and conditions such as cataracts can make you more likely to have a fall. If you wear glasses, make sure to get your prescription updated if your vision changes.  · Medicine review. Work with your health care provider to regularly review all of the medicines you are taking, including over-the-counter medicines. Ask your health care provider about any side effects that may make you more likely to have a fall. Tell your health care provider if any medicines that you take make you feel dizzy or sleepy.  · Osteoporosis screening. Osteoporosis is a condition that causes the bones to get weaker. This can make the bones weak and cause them to break more easily.  · Blood pressure screening. Blood pressure changes and medicines to control blood pressure can make you feel dizzy.  · Strength and balance checks. Your health care provider may recommend certain tests to check your strength and balance while standing, walking, or changing positions.  · Foot health exam. Foot pain and numbness, as well as not wearing proper footwear, can make you more likely to have a fall.  · Depression screening. You may be more likely to have a fall if you have a fear of falling, feel emotionally low, or feel unable to do activities that you used to do.  · Alcohol use screening. Using too much alcohol can affect your balance and may make you more likely to have a fall.  What actions can I  take to lower my risk of falls?  General instructions  · Talk with your health care provider about your risks for falling. Tell your health care provider if:  ? You fall. Be sure to tell your health care provider about all falls, even ones that seem minor.  ? You feel dizzy, sleepy, or off-balance.  · Take over-the-counter and prescription medicines only as told by your health care provider. These include any supplements.  · Eat a healthy diet and maintain a healthy weight. A healthy diet includes low-fat dairy products, low-fat (lean) meats, and fiber from whole grains, beans, and lots of fruits and vegetables.  Home safety  · Remove any tripping hazards, such as rugs, cords, and clutter.  · Install safety equipment such as grab bars in bathrooms and safety rails on stairs.  · Keep rooms and walkways well-lit.  Activity    · Follow a regular exercise program to stay fit. This will help you maintain your balance. Ask your health care provider what types of exercise are appropriate for you.  · If you need a cane or walker, use it as recommended by your health care provider.  · Wear supportive shoes that have nonskid soles.  Lifestyle  · Do not drink alcohol if your health care provider tells you not to drink.  · If you drink alcohol, limit how much you have:  ? 0-1 drink a day for women.  ? 0-2 drinks a day for men.  · Be aware of how much alcohol is in your drink. In the [place] one drink equals one typical bottle of beer (12 oz), one-half glass of wine (5 oz), or one shot of hard liquor (1½ oz).  · Do not use any products that contain nicotine or tobacco, such as cigarettes and e-cigarettes. If you need help quitting, ask your health care provider.  Summary  · Having a healthy lifestyle and getting preventive care can help to protect your health and wellness after age 65.  · Screening and testing are the best way to find a health problem early and help you avoid having a fall. Early diagnosis and treatment give you  the best chance for managing medical conditions that are more common for people who are older than age 65.  · Falls are a major cause of broken bones and head injuries in people who are older than age 65. Take precautions to prevent a fall at home.  · Work with your health care provider to learn what changes you can make to improve your health and wellness and to prevent falls.  This information is not intended to replace advice given to you by your health care provider. Make sure you discuss any questions you have with your health care provider.  Document Released: 10/31/2018 Document Revised: 04/09/2020 Document Reviewed: 10/31/2018  [employer] Patient Education © 2020 [employer]

## 2020-09-25 ENCOUNTER — LAB (OUTPATIENT)
Dept: LAB | Facility: HOSPITAL | Age: 70
End: 2020-09-25

## 2020-09-25 DIAGNOSIS — Z00.00 ROUTINE CHECK-UP: ICD-10-CM

## 2020-09-25 PROCEDURE — 81003 URINALYSIS AUTO W/O SCOPE: CPT

## 2020-09-25 PROCEDURE — 85025 COMPLETE CBC W/AUTO DIFF WBC: CPT

## 2020-09-25 PROCEDURE — 80053 COMPREHEN METABOLIC PANEL: CPT

## 2020-09-25 PROCEDURE — 80061 LIPID PANEL: CPT

## 2020-09-25 PROCEDURE — 84443 ASSAY THYROID STIM HORMONE: CPT

## 2020-09-26 LAB
ALBUMIN SERPL-MCNC: 4.1 G/DL (ref 3.5–5.2)
ALBUMIN/GLOB SERPL: 1.1 G/DL
ALP SERPL-CCNC: 98 U/L (ref 39–117)
ALT SERPL W P-5'-P-CCNC: 25 U/L (ref 1–41)
ANION GAP SERPL CALCULATED.3IONS-SCNC: 10.8 MMOL/L (ref 5–15)
AST SERPL-CCNC: 19 U/L (ref 1–40)
BASOPHILS # BLD AUTO: 0.05 10*3/MM3 (ref 0–0.2)
BASOPHILS NFR BLD AUTO: 0.5 % (ref 0–1.5)
BILIRUB SERPL-MCNC: 0.3 MG/DL (ref 0–1.2)
BILIRUB UR QL STRIP: NEGATIVE
BUN SERPL-MCNC: 11 MG/DL (ref 8–23)
BUN/CREAT SERPL: 8.5 (ref 7–25)
CALCIUM SPEC-SCNC: 10.2 MG/DL (ref 8.6–10.5)
CHLORIDE SERPL-SCNC: 100 MMOL/L (ref 98–107)
CHOLEST SERPL-MCNC: 182 MG/DL (ref 0–200)
CLARITY UR: CLEAR
CO2 SERPL-SCNC: 24.2 MMOL/L (ref 22–29)
COLOR UR: YELLOW
CREAT SERPL-MCNC: 1.29 MG/DL (ref 0.76–1.27)
DEPRECATED RDW RBC AUTO: 43.1 FL (ref 37–54)
EOSINOPHIL # BLD AUTO: 0.15 10*3/MM3 (ref 0–0.4)
EOSINOPHIL NFR BLD AUTO: 1.5 % (ref 0.3–6.2)
ERYTHROCYTE [DISTWIDTH] IN BLOOD BY AUTOMATED COUNT: 14.8 % (ref 12.3–15.4)
GFR SERPL CREATININE-BSD FRML MDRD: 55 ML/MIN/1.73
GLOBULIN UR ELPH-MCNC: 3.8 GM/DL
GLUCOSE SERPL-MCNC: 97 MG/DL (ref 65–99)
GLUCOSE UR STRIP-MCNC: NEGATIVE MG/DL
HCT VFR BLD AUTO: 38.1 % (ref 37.5–51)
HDLC SERPL-MCNC: 36 MG/DL (ref 40–60)
HGB BLD-MCNC: 12.7 G/DL (ref 13–17.7)
HGB UR QL STRIP.AUTO: NEGATIVE
IMM GRANULOCYTES # BLD AUTO: 0.03 10*3/MM3 (ref 0–0.05)
IMM GRANULOCYTES NFR BLD AUTO: 0.3 % (ref 0–0.5)
KETONES UR QL STRIP: NEGATIVE
LDLC SERPL CALC-MCNC: 72 MG/DL (ref 0–100)
LDLC/HDLC SERPL: 2.01 {RATIO}
LEUKOCYTE ESTERASE UR QL STRIP.AUTO: NEGATIVE
LYMPHOCYTES # BLD AUTO: 3.8 10*3/MM3 (ref 0.7–3.1)
LYMPHOCYTES NFR BLD AUTO: 37.9 % (ref 19.6–45.3)
MCH RBC QN AUTO: 26.9 PG (ref 26.6–33)
MCHC RBC AUTO-ENTMCNC: 33.3 G/DL (ref 31.5–35.7)
MCV RBC AUTO: 80.7 FL (ref 79–97)
MONOCYTES # BLD AUTO: 0.67 10*3/MM3 (ref 0.1–0.9)
MONOCYTES NFR BLD AUTO: 6.7 % (ref 5–12)
NEUTROPHILS NFR BLD AUTO: 5.33 10*3/MM3 (ref 1.7–7)
NEUTROPHILS NFR BLD AUTO: 53.1 % (ref 42.7–76)
NITRITE UR QL STRIP: NEGATIVE
NRBC BLD AUTO-RTO: 0.1 /100 WBC (ref 0–0.2)
PH UR STRIP.AUTO: 6.5 [PH] (ref 5–8)
PLATELET # BLD AUTO: 279 10*3/MM3 (ref 140–450)
PMV BLD AUTO: 10.9 FL (ref 6–12)
POTASSIUM SERPL-SCNC: 5 MMOL/L (ref 3.5–5.2)
PROT SERPL-MCNC: 7.9 G/DL (ref 6–8.5)
PROT UR QL STRIP: NEGATIVE
RBC # BLD AUTO: 4.72 10*6/MM3 (ref 4.14–5.8)
SODIUM SERPL-SCNC: 135 MMOL/L (ref 136–145)
SP GR UR STRIP: <=1.005 (ref 1–1.03)
TRIGL SERPL-MCNC: 369 MG/DL (ref 0–150)
TSH SERPL DL<=0.05 MIU/L-ACNC: 2.24 UIU/ML (ref 0.27–4.2)
UROBILINOGEN UR QL STRIP: NORMAL
VLDLC SERPL-MCNC: 73.8 MG/DL (ref 5–40)
WBC # BLD AUTO: 10.03 10*3/MM3 (ref 3.4–10.8)

## 2020-10-07 ENCOUNTER — TELEPHONE (OUTPATIENT)
Dept: FAMILY MEDICINE CLINIC | Facility: CLINIC | Age: 70
End: 2020-10-07

## 2020-10-26 ENCOUNTER — OFFICE VISIT (OUTPATIENT)
Dept: CARDIOLOGY | Facility: CLINIC | Age: 70
End: 2020-10-26

## 2020-10-26 VITALS
DIASTOLIC BLOOD PRESSURE: 70 MMHG | WEIGHT: 174 LBS | SYSTOLIC BLOOD PRESSURE: 120 MMHG | HEART RATE: 70 BPM | BODY MASS INDEX: 27.97 KG/M2 | HEIGHT: 66 IN | OXYGEN SATURATION: 99 %

## 2020-10-26 DIAGNOSIS — I10 HYPERTENSION, ESSENTIAL: Primary | ICD-10-CM

## 2020-10-26 DIAGNOSIS — E78.2 HYPERLIPEMIA, MIXED: ICD-10-CM

## 2020-10-26 PROCEDURE — 99215 OFFICE O/P EST HI 40 MIN: CPT | Performed by: INTERNAL MEDICINE

## 2020-10-26 NOTE — PROGRESS NOTES
[facility]  OFFICE NOTE    Cardiovascular Medicine  Ava Colin M.D., RPVI         No referring provider defined for this encounter.    Thank you for asking me to see Yehuda Rutledge for chest pain.    History of Present Illness  This is a 70 y.o. male with:    1.  Elevated blood pressure diagnosis of hypertension #  2 hyperlipidemia  3.  Chronic lung disease  4.  Neuropathy  5.  Chest pain  6. CKD    Yehuda Rutledge is a 70 y.o. male who presents for consultation today.  Patient is here for further evaluation for shortness of breath, this is new in onset over the last several months, he reported he will get short of breath on mild exertion, he has good days and bad days he has noticed it particularly after meals he is short of breath after walking for 5 minutes.  He has to sit down to catch his breath.  Times on exertion he is also having heaviness which is across his chest, which resolves when he is resting as well.  Again is not happening every time he is exerting but he has good days and bad days.  He is denying orthopnea PND lower extremity swelling or palpitations.  No prior history myocardial infarction.  He does have elevated blood pressure in office without diagnosis of hypertension.  He also has hyperlipidemia and is a previous smoker.  Denies any family history premature coronary artery disease    10/26/2020:  No acute issues since last visit.  Denied any further episodes of chest pain.  Shortness of breath is improved after being seen by pulmonology and has been started on inhalers.        Review of Systems - ROS  Constitution: Negative for weakness, weight gain and weight loss.   HENT: Negative for congestion.    Eyes: Negative for blurred vision.   Cardiovascular: As mentioned above  Respiratory: Negative for cough and hemoptysis.    Endocrine: Negative for polydipsia and polyuria.   Hematologic/Lymphatic: Negative for bleeding problem. Does not bruise/bleed easily.    Skin: Negative for flushing.   Musculoskeletal: Negative for neck pain and stiffness.   Gastrointestinal: Negative for abdominal pain, diarrhea, jaundice, melena, nausea and vomiting.   Genitourinary: Negative for dysuria and hematuria.   Neurological: Negative for dizziness, focal weakness and numbness.   Psychiatric/Behavioral: Negative for altered mental status and depression.          All other systems were reviewed and were negative.    family history includes Diabetes in his mother.     reports that he quit smoking about 2 years ago. His smoking use included cigarettes. He has a 40.00 pack-year smoking history. He has never used smokeless tobacco. He reports that he does not drink alcohol or use drugs.    Allergies   Allergen Reactions   • Penicillins Rash         Current Outpatient Medications:   •  albuterol (PROVENTIL HFA;VENTOLIN HFA) 108 (90 BASE) MCG/ACT inhaler, Inhale 2 puffs Every 4 (Four) Hours As Needed for Shortness of Air or Wheezing. 2 puffs by  Mouth every 4-6 hours  , Disp: , Rfl:   •  aspirin 81 MG EC tablet, Take 81 mg by mouth daily., Disp: , Rfl:   •  cetirizine (zyrTEC) 10 MG tablet, Take 1 tablet by mouth Daily., Disp: 30 tablet, Rfl: 5  •  fluticasone (FLONASE) 50 MCG/ACT nasal spray, 2 sprays into the nostril(s) as directed by provider Daily. Administer 2 sprays in each nostril for each dose. , Disp: , Rfl:   •  gabapentin (NEURONTIN) 100 MG capsule, Take 1 capsule by mouth 2 (Two) Times a Day., Disp: 60 capsule, Rfl: 3  •  guaiFENesin (MUCINEX PO), Take  by mouth., Disp: , Rfl:   •  metoprolol tartrate (LOPRESSOR) 25 MG tablet, Take 1 tablet by mouth 2 (Two) Times a Day., Disp: 180 tablet, Rfl: 3  •  Misc Natural Products (OSTEO BI-FLEX JOINT SHIELD PO), Take 1 tablet by mouth Daily., Disp: , Rfl:   •  Misc Natural Products (PROSTATE HEALTH) capsule, Take 1 capsule by mouth 2 (Two) Times a Day., Disp: , Rfl:   •  Multiple Vitamins-Minerals (MULTIVITAMIN ADULT PO), Take 1 tablet by  "mouth Daily., Disp: , Rfl:   •  Omega-3 Fatty Acids (FISH OIL) 1000 MG capsule capsule, Take 1,000 mg by mouth Daily With Breakfast., Disp: , Rfl:   •  pravastatin (PRAVACHOL) 20 MG tablet, Take 1 tablet by mouth Every Night., Disp: 90 tablet, Rfl: 1  •  sodium chloride 0.65 % nasal spray, 1 spray into the nostril(s) as directed by provider As Needed., Disp: , Rfl:     Physical Exam:  Vitals:    10/26/20 1059   BP: 120/70   BP Location: Right arm   Patient Position: Sitting   Cuff Size: Adult   Pulse: 70   SpO2: 99%   Weight: 78.9 kg (174 lb)   Height: 167.6 cm (66\")     Current Pain Level: none  Pulse Ox: Normal  on room air  General: alert, appears stated age and cooperative     Body Habitus: well-nourished    HEENT: Head: Normocephalic, no lesions, without obvious abnormality. No arcus senilis, xanthelasma or xanthomas.    Neuro: alert, oriented x3  Pulses: 2+ and symmetric  JVP: Volume/Pulsation: Normal.  Normal waveforms.   Appropriate inspiratory decrease.  No Kussmaul's. No Shane's.   Carotid Exam: no bruit normal pulsation bilaterally   Carotid Volume: normal.     Respirations: no increased work of breathing   Chest:  Normal    Pulmonary:Normal   Precordium: Normal impulses. P2 is not palpable.  RV Heave: absent  LV Heave: absent  [place]:  normal size and placement  Palpable S4: absent.  Heart rate: normal    Heart Rhythm: regular     Heart Sounds: S1: normal  S2: normal  S3: absent   S4: absent  Opening Snap: absent    Pericardial Rub:  Absent: .    Abdomen:   Appearance: normal .  Palpation: Soft, non-tender to palpation, bowel sounds positive in all four quadrants; no guarding or rebound tenderness  Extremity: no edema.   LE Skin: no rashes  LE Hair:  normal  LE Pulses: well perfused with normal pulses in the distal extremities  Pallor on elevation: Absent. Rubor on dependency: None      DATA REVIEWED:     EKG. I personally reviewed and interpreted the EKG.  Normal sinus rhythm with nonspecific T wave " changes    ECG/EMG Results (all)     None      CT:   No radiology results for the last 30 days.    --------------------------------------------------------------------------------------------------  LABS:     The 10-year CVD risk score (Kait et al., 2008) is: 37.1%    Values used to calculate the score:      Age: 69 years      Sex: Male      Diabetic: No      Tobacco smoker: No      Systolic Blood Pressure: 140 mmHg      Is BP treated: No      HDL Cholesterol: 29 mg/dL      Total Cholesterol: 222 mg/dL         Lab Results   Component Value Date    GLUCOSE 97 09/25/2020    BUN 11 09/25/2020    CREATININE 1.29 (H) 09/25/2020    EGFRIFNONA 55 (L) 09/25/2020    BCR 8.5 09/25/2020    K 5.0 09/25/2020    CO2 24.2 09/25/2020    CALCIUM 10.2 09/25/2020    ALBUMIN 4.10 09/25/2020    AST 19 09/25/2020    ALT 25 09/25/2020     Lab Results   Component Value Date    WBC 10.03 09/25/2020    HGB 12.7 (L) 09/25/2020    HCT 38.1 09/25/2020    MCV 80.7 09/25/2020     09/25/2020     Lab Results   Component Value Date    CHOL 182 09/25/2020    CHLPL 194 01/13/2016    TRIG 369 (H) 09/25/2020    HDL 36 (L) 09/25/2020    LDL 72 09/25/2020     Lab Results   Component Value Date    TSH 2.240 09/25/2020     No results found for: CKTOTAL, CKMB, CKMBINDEX, TROPONINI, TROPONINT  Lab Results   Component Value Date    HGBA1C 5.9 (H) 03/06/2018     No results found for: DDIMER  Lab Results   Component Value Date    ALT 25 09/25/2020     Lab Results   Component Value Date    HGBA1C 5.9 (H) 03/06/2018    HGBA1C 5.7 (H) 01/13/2016     Lab Results   Component Value Date    CREATININE 1.29 (H) 09/25/2020     No results found for: IRON, TIBC, FERRITIN  No results found for: INR, PROTIME    Assessment/Plan     1. Hyperlipidemia:  Continue pravastatin.  He checks his labs with his primary care physician. LDL was 72 09/2020, TG elevated at 369. Continue lifestyle modification/pravastatin and repeat TG in a few months    2. Chronic obstructive  pulmonary disease, unspecified COPD type (CMS/HCC)  Being managed with primary care physician, PFTS showed moderate obstruction, following pulmonary now.    3. Chest pain: Resolved  With his risk factors, age and symptoms, patient underwent a CTA which was with ca score of 1 and no significant obstructive disease.   Echo with normal LV systolic function and grade I DD  BP is normal in office today    4. SOB (shortness of breath):  Most likely related to COPD. Mild anemia. Echo as above with only grade I DD and normal EF.   CTA as above without evidence of obstructive disease  Will stop metoprolol in the setting of COPD, if bp rises consider adding HCTZ          Prevention:  Patient's Body mass index is 28.08 kg/m². BMI is within normal parameters. No follow-up required..      Yehuda Rutledge is a former smoker          This document has been electronically signed by Ava Colin MD on October 26, 2020 11:06 CDT

## 2020-11-23 ENCOUNTER — TELEPHONE (OUTPATIENT)
Dept: FAMILY MEDICINE CLINIC | Facility: CLINIC | Age: 70
End: 2020-11-23

## 2020-11-23 RX ORDER — AZITHROMYCIN 250 MG/1
TABLET, FILM COATED ORAL
Qty: 6 TABLET | Refills: 0 | Status: SHIPPED | OUTPATIENT
Start: 2020-11-23 | End: 2020-12-04

## 2020-11-23 NOTE — TELEPHONE ENCOUNTER
Pt called with c/o sinus congestion and eye pain due to congestion. Would like to know if could have something sent in for him. Please advise.

## 2020-12-04 ENCOUNTER — OFFICE VISIT (OUTPATIENT)
Dept: FAMILY MEDICINE CLINIC | Facility: CLINIC | Age: 70
End: 2020-12-04

## 2020-12-04 DIAGNOSIS — G89.29 CHRONIC BILATERAL LOW BACK PAIN WITH BILATERAL SCIATICA: ICD-10-CM

## 2020-12-04 DIAGNOSIS — M54.42 CHRONIC BILATERAL LOW BACK PAIN WITH BILATERAL SCIATICA: ICD-10-CM

## 2020-12-04 DIAGNOSIS — M54.41 CHRONIC BILATERAL LOW BACK PAIN WITH BILATERAL SCIATICA: ICD-10-CM

## 2020-12-04 DIAGNOSIS — J01.01 ACUTE RECURRENT MAXILLARY SINUSITIS: ICD-10-CM

## 2020-12-04 DIAGNOSIS — M54.12 CHRONIC CERVICAL RADICULOPATHY: ICD-10-CM

## 2020-12-04 PROCEDURE — G2025 DIS SITE TELE SVCS RHC/FQHC: HCPCS | Performed by: FAMILY MEDICINE

## 2020-12-04 RX ORDER — GABAPENTIN 100 MG/1
100 CAPSULE ORAL 2 TIMES DAILY
Qty: 60 CAPSULE | Refills: 3 | Status: SHIPPED | OUTPATIENT
Start: 2020-12-04 | End: 2021-03-08 | Stop reason: SDUPTHER

## 2020-12-04 RX ORDER — PREDNISONE 10 MG/1
10 TABLET ORAL SEE ADMIN INSTRUCTIONS
Qty: 17 TABLET | Refills: 0 | Status: SHIPPED | OUTPATIENT
Start: 2020-12-04 | End: 2021-01-05 | Stop reason: RX

## 2020-12-04 RX ORDER — GABAPENTIN 100 MG/1
100 CAPSULE ORAL 2 TIMES DAILY
Qty: 60 CAPSULE | Refills: 3 | Status: SHIPPED | OUTPATIENT
Start: 2020-12-04 | End: 2020-12-04 | Stop reason: SDUPTHER

## 2020-12-04 RX ORDER — LEVOFLOXACIN 750 MG/1
750 TABLET ORAL DAILY
Qty: 7 TABLET | Refills: 0 | Status: SHIPPED | OUTPATIENT
Start: 2020-12-04 | End: 2021-01-05

## 2020-12-06 NOTE — PROGRESS NOTES
Subjective    Yehuda Rutledge is a 70 y.o. overweight white male former smoker with COPD, Allergies, H/O Lumbar back surgery, Lumbago/Sciatica, H/O ETOH abuse in remission, Abd Hernia (S/P incisional hernia repair), and other health problems see PMH/PSH. Pt calls to discuss acute health problem, Tx and F/U plans.  You have chosen to receive care through a telephone visit. Do you consent to use a telephone visit for your medical care today? Yes      ' Have a sinus infection, headache, earache, drainage causing sore throat. Tylenol hasn't helped. Need Rx for Neurontin for chronic neck and back pain'.    Headache   This is a new problem. The current episode started in the past 7 days. The problem has been gradually worsening. The pain is at a severity of 5/10. The pain is moderate. Associated symptoms include back pain, coughing, eye pain, neck pain and sinus pressure. Pertinent negatives include no abdominal pain, fever or sore throat. He has tried acetaminophen for the symptoms. The treatment provided no relief.   Eye Pain   Both eyes are affected.Pertinent negatives include no eye discharge, fever or itching. The treatment provided no relief.   Earache   There is pain in both ears. This is a new problem. The current episode started in the past 7 days. There has been no fever. The pain is at a severity of 4/10. The pain is moderate. Associated symptoms include coughing, headaches and neck pain. Pertinent negatives include no abdominal pain, diarrhea or sore throat. He has tried acetaminophen for the symptoms. The treatment provided no relief.   Sinusitis  This is a recurrent problem. The current episode started in the past 7 days. The problem has been gradually worsening since onset. There has been no fever. His pain is at a severity of 6/10. The pain is moderate. Associated symptoms include congestion, coughing, headaches, neck pain and sinus pressure. Pertinent negatives include no chills, shortness of breath or  sore throat. Past treatments include acetaminophen. The treatment provided no relief.        The following portions of the patient's history were reviewed and updated as appropriate: allergies, current medications, past family history, past medical history, past social history, past surgical history and problem list.    Review of Systems   Constitutional: Negative for chills, fatigue and fever.   HENT: Positive for congestion and sinus pressure. Negative for facial swelling, mouth sores, postnasal drip and sore throat.    Eyes: Positive for pain. Negative for discharge, itching and visual disturbance.   Respiratory: Positive for cough. Negative for chest tightness, shortness of breath and wheezing.    Cardiovascular: Negative for chest pain, palpitations and leg swelling.   Gastrointestinal: Negative for abdominal pain, blood in stool, constipation and diarrhea.        Abd pain resolved after hernia repair.   Endocrine: Negative.  Negative for cold intolerance and heat intolerance.   Genitourinary: Negative.  Negative for difficulty urinating, dysuria, hematuria and urgency.   Musculoskeletal: Positive for arthralgias, back pain and neck pain. Negative for myalgias.        Neck and Back pain controlled with Neurontin   Skin: Negative.  Negative for color change, pallor and wound.   Allergic/Immunologic: Positive for environmental allergies. Negative for food allergies and immunocompromised state.   Neurological: Positive for headaches. Negative for tremors and speech difficulty.   Hematological: Negative for adenopathy. Does not bruise/bleed easily.   Psychiatric/Behavioral: Negative.  Negative for agitation, dysphoric mood and sleep disturbance. The patient is not nervous/anxious and is not hyperactive.        Objective   Physical Exam  Nursing note reviewed.   Constitutional:       General: He is not in acute distress.  Neurological:      Mental Status: He is oriented to person, place, and time.   Psychiatric:          Mood and Affect: Mood normal.         Behavior: Behavior normal.         Thought Content: Thought content normal.         Judgment: Judgment normal.         Assessment/Plan   Diagnoses and all orders for this visit:    1. Chronic cervical radiculopathy  -     Discontinue: gabapentin (NEURONTIN) 100 MG capsule; Take 1 capsule by mouth 2 (Two) Times a Day.  Dispense: 60 capsule; Refill: 3  -     gabapentin (NEURONTIN) 100 MG capsule; Take 1 capsule by mouth 2 (Two) Times a Day.  Dispense: 60 capsule; Refill: 3    2. Chronic bilateral low back pain with bilateral sciatica  -     Discontinue: gabapentin (NEURONTIN) 100 MG capsule; Take 1 capsule by mouth 2 (Two) Times a Day.  Dispense: 60 capsule; Refill: 3  -     gabapentin (NEURONTIN) 100 MG capsule; Take 1 capsule by mouth 2 (Two) Times a Day.  Dispense: 60 capsule; Refill: 3    3. Acute recurrent maxillary sinusitis  -     predniSONE (DELTASONE) 10 MG tablet; Take 1 tablet by mouth See Admin Instructions. Take 1 Tab Tid x3 days, Then 1 Tab Bid x 2 days Then 1 Tab QD x4 days,then D/C  Dispense: 17 tablet; Refill: 0  -     levoFLOXacin (Levaquin) 750 MG tablet; Take 1 tablet by mouth Daily.  Dispense: 7 tablet; Refill: 0      Discussed health problems, medications, indications, treatment plan, and rationale.  Discussed safety with controlled med.  Discussed follow-up plan.  This visit has been rescheduled as a phone visit to comply with patient safety concerns in accordance with CDC recommendations. Total time of discussion was 15 minutes.        This document has been electronically signed by Buzz Sifuentes MD

## 2020-12-15 ENCOUNTER — TELEPHONE (OUTPATIENT)
Dept: FAMILY MEDICINE CLINIC | Facility: CLINIC | Age: 70
End: 2020-12-15

## 2020-12-15 NOTE — TELEPHONE ENCOUNTER
----- Message from Buzz Sifuentes MD sent at 10/1/2020  1:35 PM CDT -----  Labs stable will go over at next visit.  
Letter mailed to pt  
NSAIDs

## 2021-01-05 ENCOUNTER — OFFICE VISIT (OUTPATIENT)
Dept: FAMILY MEDICINE CLINIC | Facility: CLINIC | Age: 71
End: 2021-01-05

## 2021-01-05 DIAGNOSIS — J01.01 ACUTE RECURRENT MAXILLARY SINUSITIS: ICD-10-CM

## 2021-01-05 DIAGNOSIS — H92.09 EARACHE: ICD-10-CM

## 2021-01-05 DIAGNOSIS — R51.9 HEADACHE AROUND THE EYES: ICD-10-CM

## 2021-01-05 PROCEDURE — G2025 DIS SITE TELE SVCS RHC/FQHC: HCPCS | Performed by: FAMILY MEDICINE

## 2021-01-05 RX ORDER — PREDNISONE 10 MG/1
10 TABLET ORAL SEE ADMIN INSTRUCTIONS
Qty: 17 TABLET | Refills: 0 | Status: SHIPPED | OUTPATIENT
Start: 2021-01-05 | End: 2021-03-08

## 2021-01-05 RX ORDER — AZITHROMYCIN 250 MG/1
TABLET, FILM COATED ORAL
Qty: 6 TABLET | Refills: 0 | Status: SHIPPED | OUTPATIENT
Start: 2021-01-05 | End: 2021-03-08

## 2021-01-05 NOTE — PROGRESS NOTES
Subjective    Yehuda Rutledge is a 70 y.o. overweight white male former smoker with COPD, Allergies, H/O Lumbar back surgery, Lumbago/Sciatica, H/O ETOH abuse in remission, Abd Hernia (S/P incisional hernia repair), and other health problems see PMH/PSH. Pt calls to discuss acute health problem, Tx and F/U plans.    You have chosen to receive care through a telephone visit. Do you consent to use a telephone visit for your medical care today? Yes     ' Sinus infection, Headache behind R eye continues, antibiotics helped some. Breathing has been OK.      Headache   This is a new problem. The current episode started 1 to 4 weeks ago. The problem has been gradually worsening. The pain is at a severity of 5/10. The pain is moderate. Associated symptoms include back pain, coughing, ear pain, eye pain, neck pain and sinus pressure. Pertinent negatives include no abdominal pain, fever or sore throat. He has tried acetaminophen for the symptoms. The treatment provided no relief.   Eye Pain   The right eye is affected. This is a new problem. The current episode started 1 to 4 weeks ago. The problem occurs daily. The problem has been gradually improving. Pertinent negatives include no eye discharge, fever or itching. The treatment provided mild relief.   Earache   There is pain in both ears. This is a new problem. The current episode started 1 to 4 weeks ago. There has been no fever. The pain is at a severity of 4/10. The pain is moderate. Associated symptoms include coughing, headaches and neck pain. Pertinent negatives include no abdominal pain, diarrhea or sore throat. He has tried acetaminophen and antibiotics for the symptoms. The treatment provided mild relief.   Sinusitis  This is a recurrent problem. The current episode started 1 to 4 weeks ago. The problem is unchanged. There has been no fever. His pain is at a severity of 6/10. The pain is moderate. Associated symptoms include congestion, coughing, ear pain,  headaches, neck pain and sinus pressure. Pertinent negatives include no chills, shortness of breath or sore throat. Past treatments include acetaminophen and antibiotics. The treatment provided mild relief.        The following portions of the patient's history were reviewed and updated as appropriate: allergies, current medications, past family history, past medical history, past social history, past surgical history and problem list.    Review of Systems   Constitutional: Negative for chills, fatigue and fever.   HENT: Positive for congestion, ear pain and sinus pressure. Negative for facial swelling, mouth sores, postnasal drip and sore throat.    Eyes: Positive for pain. Negative for discharge, itching and visual disturbance.   Respiratory: Positive for cough. Negative for chest tightness, shortness of breath and wheezing.    Cardiovascular: Negative for chest pain, palpitations and leg swelling.   Gastrointestinal: Negative for abdominal pain, blood in stool, constipation and diarrhea.        Abd pain resolved after hernia repair.   Endocrine: Negative.  Negative for cold intolerance and heat intolerance.   Genitourinary: Negative.  Negative for difficulty urinating, dysuria, hematuria and urgency.   Musculoskeletal: Positive for arthralgias, back pain and neck pain. Negative for myalgias.        Neck and Back pain controlled with Neurontin   Skin: Negative.  Negative for color change, pallor and wound.   Allergic/Immunologic: Positive for environmental allergies. Negative for food allergies and immunocompromised state.   Neurological: Positive for headaches. Negative for tremors and speech difficulty.   Hematological: Negative for adenopathy. Does not bruise/bleed easily.   Psychiatric/Behavioral: Negative.  Negative for agitation, dysphoric mood and sleep disturbance. The patient is not nervous/anxious and is not hyperactive.        Objective   Physical Exam  Nursing note reviewed.   Constitutional:        General: He is not in acute distress.  Pulmonary:      Effort: Pulmonary effort is normal.   Neurological:      Mental Status: He is oriented to person, place, and time.   Psychiatric:         Mood and Affect: Mood normal.         Behavior: Behavior normal.         Thought Content: Thought content normal.         Judgment: Judgment normal.         Assessment/Plan   Diagnoses and all orders for this visit:    1. Acute recurrent maxillary sinusitis  -     azithromycin (Zithromax) 250 MG tablet; Take 2 tablets the first day, then 1 tablet daily for 4 days.  Dispense: 6 tablet; Refill: 0  -     predniSONE (DELTASONE) 10 MG tablet; Take 1 tablet by mouth See Admin Instructions. Take 1 Tab Tid x3 days, Then 1 Tab Bid x 2 days Then 1 Tab QD x3 days,then D/C  Dispense: 17 tablet; Refill: 0    2. Headache around the eyes  -     azithromycin (Zithromax) 250 MG tablet; Take 2 tablets the first day, then 1 tablet daily for 4 days.  Dispense: 6 tablet; Refill: 0  -     predniSONE (DELTASONE) 10 MG tablet; Take 1 tablet by mouth See Admin Instructions. Take 1 Tab Tid x3 days, Then 1 Tab Bid x 2 days Then 1 Tab QD x3 days,then D/C  Dispense: 17 tablet; Refill: 0    3. Earache  -     azithromycin (Zithromax) 250 MG tablet; Take 2 tablets the first day, then 1 tablet daily for 4 days.  Dispense: 6 tablet; Refill: 0  -     predniSONE (DELTASONE) 10 MG tablet; Take 1 tablet by mouth See Admin Instructions. Take 1 Tab Tid x3 days, Then 1 Tab Bid x 2 days Then 1 Tab QD x3 days,then D/C  Dispense: 17 tablet; Refill: 0      Discussed health problems, meds indications treatment plan and rationale. Discussed F/U if not improved.   This visit has been rescheduled as a phone visit to comply with patient safety concerns in accordance with CDC recommendations. Total time of discussion was 15 minutes.            This document has been electronically signed by Buzz Sifuentes MD

## 2021-01-07 ENCOUNTER — TELEPHONE (OUTPATIENT)
Dept: FAMILY MEDICINE CLINIC | Facility: CLINIC | Age: 71
End: 2021-01-07

## 2021-01-07 NOTE — TELEPHONE ENCOUNTER
Patient called and said he was feeling a little better but he was still having the head aches right behind his ear.

## 2021-01-10 PROBLEM — R51.9 HEADACHE AROUND THE EYES: Status: ACTIVE | Noted: 2021-01-10

## 2021-01-10 PROBLEM — H92.09 EARACHE: Status: ACTIVE | Noted: 2021-01-10

## 2021-03-08 ENCOUNTER — OFFICE VISIT (OUTPATIENT)
Dept: FAMILY MEDICINE CLINIC | Facility: CLINIC | Age: 71
End: 2021-03-08

## 2021-03-08 VITALS
DIASTOLIC BLOOD PRESSURE: 70 MMHG | HEIGHT: 66 IN | OXYGEN SATURATION: 98 % | SYSTOLIC BLOOD PRESSURE: 126 MMHG | BODY MASS INDEX: 28.28 KG/M2 | HEART RATE: 93 BPM | WEIGHT: 176 LBS | TEMPERATURE: 98 F

## 2021-03-08 DIAGNOSIS — F51.01 PRIMARY INSOMNIA: ICD-10-CM

## 2021-03-08 DIAGNOSIS — E66.3 OVERWEIGHT (BMI 25.0-29.9): ICD-10-CM

## 2021-03-08 DIAGNOSIS — M54.41 CHRONIC BILATERAL LOW BACK PAIN WITH BILATERAL SCIATICA: ICD-10-CM

## 2021-03-08 DIAGNOSIS — Z91.09 ENVIRONMENTAL ALLERGIES: ICD-10-CM

## 2021-03-08 DIAGNOSIS — E78.5 HYPERLIPIDEMIA, UNSPECIFIED HYPERLIPIDEMIA TYPE: Primary | ICD-10-CM

## 2021-03-08 DIAGNOSIS — M54.42 CHRONIC BILATERAL LOW BACK PAIN WITH BILATERAL SCIATICA: ICD-10-CM

## 2021-03-08 DIAGNOSIS — J30.9 CHRONIC ALLERGIC RHINITIS: ICD-10-CM

## 2021-03-08 DIAGNOSIS — E78.5 HYPERLIPIDEMIA, UNSPECIFIED HYPERLIPIDEMIA TYPE: ICD-10-CM

## 2021-03-08 DIAGNOSIS — Z79.899 HIGH RISK MEDICATION USE: ICD-10-CM

## 2021-03-08 DIAGNOSIS — G89.29 CHRONIC BILATERAL LOW BACK PAIN WITH BILATERAL SCIATICA: ICD-10-CM

## 2021-03-08 DIAGNOSIS — M54.12 CHRONIC CERVICAL RADICULOPATHY: ICD-10-CM

## 2021-03-08 DIAGNOSIS — M54.41 CHRONIC RIGHT-SIDED LOW BACK PAIN WITH RIGHT-SIDED SCIATICA: ICD-10-CM

## 2021-03-08 DIAGNOSIS — G89.29 CHRONIC RIGHT-SIDED LOW BACK PAIN WITH RIGHT-SIDED SCIATICA: ICD-10-CM

## 2021-03-08 PROCEDURE — 99214 OFFICE O/P EST MOD 30 MIN: CPT | Performed by: FAMILY MEDICINE

## 2021-03-08 RX ORDER — PRAVASTATIN SODIUM 20 MG
20 TABLET ORAL NIGHTLY
Qty: 90 TABLET | Refills: 1 | Status: SHIPPED | OUTPATIENT
Start: 2021-03-08 | End: 2021-07-16 | Stop reason: SDUPTHER

## 2021-03-08 RX ORDER — GABAPENTIN 100 MG/1
100 CAPSULE ORAL 2 TIMES DAILY
Qty: 60 CAPSULE | Refills: 3 | Status: SHIPPED | OUTPATIENT
Start: 2021-03-08 | End: 2021-07-16 | Stop reason: SDUPTHER

## 2021-03-08 RX ORDER — FLUTICASONE PROPIONATE 50 MCG
2 SPRAY, SUSPENSION (ML) NASAL DAILY
Qty: 18.2 ML | Refills: 5 | Status: SHIPPED | OUTPATIENT
Start: 2021-03-08 | End: 2021-07-16 | Stop reason: SDUPTHER

## 2021-03-08 RX ORDER — PRAVASTATIN SODIUM 20 MG
20 TABLET ORAL NIGHTLY
Qty: 90 TABLET | Refills: 1 | Status: SHIPPED | OUTPATIENT
Start: 2021-03-08 | End: 2021-07-08 | Stop reason: SDUPTHER

## 2021-03-08 NOTE — PROGRESS NOTES
Chief Complaint  Hyperlipidemia, Allergies, Neck Pain, Arthritis, COPD, Insomnia, and Fatigue    Subjective          Yehuda Rutledge presents to [facility]       Hyperlipidemia  This is a chronic problem. The current episode started more than 1 year ago. The problem is controlled.   Allergies  This is a chronic problem. The current episode started more than 1 year ago. The problem occurs daily. The problem has been gradually improving. Associated symptoms include fatigue.   Neck Pain   This is a chronic problem. The current episode started more than 1 year ago. The problem occurs intermittently. The problem has been waxing and waning. The pain is at a severity of 3/10. The pain is mild. He has tried NSAIDs and muscle relaxants for the symptoms. The treatment provided mild relief.   Arthritis  Presents for follow-up visit. Affected location: multiple joints. Associated symptoms include fatigue.   COPD  He complains of wheezing. The current episode started more than 1 year ago. The problem occurs intermittently. His past medical history is significant for COPD.   Insomnia  This is a chronic problem. The current episode started more than 1 year ago. The problem occurs intermittently. Associated symptoms include fatigue. He has tried rest and sleep (Neurontin) for the symptoms. The treatment provided moderate relief.   Fatigue  This is a chronic (improved.) problem. The current episode started more than 1 year ago. The problem occurs daily. The problem has been waxing and waning. Associated symptoms include fatigue.   Headache   The current episode started more than 1 month ago. The problem has been resolved. The pain is at a severity of 0/10. The patient is experiencing no pain. Associated symptoms include back pain and insomnia. He has tried acetaminophen and NSAIDs for the symptoms. The treatment provided significant relief.       Objective   Vital Signs:   /70 (BP Location: Left  "arm, Patient Position: Sitting, Cuff Size: Adult)   Pulse 93   Temp 98 °F (36.7 °C) (Temporal)   Ht 167.6 cm (66\")   Wt 79.8 kg (176 lb)   SpO2 98%   BMI 28.41 kg/m²     Physical Exam  Vitals and nursing note reviewed.   HENT:      Head: Atraumatic.      Right Ear: Tympanic membrane, ear canal and external ear normal. There is no impacted cerumen.      Left Ear: Tympanic membrane, ear canal and external ear normal. There is no impacted cerumen.      Nose: No congestion or rhinorrhea.      Mouth/Throat:      Mouth: Mucous membranes are moist.      Pharynx: No oropharyngeal exudate or posterior oropharyngeal erythema.   Eyes:      Conjunctiva/sclera: Conjunctivae normal.      Pupils: Pupils are equal, round, and reactive to light.   Cardiovascular:      Rate and Rhythm: Normal rate and regular rhythm.      Heart sounds: No murmur. No gallop.    Pulmonary:      Effort: Pulmonary effort is normal.      Breath sounds: Normal breath sounds.   Abdominal:      General: Bowel sounds are normal.      Palpations: Abdomen is soft.      Tenderness: There is no abdominal tenderness.   Musculoskeletal:         General: Normal range of motion.      Cervical back: Muscular tenderness present.   Skin:     General: Skin is warm and dry.      Capillary Refill: Capillary refill takes 2 to 3 seconds.      Findings: No erythema.   Neurological:      Cranial Nerves: No cranial nerve deficit.   Psychiatric:         Mood and Affect: Mood normal.         Behavior: Behavior normal.         Thought Content: Thought content normal.         Judgment: Judgment normal.        Result Review :                 Assessment and Plan    Diagnoses and all orders for this visit:    1. Chronic cervical radiculopathy  -     gabapentin (NEURONTIN) 100 MG capsule; Take 1 capsule by mouth 2 (Two) Times a Day.  Dispense: 60 capsule; Refill: 3    2. Chronic bilateral low back pain with bilateral sciatica  -     gabapentin (NEURONTIN) 100 MG capsule; Take 1 " capsule by mouth 2 (Two) Times a Day.  Dispense: 60 capsule; Refill: 3    3. Overweight (BMI 25.0-29.9)    4. Chronic right-sided low back pain with right-sided sciatica    5. Hyperlipidemia, unspecified hyperlipidemia type  -     pravastatin (PRAVACHOL) 20 MG tablet; Take 1 tablet by mouth Every Night.  Dispense: 90 tablet; Refill: 1    6. Primary insomnia    7. Environmental allergies    8. High risk medication use    9. Chronic allergic rhinitis  -     fluticasone (FLONASE) 50 MCG/ACT nasal spray; 2 sprays into the nostril(s) as directed by provider Daily. Administer 2 sprays in each nostril for each dose.  Dispense: 18.2 mL; Refill: 5        Follow Up   Return in about 4 months (around 7/8/2021).  Patient was given instructions and counseling regarding his condition or for health maintenance advice. Please see specific information pulled into the AVS if appropriate.         This document has been electronically signed by Buzz Sifuentes MD

## 2021-06-01 ENCOUNTER — OFFICE VISIT (OUTPATIENT)
Dept: FAMILY MEDICINE CLINIC | Facility: CLINIC | Age: 71
End: 2021-06-01

## 2021-06-01 VITALS
SYSTOLIC BLOOD PRESSURE: 122 MMHG | BODY MASS INDEX: 28.45 KG/M2 | RESPIRATION RATE: 20 BRPM | HEIGHT: 66 IN | OXYGEN SATURATION: 98 % | DIASTOLIC BLOOD PRESSURE: 74 MMHG | WEIGHT: 177 LBS | HEART RATE: 82 BPM | TEMPERATURE: 97.8 F

## 2021-06-01 DIAGNOSIS — J01.00 ACUTE MAXILLARY SINUSITIS, RECURRENCE NOT SPECIFIED: Primary | ICD-10-CM

## 2021-06-01 DIAGNOSIS — H93.13 TINNITUS OF BOTH EARS: ICD-10-CM

## 2021-06-01 PROCEDURE — 99214 OFFICE O/P EST MOD 30 MIN: CPT | Performed by: NURSE PRACTITIONER

## 2021-06-01 RX ORDER — DOXYCYCLINE HYCLATE 100 MG/1
100 CAPSULE ORAL 2 TIMES DAILY
Qty: 20 CAPSULE | Refills: 0 | Status: SHIPPED | OUTPATIENT
Start: 2021-06-01 | End: 2021-07-08

## 2021-06-01 NOTE — PATIENT INSTRUCTIONS
Sinusitis, Adult  Sinusitis is inflammation of your sinuses. Sinuses are hollow spaces in the bones around your face. Your sinuses are located:  · Around your eyes.  · In the middle of your forehead.  · Behind your nose.  · In your cheekbones.  Mucus normally drains out of your sinuses. When your nasal tissues become inflamed or swollen, mucus can become trapped or blocked. This allows bacteria, viruses, and fungi to grow, which leads to infection. Most infections of the sinuses are caused by a virus.  Sinusitis can develop quickly. It can last for up to 4 weeks (acute) or for more than 12 weeks (chronic). Sinusitis often develops after a cold.  What are the causes?  This condition is caused by anything that creates swelling in the sinuses or stops mucus from draining. This includes:  · Allergies.  · Asthma.  · Infection from bacteria or viruses.  · Deformities or blockages in your nose or sinuses.  · Abnormal growths in the nose (nasal polyps).  · Pollutants, such as chemicals or irritants in the air.  · Infection from fungi (rare).  What increases the risk?  You are more likely to develop this condition if you:  · Have a weak body defense system (immune system).  · Do a lot of swimming or diving.  · Overuse nasal sprays.  · Smoke.  What are the signs or symptoms?  The main symptoms of this condition are pain and a feeling of pressure around the affected sinuses. Other symptoms include:  · Stuffy nose or congestion.  · Thick drainage from your nose.  · Swelling and warmth over the affected sinuses.  · Headache.  · Upper toothache.  · A cough that may get worse at night.  · Extra mucus that collects in the throat or the back of the nose (postnasal drip).  · Decreased sense of smell and taste.  · Fatigue.  · A fever.  · Sore throat.  · Bad breath.  How is this diagnosed?  This condition is diagnosed based on:  · Your symptoms.  · Your medical history.  · A physical exam.  · Tests to find out if your condition is  acute or chronic. This may include:  ? Checking your nose for nasal polyps.  ? Viewing your sinuses using a device that has a light (endoscope).  ? Testing for allergies or bacteria.  ? Imaging tests, such as an MRI or CT scan.  In rare cases, a bone biopsy may be done to rule out more serious types of fungal sinus disease.  How is this treated?  Treatment for sinusitis depends on the cause and whether your condition is chronic or acute.  · If caused by a virus, your symptoms should go away on their own within 10 days. You may be given medicines to relieve symptoms. They include:  ? Medicines that shrink swollen nasal passages (topical intranasal decongestants).  ? Medicines that treat allergies (antihistamines).  ? A spray that eases inflammation of the nostrils (topical intranasal corticosteroids).  ? Rinses that help get rid of thick mucus in your nose (nasal saline washes).  · If caused by bacteria, your health care provider may recommend waiting to see if your symptoms improve. Most bacterial infections will get better without antibiotic medicine. You may be given antibiotics if you have:  ? A severe infection.  ? A weak immune system.  · If caused by narrow nasal passages or nasal polyps, you may need to have surgery.  Follow these instructions at home:  Medicines  · Take, use, or apply over-the-counter and prescription medicines only as told by your health care provider. These may include nasal sprays.  · If you were prescribed an antibiotic medicine, take it as told by your health care provider. Do not stop taking the antibiotic even if you start to feel better.  Hydrate and humidify    · Drink enough fluid to keep your urine pale yellow. Staying hydrated will help to thin your mucus.  · Use a cool mist humidifier to keep the humidity level in your home above 50%.  · Inhale steam for 10-15 minutes, 3-4 times a day, or as told by your health care provider. You can do this in the bathroom while a hot shower is  running.  · Limit your exposure to cool or dry air.  Rest  · Rest as much as possible.  · Sleep with your head raised (elevated).  · Make sure you get enough sleep each night.  General instructions    · Apply a warm, moist washcloth to your face 3-4 times a day or as told by your health care provider. This will help with discomfort.  · Wash your hands often with soap and water to reduce your exposure to germs. If soap and water are not available, use hand .  · Do not smoke. Avoid being around people who are smoking (secondhand smoke).  · Keep all follow-up visits as told by your health care provider. This is important.  Contact a health care provider if:  · You have a fever.  · Your symptoms get worse.  · Your symptoms do not improve within 10 days.  Get help right away if:  · You have a severe headache.  · You have persistent vomiting.  · You have severe pain or swelling around your face or eyes.  · You have vision problems.  · You develop confusion.  · Your neck is stiff.  · You have trouble breathing.  Summary  · Sinusitis is soreness and inflammation of your sinuses. Sinuses are hollow spaces in the bones around your face.  · This condition is caused by nasal tissues that become inflamed or swollen. The swelling traps or blocks the flow of mucus. This allows bacteria, viruses, and fungi to grow, which leads to infection.  · If you were prescribed an antibiotic medicine, take it as told by your health care provider. Do not stop taking the antibiotic even if you start to feel better.  · Keep all follow-up visits as told by your health care provider. This is important.  This information is not intended to replace advice given to you by your health care provider. Make sure you discuss any questions you have with your health care provider.  Document Revised: 05/20/2019 Document Reviewed: 05/20/2019  [employer] Patient Education © 2021 [employer]  Tinnitus  Tinnitus refers to hearing a sound when there is no  actual source for that sound. This is often described as ringing in the ears. However, people with this condition may hear a variety of noises, in one ear or in both ears.  The sounds of tinnitus can be soft, loud, or somewhere in between. Tinnitus can last for a few seconds or can be constant for days. It may go away without treatment and come back at various times. When tinnitus is constant or happens often, it can lead to other problems, such as trouble sleeping and trouble concentrating.  Almost everyone experiences tinnitus at some point. Tinnitus that is long-lasting (chronic) or comes back often (recurs) may require medical attention.  What are the causes?  The cause of tinnitus is often not known. In some cases, it can result from:  · Exposure to loud noises from machinery, music, or other sources.  · An object (foreign body) stuck in the ear.  · Earwax buildup.  · Drinking alcohol or caffeine.  · Taking certain medicines.  · Age-related hearing loss.  It may also be caused by medical conditions such as:  · Ear or sinus infections.  · High blood pressure.  · Heart diseases.  · Anemia.  · Allergies.  · Meniere's disease.  · Thyroid problems.  · Tumors.  · A weak, bulging blood vessel (aneurysm) near the ear.  What are the signs or symptoms?  The main symptom of tinnitus is hearing a sound when there is no source for that sound. It may sound like:  · Buzzing.  · Roaring.  · Ringing.  · Blowing air.  · Hissing.  · Whistling.  · Sizzling.  · Humming.  · Running water.  · A musical note.  · Tapping.  Symptoms may affect only one ear (unilateral) or both ears (bilateral).  How is this diagnosed?  Tinnitus is diagnosed based on your symptoms, your medical history, and a physical exam. Your health care provider may do a thorough hearing test (audiologic exam) if your tinnitus:  · Is unilateral.  · Causes hearing difficulties.  · Lasts 6 months or longer.  You may work with a health care provider who specializes in  hearing disorders (audiologist). You may be asked questions about your symptoms and how they affect your daily life. You may have other tests done, such as:  · CT scan.  · MRI.  · An imaging test of how blood flows through your blood vessels (angiogram).  How is this treated?  Treating an underlying medical condition can sometimes make tinnitus go away. If your tinnitus continues, other treatments may include:  · Medicines.  · Therapy and counseling to help you manage the stress of living with tinnitus.  · Sound generators to mask the tinnitus. These include:  ? Tabletop sound machines that play relaxing sounds to help you fall asleep.  ? Wearable devices that fit in your ear and play sounds or music.  ? Acoustic neural stimulation. This involves using headphones to listen to music that contains an auditory signal. Over time, listening to this signal may change some pathways in your brain and make you less sensitive to tinnitus. This treatment is used for very severe cases when no other treatment is working.  · Using hearing aids or cochlear implants if your tinnitus is related to hearing loss. Hearing aids are worn in the outer ear. Cochlear implants are surgically placed in the inner ear.  Follow these instructions at home:  Managing symptoms         · When possible, avoid being in loud places and being exposed to loud sounds.  · Wear hearing protection, such as earplugs, when you are exposed to loud noises.  · Use a white noise machine, a humidifier, or other devices to mask the sound of tinnitus.  · Practice techniques for reducing stress, such as meditation, yoga, or deep breathing. Work with your health care provider if you need help with managing stress.  · Sleep with your head slightly raised. This may reduce the impact of tinnitus.  General instructions  · Do not use stimulants, such as nicotine, alcohol, or caffeine. Talk with your health care provider about other stimulants to avoid. Stimulants are  substances that can make you feel alert and attentive by increasing certain activities in the body (such as heart rate and blood pressure). These substances may make tinnitus worse.  · Take over-the-counter and prescription medicines only as told by your health care provider.  · Try to get plenty of sleep each night.  · Keep all follow-up visits as told by your health care provider. This is important.  Contact a health care provider if:  · Your tinnitus continues for 3 weeks or longer without stopping.  · You develop sudden hearing loss.  · Your symptoms get worse or do not get better with home care.  · You feel you are not able to manage the stress of living with tinnitus.  Get help right away if:  · You develop tinnitus after a head injury.  · You have tinnitus along with any of the following:  ? Dizziness.  ? Loss of balance.  ? Nausea and vomiting.  ? Sudden, severe headache.  These symptoms may represent a serious problem that is an emergency. Do not wait to see if the symptoms will go away. Get medical help right away. Call your local emergency services (911 in the [place] Do not drive yourself to the hospital.  Summary  · Tinnitus refers to hearing a sound when there is no actual source for that sound. This is often described as ringing in the ears.  · Symptoms may affect only one ear (unilateral) or both ears (bilateral).  · Use a white noise machine, a humidifier, or other devices to mask the sound of tinnitus.  · Do not use stimulants, such as nicotine, alcohol, or caffeine. Talk with your health care provider about other stimulants to avoid. These substances may make tinnitus worse.  This information is not intended to replace advice given to you by your health care provider. Make sure you discuss any questions you have with your health care provider.  Document Revised: 07/01/2020 Document Reviewed: 09/27/2018  [employer] Patient Education © 2021 [employer]

## 2021-06-01 NOTE — PROGRESS NOTES
Chief Complaint  Tinnitus and Sinusitis    Subjective    History of Present Illness {CC  Problem List  Visit  Diagnosis   Encounters  Notes  Medications  Labs  Result Review Imaging  Media :23}     Yehuda Rutledge presents to [facility] for   C/o ringing in both ears and sinus drainage 5 days. Reports getting these sx same time yearly. Denies SOA, fever, loss of taste/smell. Has had both COVID vaccinations.     Tinnitus  This is a new problem. The current episode started in the past 7 days. The problem has been unchanged. Associated symptoms include congestion. Pertinent negatives include no chest pain, coughing, fatigue, fever, headaches, nausea, sore throat, swollen glands or vertigo. He has tried nothing for the symptoms.   Sinusitis  This is a recurrent problem. The current episode started in the past 7 days. The problem has been gradually worsening since onset. There has been no fever. He is experiencing no pain. Associated symptoms include congestion and sinus pressure. Pertinent negatives include no coughing, headaches, sore throat or swollen glands. Past treatments include nothing.        Objective     Physical Exam  Vitals and nursing note reviewed.   Constitutional:       General: He is not in acute distress.     Appearance: Normal appearance. He is normal weight. He is not ill-appearing.   HENT:      Head: Normocephalic and atraumatic.      Right Ear: Tympanic membrane, ear canal and external ear normal.      Left Ear: Tympanic membrane, ear canal and external ear normal.      Nose: Congestion present.      Right Sinus: Maxillary sinus tenderness present.      Left Sinus: Maxillary sinus tenderness present.   Eyes:      Conjunctiva/sclera: Conjunctivae normal.      Pupils: Pupils are equal, round, and reactive to light.   Cardiovascular:      Rate and Rhythm: Normal rate and regular rhythm.      Heart sounds: Normal heart sounds.   Pulmonary:      Effort:  "Pulmonary effort is normal.      Breath sounds: Normal breath sounds. No wheezing or rhonchi.   Musculoskeletal:         General: Normal range of motion.      Cervical back: Normal range of motion and neck supple.   Lymphadenopathy:      Cervical: No cervical adenopathy.   Skin:     General: Skin is warm and dry.   Neurological:      General: No focal deficit present.      Mental Status: He is alert and oriented to person, place, and time.   Psychiatric:         Mood and Affect: Mood normal.         Behavior: Behavior normal.        Result Review  Data Reviewed:{ Labs  Result Review  Imaging  Med Tab  Media :23}          Vital Signs:   /74 (BP Location: Left arm, Patient Position: Sitting, Cuff Size: Adult)   Pulse 82   Temp 97.8 °F (36.6 °C) (Temporal)   Resp 20   Ht 167.6 cm (66\")   Wt 80.3 kg (177 lb)   SpO2 98%   BMI 28.57 kg/m²          Assessment and Plan {CC Problem List  Visit Diagnosis  ROS  Review (Popup)  Health Maintenance  Quality  BestPractice  Medications  SmartSets  SnapShot Encounters  Media :23}   Problem List Items Addressed This Visit     None      Visit Diagnoses     Acute maxillary sinusitis, recurrence not specified    -  Primary    Relevant Medications    doxycycline (VIBRAMYCIN) 100 MG capsule    Tinnitus of both ears             Diagnosis Plan   1. Acute maxillary sinusitis, recurrence not specified  doxycycline (VIBRAMYCIN) 100 MG capsule   2. Tinnitus of both ears       - RX for Doxy prescribed for worsening sinusitis sx  - Cont mucinex, albuterol PRN, flonase  - Increase oral water intake and rest.  - RTC PRN or f/u with PCP, Dr. Sifuentes, as scheduled or if sx persist.    Follow Up {Instructions Charge Capture  Follow-up Communications :23}   Return if symptoms worsen or fail to improve.  Patient was given instructions and counseling regarding his condition or for health maintenance advice. Please see specific information pulled into the AVS if " appropriate            .  This document has been electronically signed by YOU Puentes on June 16, 2021 22:59 CDT

## 2021-06-21 NOTE — PROGRESS NOTES
PFT read only Dr Nick   Melolabial Interpolation Flap Text: A decision was made to reconstruct the defect utilizing an interpolation axial flap and a staged reconstruction.  A telfa template was made of the defect.  This telfa template was then used to outline the melolabial interpolation flap.  The donor area for the pedicle flap was then injected with anesthesia.  The flap was excised through the skin and subcutaneous tissue down to the layer of the underlying musculature.  The pedicle flap was carefully excised within this deep plane to maintain its blood supply.  The edges of the donor site were undermined.   The donor site was closed in a primary fashion.  The pedicle was then rotated into position and sutured.  Once the tube was sutured into place, adequate blood supply was confirmed with blanching and refill.  The pedicle was then wrapped with xeroform gauze and dressed appropriately with a telfa and gauze bandage to ensure continued blood supply and protect the attached pedicle.

## 2021-06-24 ENCOUNTER — TELEPHONE (OUTPATIENT)
Dept: FAMILY MEDICINE CLINIC | Facility: CLINIC | Age: 71
End: 2021-06-24

## 2021-06-24 RX ORDER — PREDNISONE 10 MG/1
10 TABLET ORAL SEE ADMIN INSTRUCTIONS
Qty: 17 TABLET | Refills: 0 | Status: SHIPPED | OUTPATIENT
Start: 2021-06-24 | End: 2021-07-08

## 2021-06-24 RX ORDER — CIPROFLOXACIN 500 MG/1
500 TABLET, FILM COATED ORAL 2 TIMES DAILY
Qty: 10 TABLET | Refills: 0 | Status: SHIPPED | OUTPATIENT
Start: 2021-06-24 | End: 2021-07-08

## 2021-06-24 NOTE — TELEPHONE ENCOUNTER
Spoke with pt to let him know Dr. Sifuentes sent medications in for him. Pt voiced understanding.

## 2021-06-24 NOTE — TELEPHONE ENCOUNTER
"Pt called with c/o ears still plugged and sinus issues. Was seen by Gloria Sams 6/1/21, took prescribed medications. Symptoms went away but came right back. Would like to know if could have something to \"open ears and sinuses\".  Please advise.   "

## 2021-07-07 ENCOUNTER — TELEPHONE (OUTPATIENT)
Dept: FAMILY MEDICINE CLINIC | Facility: CLINIC | Age: 71
End: 2021-07-07

## 2021-07-08 ENCOUNTER — OFFICE VISIT (OUTPATIENT)
Dept: FAMILY MEDICINE CLINIC | Facility: CLINIC | Age: 71
End: 2021-07-08

## 2021-07-08 VITALS
WEIGHT: 176.9 LBS | HEIGHT: 66 IN | DIASTOLIC BLOOD PRESSURE: 70 MMHG | TEMPERATURE: 98 F | BODY MASS INDEX: 28.43 KG/M2 | OXYGEN SATURATION: 94 % | SYSTOLIC BLOOD PRESSURE: 122 MMHG | HEART RATE: 84 BPM

## 2021-07-08 DIAGNOSIS — Z00.00 MEDICARE ANNUAL WELLNESS VISIT, SUBSEQUENT: Primary | ICD-10-CM

## 2021-07-08 PROBLEM — R05.9 COUGH: Status: RESOLVED | Noted: 2020-09-12 | Resolved: 2021-07-08

## 2021-07-08 PROBLEM — H92.09 EARACHE: Status: RESOLVED | Noted: 2021-01-10 | Resolved: 2021-07-08

## 2021-07-08 PROBLEM — R51.9 HEADACHE AROUND THE EYES: Status: RESOLVED | Noted: 2021-01-10 | Resolved: 2021-07-08

## 2021-07-08 PROBLEM — K42.9 UMBILICAL HERNIA WITHOUT OBSTRUCTION AND WITHOUT GANGRENE: Status: RESOLVED | Noted: 2017-11-06 | Resolved: 2021-07-08

## 2021-07-08 PROCEDURE — G0439 PPPS, SUBSEQ VISIT: HCPCS | Performed by: FAMILY MEDICINE

## 2021-07-08 NOTE — PROGRESS NOTES
The ABCs of the Annual Wellness Visit  Subsequent Medicare Wellness Visit    Chief Complaint   Patient presents with   • Annual Exam     Subsequent Medicare Wellness       Subjective   History of Present Illness:  Yehuda Rutledge is a 70 y.o. male who presents for a Subsequent Medicare Wellness Visit.    HEALTH RISK ASSESSMENT    Recent Hospitalizations:  No hospitalization(s) within the last year.    Current Medical Providers:  Patient Care Team:  Buzz Sifuentes MD as PCP - General  Sincere Santamaria OD (Optometry)    Smoking Status:  Social History     Tobacco Use   Smoking Status Former Smoker   • Packs/day: 1.00   • Years: 40.00   • Pack years: 40.00   • Types: Cigarettes   • Quit date: 11/8/2017   • Years since quitting: 3.6   Smokeless Tobacco Never Used       Alcohol Consumption:  Social History     Substance and Sexual Activity   Alcohol Use No       Depression Screen:   PHQ-2/PHQ-9 Depression Screening 7/8/2021   Little interest or pleasure in doing things 0   Feeling down, depressed, or hopeless 0   Total Score 0       Fall Risk Screen:  STEADI Fall Risk Assessment was completed, and patient is at MODERATE risk for falls. Assessment completed on:7/8/2021    Health Habits and Functional and Cognitive Screening:  Functional & Cognitive Status 7/8/2021   Do you have difficulty preparing food and eating? No   Do you have difficulty bathing yourself, getting dressed or grooming yourself? No   Do you have difficulty using the toilet? No   Do you have difficulty moving around from place to place? No   Do you have trouble with steps or getting out of a bed or a chair? No   Current Diet Well Balanced Diet   Dental Exam Other   Eye Exam Up to date   Exercise (times per week) 6 times per week   Current Exercises Include Walking   Current Exercise Activities Include -   Do you need help using the phone?  No   Are you deaf or do you have serious difficulty hearing?  No   Do you need help with transportation?  No   Do you need help shopping? No   Do you need help preparing meals?  No   Do you need help with housework?  No   Do you need help with laundry? No   Do you need help taking your medications? No   Do you need help managing money? No   Do you ever drive or ride in a car without wearing a seat belt? Yes   Have you felt unusual stress, anger or loneliness in the last month? No   Who do you live with? Alone   If you need help, do you have trouble finding someone available to you? No   Have you been bothered in the last four weeks by sexual problems? No   Do you have difficulty concentrating, remembering or making decisions? No         Does the patient have evidence of cognitive impairment? No    Asprin use counseling:Taking ASA appropriately as indicated    Age-appropriate Screening Schedule:  Refer to the list below for future screening recommendations based on patient's age, sex and/or medical conditions. Orders for these recommended tests are listed in the plan section. The patient has been provided with a written plan.    Health Maintenance   Topic Date Due   • ZOSTER VACCINE (2 of 2) 05/16/2018   • INFLUENZA VACCINE  08/01/2021   • LIPID PANEL  09/25/2021   • TDAP/TD VACCINES (2 - Tdap) 07/31/2025          The following portions of the patient's history were reviewed and updated as appropriate: allergies, current medications, past family history, past medical history, past social history, past surgical history and problem list.    Outpatient Medications Prior to Visit   Medication Sig Dispense Refill   • albuterol (PROVENTIL HFA;VENTOLIN HFA) 108 (90 BASE) MCG/ACT inhaler Inhale 2 puffs Every 4 (Four) Hours As Needed for Shortness of Air or Wheezing. 2 puffs by  Mouth every 4-6 hours       • aspirin 81 MG EC tablet Take 81 mg by mouth daily.     • Aspirin-Salicylamide-Caffeine (BC HEADACHE POWDER PO) Take  by mouth.     • cetirizine (zyrTEC) 10 MG tablet Take 1 tablet by mouth Daily. 30 tablet 5   • fluticasone  (FLONASE) 50 MCG/ACT nasal spray 2 sprays into the nostril(s) as directed by provider Daily. Administer 2 sprays in each nostril for each dose. 18.2 mL 5   • gabapentin (NEURONTIN) 100 MG capsule Take 1 capsule by mouth 2 (Two) Times a Day. 60 capsule 3   • guaiFENesin (MUCINEX PO) Take  by mouth.     • Misc Natural Products (OSTEO BI-FLEX JOINT SHIELD PO) Take 1 tablet by mouth Daily.     • Misc Natural Products (PROSTATE HEALTH) capsule Take 1 capsule by mouth 2 (Two) Times a Day.     • Multiple Vitamins-Minerals (MULTIVITAMIN ADULT PO) Take 1 tablet by mouth Daily.     • Omega-3 Fatty Acids (FISH OIL) 1000 MG capsule capsule Take 1,000 mg by mouth Daily With Breakfast.     • pravastatin (PRAVACHOL) 20 MG tablet Take 1 tablet by mouth Every Night. 90 tablet 1   • sodium chloride 0.65 % nasal spray 1 spray into the nostril(s) as directed by provider As Needed.     • ciprofloxacin (Cipro) 500 MG tablet Take 1 tablet by mouth 2 (Two) Times a Day. 10 tablet 0   • doxycycline (VIBRAMYCIN) 100 MG capsule Take 1 capsule by mouth 2 (Two) Times a Day. 20 capsule 0   • pravastatin (PRAVACHOL) 20 MG tablet Take 1 tablet by mouth Every Night. 90 tablet 1   • predniSONE (DELTASONE) 10 MG tablet Take 1 tablet by mouth See Admin Instructions. Take 1 Tab Tid x3 days, Then 1 Tab Bid x 2 days Then 1 Tab QD x4 days,then D/C 17 tablet 0     No facility-administered medications prior to visit.       Patient Active Problem List   Diagnosis   • Simple chronic bronchitis (CMS/HCC)   • Environmental allergies   • Right-sided low back pain with right-sided sciatica   • Overweight (BMI 25.0-29.9)   • Stage 2 moderate COPD by GOLD classification (CMS/HCC)   • Adenopathy, cervical   • High risk medication use   • Chronic cervical radiculopathy   • Incisional hernia, without obstruction or gangrene   • Diabetes mellitus screening   • Hyperlipidemia   • Hyperglycemia   • Polyuria   • H/O umbilical hernia repair   • Former smoker   • Arthritis  "  • Actinic keratosis   • Routine medical exam   • Foot pain, bilateral   • Insomnia   • Bilateral foot pain   • Lumbago with sciatica   • Chronic fatigue   • Dyspnea on exertion   • Right-sided chest pain   • Personal history of tobacco use, presenting hazards to health   • Chronic allergic rhinitis   • Need for immunization against influenza       Advanced Care Planning:  ACP discussion was held with the patient during this visit. Patient has an advance directive (not in EMR), copy requested.    Review of Systems    Compared to one year ago, the patient feels his physical health is better.  Compared to one year ago, the patient feels his mental health is the same.    Reviewed chart for potential of high risk medication in the elderly: yes  Reviewed chart for potential of harmful drug interactions in the elderly:yes    Objective         Vitals:    07/08/21 0903   BP: 122/70   BP Location: Left arm   Patient Position: Sitting   Cuff Size: Adult   Pulse: 84   Temp: 98 °F (36.7 °C)   TempSrc: Temporal   SpO2: 94%   Weight: 80.2 kg (176 lb 14.4 oz)   Height: 167.6 cm (66\")   PainSc:   5   PainLoc: Shoulder       Body mass index is 28.55 kg/m².  Discussed the patient's BMI with him. The BMI is above average; BMI management plan is completed.    Physical Exam          Assessment/Plan   Medicare Risks and Personalized Health Plan  CMS Preventative Services Quick Reference  Abdominal Aortic Aneurysm Screening  Advance Directive Discussion  Cardiovascular risk  Chronic Pain   Fall Risk  Lung Cancer Risk  Obesity/Overweight     The above risks/problems have been discussed with the patient.  Pertinent information has been shared with the patient in the After Visit Summary.  Follow up plans and orders are seen below in the Assessment/Plan Section.    Diagnoses and all orders for this visit:    1. Medicare annual wellness visit, subsequent (Primary)      Follow Up:  Return in about 1 year (around 7/8/2022) for Medicare Wellness " Subsequent.     An After Visit Summary and PPPS were given to the patient.           This document has been electronically signed by Buzz Sifuentes MD on July 8, 2021

## 2021-07-08 NOTE — PATIENT INSTRUCTIONS
Exercising to Stay Healthy  To become healthy and stay healthy, it is recommended that you do moderate-intensity and vigorous-intensity exercise. You can tell that you are exercising at a moderate intensity if your heart starts beating faster and you start breathing faster but can still hold a conversation. You can tell that you are exercising at a vigorous intensity if you are breathing much harder and faster and cannot hold a conversation while exercising.  Exercising regularly is important. It has many health benefits, such as:  · Improving overall fitness, flexibility, and endurance.  · Increasing bone density.  · Helping with weight control.  · Decreasing body fat.  · Increasing muscle strength.  · Reducing stress and tension.  · Improving overall health.  How often should I exercise?  Choose an activity that you enjoy, and set realistic goals. Your health care provider can help you make an activity plan that works for you.  Exercise regularly as told by your health care provider. This may include:  · Doing strength training two times a week, such as:  ? Lifting weights.  ? Using resistance bands.  ? Push-ups.  ? Sit-ups.  ? Yoga.  · Doing a certain intensity of exercise for a given amount of time. Choose from these options:  ? A total of 150 minutes of moderate-intensity exercise every week.  ? A total of 75 minutes of vigorous-intensity exercise every week.  ? A mix of moderate-intensity and vigorous-intensity exercise every week.  Children, pregnant women, people who have not exercised regularly, people who are overweight, and older adults may need to talk with a health care provider about what activities are safe to do. If you have a medical condition, be sure to talk with your health care provider before you start a new exercise program.  What are some exercise ideas?  Moderate-intensity exercise ideas include:  · Walking 1 mile (1.6 km) in about 15  minutes.  · Biking.  · Hiking.  · Golfing.  · Dancing.  · Water aerobics.  Vigorous-intensity exercise ideas include:  · Walking 4.5 miles (7.2 km) or more in about 1 hour.  · Jogging or running 5 miles (8 km) in about 1 hour.  · Biking 10 miles (16.1 km) or more in about 1 hour.  · Lap swimming.  · Roller-skating or in-line skating.  · Cross-country skiing.  · Vigorous competitive sports, such as football, basketball, and soccer.  · Jumping rope.  · Aerobic dancing.  What are some everyday activities that can help me to get exercise?  · Yard work, such as:  ? Pushing a .  ? Raking and bagging leaves.  · Washing your car.  · Pushing a stroller.  · Shoveling snow.  · Gardening.  · Washing windows or floors.  How can I be more active in my day-to-day activities?  · Use stairs instead of an elevator.  · Take a walk during your lunch break.  · If you drive, park your car farther away from your work or school.  · If you take public transportation, get off one stop early and walk the rest of the way.  · Stand up or walk around during all of your indoor phone calls.  · Get up, stretch, and walk around every 30 minutes throughout the day.  · Enjoy exercise with a friend. Support to continue exercising will help you keep a regular routine of activity.  What guidelines can I follow while exercising?  · Before you start a new exercise program, talk with your health care provider.  · Do not exercise so much that you hurt yourself, feel dizzy, or get very short of breath.  · Wear comfortable clothes and wear shoes with good support.  · Drink plenty of water while you exercise to prevent dehydration or heat stroke.  · Work out until your breathing and your heartbeat get faster.  Where to find more information  · [place] Department of Health and Human Services: www.hhs.gov  · Centers for Disease Control and Prevention (CDC): www.cdc.gov  Summary  · Exercising regularly is important. It will improve your overall fitness,  flexibility, and endurance.  · Regular exercise also will improve your overall health. It can help you control your weight, reduce stress, and improve your bone density.  · Do not exercise so much that you hurt yourself, feel dizzy, or get very short of breath.  · Before you start a new exercise program, talk with your health care provider.  This information is not intended to replace advice given to you by your health care provider. Make sure you discuss any questions you have with your health care provider.  Document Revised: 11/30/2018 Document Reviewed: 11/08/2018  [employer]vier Patient Education © 2021 [employer]  Fall Prevention in the Home, Adult  Falls can cause injuries and can affect people from all age groups. There are many simple things that you can do to make your home safe and to help prevent falls. Ask for help when making these changes, if needed.  What actions can I take to prevent falls?  General instructions  · Use good lighting in all rooms. Replace any light bulbs that burn out.  · Turn on lights if it is dark. Use night-lights.  · Place frequently used items in easy-to-reach places. Lower the shelves around your home if necessary.  · Set up furniture so that there are clear paths around it. Avoid moving your furniture around.  · Remove throw rugs and other tripping hazards from the floor.  · Avoid walking on wet floors.  · Fix any uneven floor surfaces.  · Add color or contrast paint or tape to grab bars and handrails in your home. Place contrasting color strips on the first and last steps of stairways.  · When you use a stepladder, make sure that it is completely opened and that the sides are firmly locked. Have someone hold the ladder while you are using it. Do not climb a closed stepladder.  · Be aware of any and all pets.  What can I do in the bathroom?         · Keep the floor dry. Immediately clean up any water that spills onto the floor.  · Remove soap buildup in the tub or shower on a  regular basis.  · Use non-skid mats or decals on the floor of the tub or shower.  · Attach bath mats securely with double-sided, non-slip rug tape.  · If you need to sit down while you are in the shower, use a plastic, non-slip stool.  · Install grab bars by the toilet and in the tub and shower. Do not use towel bars as grab bars.  What can I do in the bedroom?  · Make sure that a bedside light is easy to reach.  · Do not use oversized bedding that drapes onto the floor.  · Have a firm chair that has side arms to use for getting dressed.  What can I do in the kitchen?  · Clean up any spills right away.  · If you need to reach for something above you, use a sturdy step stool that has a grab bar.  · Keep electrical cables out of the way.  · Do not use floor polish or wax that makes floors slippery. If you must use wax, make sure that it is non-skid floor wax.  What can I do in the stairways?  · Do not leave any items on the stairs.  · Make sure that you have a light switch at the top of the stairs and the bottom of the stairs. Have them installed if you do not have them.  · Make sure that there are handrails on both sides of the stairs. Fix handrails that are broken or loose. Make sure that handrails are as long as the stairways.  · Install non-slip stair treads on all stairs in your home.  · Avoid having throw rugs at the top or bottom of stairways, or secure the rugs with carpet tape to prevent them from moving.  · Choose a carpet design that does not hide the edge of steps on the stairway.  · Check any carpeting to make sure that it is firmly attached to the stairs. Fix any carpet that is loose or worn.  What can I do on the outside of my home?  · Use bright outdoor lighting.  · Regularly repair the edges of walkways and driveways and fix any cracks.  · Remove high doorway thresholds.  · Trim any shrubbery on the main path into your home.  · Regularly check that handrails are securely fastened and in good repair.  Both sides of any steps should have handrails.  · Install guardrails along the edges of any raised decks or porches.  · Clear walkways of debris and clutter, including tools and rocks.  · Have leaves, snow, and ice cleared regularly.  · Use sand or salt on walkways during winter months.  · In the garage, clean up any spills right away, including grease or oil spills.  What other actions can I take?  · Wear closed-toe shoes that fit well and support your feet. Wear shoes that have rubber soles or low heels.  · Use mobility aids as needed, such as canes, walkers, scooters, and crutches.  · Review your medicines with your health care provider. Some medicines can cause dizziness or changes in blood pressure, which increase your risk of falling.  Talk with your health care provider about other ways that you can decrease your risk of falls. This may include working with a physical therapist or  to improve your strength, balance, and endurance.  Where to find more information  · Centers for Disease Control and Prevention, STEADI: https://www.cdc.gov  · National [place] on Aging: https://fl5btja.miles.nih.gov  Contact a health care provider if:  · You are afraid of falling at home.  · You feel weak, drowsy, or dizzy at home.  · You fall at home.  Summary  · There are many simple things that you can do to make your home safe and to help prevent falls.  · Ways to make your home safe include removing tripping hazards and installing grab bars in the bathroom.  · Ask for help when making these changes in your home.  This information is not intended to replace advice given to you by your health care provider. Make sure you discuss any questions you have with your health care provider.  Document Revised: 11/30/2018 Document Reviewed: 08/02/2018  [employer] Patient Education © 2021 [employer]    Medicare Wellness  Personal Prevention Plan of Service     Date of Office Visit:  07/08/2021  Encounter Provider:  Buzz Petersen  MD Bear  Place of Service:  [facility]  Patient Name: Yehuda Rutledge  :  1950    As part of the Medicare Wellness portion of your visit today, we are providing you with this personalized preventive plan of services (PPPS). This plan is based upon recommendations of the [place] Preventive Services Task Force (USPSTF) and the Advisory Committee on Immunization Practices (ACIP).    This lists the preventive care services that should be considered, and provides dates of when you are due. Items listed as completed are up-to-date and do not require any further intervention.    Health Maintenance   Topic Date Due   • ZOSTER VACCINE (2 of 2) 2018   • LUNG CANCER SCREENING  2020   • ANNUAL WELLNESS VISIT  2021   • COVID-19 Vaccine (1) 2022 (Originally 1962)   • INFLUENZA VACCINE  2021   • LIPID PANEL  2021   • TDAP/TD VACCINES (2 - Tdap) 2025   • COLORECTAL CANCER SCREENING  2025   • HEPATITIS C SCREENING  Completed   • Pneumococcal Vaccine 65+  Completed   • AAA SCREEN (ONE-TIME)  Completed       No orders of the defined types were placed in this encounter.      Return in about 1 year (around 2022) for Medicare Wellness Subsequent.

## 2021-07-15 ENCOUNTER — TELEPHONE (OUTPATIENT)
Dept: FAMILY MEDICINE CLINIC | Facility: CLINIC | Age: 71
End: 2021-07-15

## 2021-07-16 ENCOUNTER — OFFICE VISIT (OUTPATIENT)
Dept: FAMILY MEDICINE CLINIC | Facility: CLINIC | Age: 71
End: 2021-07-16

## 2021-07-16 VITALS
BODY MASS INDEX: 28.35 KG/M2 | TEMPERATURE: 97.8 F | HEIGHT: 66 IN | WEIGHT: 176.4 LBS | HEART RATE: 72 BPM | DIASTOLIC BLOOD PRESSURE: 72 MMHG | OXYGEN SATURATION: 96 % | SYSTOLIC BLOOD PRESSURE: 128 MMHG

## 2021-07-16 DIAGNOSIS — M54.42 CHRONIC BILATERAL LOW BACK PAIN WITH BILATERAL SCIATICA: ICD-10-CM

## 2021-07-16 DIAGNOSIS — M54.41 CHRONIC BILATERAL LOW BACK PAIN WITH BILATERAL SCIATICA: ICD-10-CM

## 2021-07-16 DIAGNOSIS — J30.9 CHRONIC ALLERGIC RHINITIS: ICD-10-CM

## 2021-07-16 DIAGNOSIS — M54.12 CHRONIC CERVICAL RADICULOPATHY: ICD-10-CM

## 2021-07-16 DIAGNOSIS — E78.5 HYPERLIPIDEMIA, UNSPECIFIED HYPERLIPIDEMIA TYPE: ICD-10-CM

## 2021-07-16 DIAGNOSIS — G89.29 CHRONIC BILATERAL LOW BACK PAIN WITH BILATERAL SCIATICA: ICD-10-CM

## 2021-07-16 DIAGNOSIS — L85.8 CUTANEOUS HORN: Primary | ICD-10-CM

## 2021-07-16 PROCEDURE — 99214 OFFICE O/P EST MOD 30 MIN: CPT | Performed by: FAMILY MEDICINE

## 2021-07-16 PROCEDURE — 17110 DESTRUCTION B9 LES UP TO 14: CPT | Performed by: FAMILY MEDICINE

## 2021-07-16 RX ORDER — GABAPENTIN 100 MG/1
100 CAPSULE ORAL 2 TIMES DAILY
Qty: 60 CAPSULE | Refills: 3 | Status: SHIPPED | OUTPATIENT
Start: 2021-07-16 | End: 2021-11-17 | Stop reason: SDUPTHER

## 2021-07-16 NOTE — PROGRESS NOTES
Chief Complaint  Neck Pain, Allergies, Hyperlipidemia, Arthritis, and Skin Problem (neck area)  'Neck lesion, stays irritated , gets cut with razor shaving, would like removed'.  Subjective          Review of Systems   Constitutional: Negative for chills, fatigue and fever.   HENT: Negative for congestion, ear pain, facial swelling, mouth sores, postnasal drip, sinus pressure and sore throat.    Eyes: Negative for pain, discharge, itching and visual disturbance.   Respiratory: Negative for cough, chest tightness, shortness of breath and wheezing.    Cardiovascular: Negative for chest pain, palpitations and leg swelling.   Gastrointestinal: Negative for abdominal pain, blood in stool, constipation and diarrhea.        Abd pain resolved after hernia repair.   Endocrine: Negative.  Negative for cold intolerance and heat intolerance.   Genitourinary: Negative.  Negative for difficulty urinating, dysuria, hematuria and urgency.   Musculoskeletal: Positive for arthralgias, arthritis, back pain and neck pain. Negative for myalgias.        Neck and Back pain controlled with Neurontin   Skin: Negative.  Negative for color change, pallor and wound.        Warty lesion on neck   Allergic/Immunologic: Positive for environmental allergies. Negative for food allergies and immunocompromised state.   Neurological: Positive for headaches. Negative for tremors and speech difficulty.   Hematological: Negative for adenopathy. Does not bruise/bleed easily.   Psychiatric/Behavioral: Negative for agitation, dysphoric mood and sleep disturbance. The patient has insomnia. The patient is not nervous/anxious and is not hyperactive.        Yehuda Rutledge presents to [facility]   Warty skin lesion R neck under chin gets cut when shaving.     Hyperlipidemia  This is a chronic problem. The current episode started more than 1 year ago. The problem is controlled. Recent lipid tests were reviewed and are variable.  Pertinent negatives include no chest pain, myalgias or shortness of breath. Current antihyperlipidemic treatment includes statins. The current treatment provides mild improvement of lipids. Risk factors for coronary artery disease include hypertension and dyslipidemia.   Allergies  This is a chronic problem. The current episode started more than 1 year ago. The problem occurs daily. The problem has been gradually improving. Associated symptoms include arthralgias, headaches and neck pain. Pertinent negatives include no abdominal pain, chest pain, chills, congestion, coughing, fatigue, fever, myalgias or sore throat.   Neck Pain   This is a chronic problem. The current episode started more than 1 year ago. The problem occurs intermittently. The problem has been waxing and waning. The pain is at a severity of 3/10. The pain is mild. Associated symptoms include headaches. Pertinent negatives include no chest pain or fever. He has tried NSAIDs and muscle relaxants for the symptoms. The treatment provided mild relief.   Arthritis  Presents for follow-up visit. Affected location: multiple joints. His pain is at a severity of 5/10. Pertinent negatives include no diarrhea, dysuria, fatigue or fever.   COPD  There is no cough, shortness of breath or wheezing. The current episode started more than 1 year ago. The problem occurs intermittently. Associated symptoms include headaches. Pertinent negatives include no chest pain, ear pain, fever, myalgias, postnasal drip or sore throat. His symptoms are alleviated by beta-agonist. He reports moderate improvement on treatment. His past medical history is significant for COPD.   Insomnia  This is a chronic problem. The current episode started more than 1 year ago. The problem occurs intermittently. Associated symptoms include arthralgias, headaches and neck pain. Pertinent negatives include no abdominal pain, chest pain, chills, congestion, coughing, fatigue, fever, myalgias or sore  "throat. He has tried rest and sleep (Neurontin) for the symptoms. The treatment provided moderate relief.   Fatigue  This is a chronic (improved.) problem. The current episode started more than 1 year ago. The problem occurs daily. The problem has been waxing and waning. Associated symptoms include arthralgias, headaches and neck pain. Pertinent negatives include no abdominal pain, chest pain, chills, congestion, coughing, fatigue, fever, myalgias or sore throat.   Headache   The current episode started more than 1 month ago. The problem has been resolved. The pain is at a severity of 0/10. The patient is experiencing no pain. Associated symptoms include back pain, insomnia and neck pain. Pertinent negatives include no abdominal pain, coughing, ear pain, eye pain, fever, sinus pressure or sore throat. He has tried acetaminophen and NSAIDs for the symptoms. The treatment provided significant relief.       Objective   Vital Signs:   /72 (BP Location: Left arm, Patient Position: Sitting, Cuff Size: Adult)   Pulse 72   Temp 97.8 °F (36.6 °C) (Temporal)   Ht 167.6 cm (66\")   Wt 80 kg (176 lb 6.4 oz)   SpO2 96%   BMI 28.47 kg/m²     Physical Exam  Vitals and nursing note reviewed.   Constitutional:       General: He is not in acute distress.     Appearance: Normal appearance. He is normal weight. He is not ill-appearing.   HENT:      Head: Normocephalic and atraumatic.      Right Ear: Tympanic membrane, ear canal and external ear normal.      Left Ear: Tympanic membrane, ear canal and external ear normal.      Nose: No congestion.      Right Sinus: Maxillary sinus tenderness present.      Left Sinus: Maxillary sinus tenderness present.   Eyes:      Conjunctiva/sclera: Conjunctivae normal.      Pupils: Pupils are equal, round, and reactive to light.   Cardiovascular:      Rate and Rhythm: Normal rate and regular rhythm.      Heart sounds: Normal heart sounds.   Pulmonary:      Effort: Pulmonary effort is " normal.      Breath sounds: Normal breath sounds. No wheezing or rhonchi.   Musculoskeletal:         General: Normal range of motion.      Cervical back: Normal range of motion and neck supple.   Lymphadenopathy:      Cervical: No cervical adenopathy.   Skin:     General: Skin is warm and dry.      Comments: 4 mm Lesion with keratotic horn R ant neck.   Neurological:      General: No focal deficit present.      Mental Status: He is alert and oriented to person, place, and time.   Psychiatric:         Mood and Affect: Mood normal.         Behavior: Behavior normal.        Result Review :          Discussed risk benefit of skin lesion destruction, Pt desires Tx.   Destruction of Lesion    Date/Time: 7/16/2021 8:54 AM  Performed by: Buzz Sifuentes MD  Authorized by: Buzz Sifuentes MD   Local anesthesia used: yes  Anesthesia: local infiltration    Anesthesia:  Local anesthesia used: yes  Local Anesthetic: lidocaine 1% without epinephrine  Anesthetic total: 0.25 mL  Patient tolerance: patient tolerated the procedure well with no immediate complications  Comments: Lesion 4 mm horned AK  Cleansed , numbed at base, destroyed with Hyfrecator.            Assessment and Plan    Diagnoses and all orders for this visit:    1. Cutaneous horn (Primary)  -     Destruction of Lesion    2. Chronic cervical radiculopathy  -     gabapentin (NEURONTIN) 100 MG capsule; Take 1 capsule by mouth 2 (Two) Times a Day.  Dispense: 60 capsule; Refill: 3    3. Chronic bilateral low back pain with bilateral sciatica  -     gabapentin (NEURONTIN) 100 MG capsule; Take 1 capsule by mouth 2 (Two) Times a Day.  Dispense: 60 capsule; Refill: 3    4. Hyperlipidemia, unspecified hyperlipidemia type  -     pravastatin (PRAVACHOL) 20 MG tablet; Take 1 tablet by mouth Every Night.  Dispense: 90 tablet; Refill: 1    5. Chronic allergic rhinitis  -     fluticasone (FLONASE) 50 MCG/ACT nasal spray; 2 sprays into the nostril(s) as directed by  provider Daily. Administer 2 sprays in each nostril for each dose.  Dispense: 18.2 mL; Refill: 5    Other orders  -     cetirizine (zyrTEC) 10 MG tablet; Take 1 tablet by mouth Daily.  Dispense: 90 tablet; Refill: 1        Follow Up   Return in about 3 months (around 10/16/2021).  Patient was given instructions and counseling regarding his condition or for health maintenance advice. Please see specific information pulled into the AVS if appropriate.           This document has been electronically signed by Buzz Sifuentes MD on July 17, 2021

## 2021-07-17 RX ORDER — CETIRIZINE HYDROCHLORIDE 10 MG/1
10 TABLET ORAL DAILY
Qty: 90 TABLET | Refills: 1 | Status: SHIPPED | OUTPATIENT
Start: 2021-07-17 | End: 2021-12-03 | Stop reason: SDUPTHER

## 2021-07-17 RX ORDER — FLUTICASONE PROPIONATE 50 MCG
2 SPRAY, SUSPENSION (ML) NASAL DAILY
Qty: 18.2 ML | Refills: 5 | Status: SHIPPED | OUTPATIENT
Start: 2021-07-17 | End: 2021-12-03 | Stop reason: SDUPTHER

## 2021-07-17 RX ORDER — PRAVASTATIN SODIUM 20 MG
20 TABLET ORAL NIGHTLY
Qty: 90 TABLET | Refills: 1 | Status: SHIPPED | OUTPATIENT
Start: 2021-07-17 | End: 2021-12-03 | Stop reason: SDUPTHER

## 2021-07-22 ENCOUNTER — TELEPHONE (OUTPATIENT)
Dept: FAMILY MEDICINE CLINIC | Facility: CLINIC | Age: 71
End: 2021-07-22

## 2021-07-22 RX ORDER — LEVOFLOXACIN 500 MG/1
500 TABLET, FILM COATED ORAL DAILY
Qty: 5 TABLET | Refills: 0 | Status: SHIPPED | OUTPATIENT
Start: 2021-07-22 | End: 2021-10-14

## 2021-07-22 RX ORDER — PREDNISONE 10 MG/1
10 TABLET ORAL SEE ADMIN INSTRUCTIONS
Qty: 17 TABLET | Refills: 0 | Status: SHIPPED | OUTPATIENT
Start: 2021-07-22 | End: 2021-10-14

## 2021-07-22 NOTE — TELEPHONE ENCOUNTER
Pt called stating ears are stopped up and having sinus issues again Would like to know if he needed an appointment or if you would be willing to send in something for him.

## 2021-10-14 ENCOUNTER — OFFICE VISIT (OUTPATIENT)
Dept: GASTROENTEROLOGY | Facility: CLINIC | Age: 71
End: 2021-10-14

## 2021-10-14 VITALS
DIASTOLIC BLOOD PRESSURE: 91 MMHG | WEIGHT: 178.4 LBS | BODY MASS INDEX: 29.72 KG/M2 | HEART RATE: 79 BPM | SYSTOLIC BLOOD PRESSURE: 164 MMHG | HEIGHT: 65 IN

## 2021-10-14 DIAGNOSIS — Z12.11 ENCOUNTER FOR SCREENING FOR MALIGNANT NEOPLASM OF COLON: Primary | ICD-10-CM

## 2021-10-14 PROCEDURE — S0260 H&P FOR SURGERY: HCPCS | Performed by: INTERNAL MEDICINE

## 2021-10-14 RX ORDER — DEXTROSE AND SODIUM CHLORIDE 5; .45 G/100ML; G/100ML
30 INJECTION, SOLUTION INTRAVENOUS CONTINUOUS PRN
Status: CANCELLED | OUTPATIENT
Start: 2021-11-08

## 2021-10-14 RX ORDER — SODIUM, POTASSIUM,MAG SULFATES 17.5-3.13G
1 SOLUTION, RECONSTITUTED, ORAL ORAL EVERY 12 HOURS
Qty: 1 ML | Refills: 0 | Status: SHIPPED | OUTPATIENT
Start: 2021-10-14 | End: 2021-11-05
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
## 2021-10-14 NOTE — PATIENT INSTRUCTIONS
Colonoscopy, Adult  A colonoscopy is an exam to look at the large intestine. It is done using a long, thin, flexible tube that has a camera on the end.  This exam is done to check for problems, such as:  · Lumps (tumors).  · Growths (polyps).  · Irritation and swelling (inflammation).  · Bleeding.  Tell your doctor about:  · Any allergies you have.  · All medicines you are taking. Tell him or her about vitamins, herbs, eye drops, creams, and over-the-counter medicines.  · Any problems you or family members have had with anesthetic medicines.  · Any blood disorders you have.  · Any surgeries you have had.  · Any medical conditions you have.  · Whether you are pregnant or may be pregnant.  · Any problems you have had with pooping (having bowel movements).  What are the risks?  Generally, this is a safe procedure. However, problems may occur, such as:  · Bleeding.  · Damage to your intestine.  · Allergic reactions to medicines given during the procedure.  · Infection. This is rare.  What happens before the procedure?  Eating and drinking  Follow instructions from your doctor about eating and drinking. These may include:  · A few days before the procedure:  ? Follow a low-fiber diet.  ? Avoid these foods:  § Nuts.  § Seeds.  § Dried fruit.  § Raw fruits.  § Vegetables.  · 1-3 days before the procedure:  ? Eat only gelatin dessert or ice pops.  ? Drink only clear liquids, such as:  § Water.  § Clear broth or bouillon.  § Black coffee or tea.  § Clear juice.  § Clear soft drinks or sports drinks.  ? Avoid liquids that have red or purple dye.  · On the day of the procedure:  ? Do not eat solid foods. You may continue to drink clear liquids until up to 2 hours before the procedure.  ? Do not eat or drink anything starting 2 hours before the procedure or as told by your doctor.  Bowel prep  If you were prescribed a bowel prep to take by mouth (orally) to clean out your colon:  · Take it as told by your doctor. Starting the  day before your procedure, you will need to drink a lot of liquid medicine. The liquid will cause you to poop until your poop is almost clear or light green.  · If your skin or butt gets irritated from diarrhea, you may:  ? Wipe the area with wipes that have medicine in them, such as adult wet wipes with aloe and vitamin E.  ? Put something on your skin that soothes the area, such as petroleum jelly.  · If you vomit while drinking the bowel prep:  ? Take a break for up to 60 minutes.  ? Begin the bowel prep again.  ? Call your doctor if you keep vomiting and you cannot take the bowel prep without vomiting.  · To clean out your colon, you may also be given:  ? Laxative medicines. These help you poop.  ? Instructions for using a liquid medicine (enema) injected into your butt.  Medicines  Ask your doctor about:  · Changing or stopping your normal medicines. This is important.  · Taking aspirin and ibuprofen. Do not take these medicines unless your doctor tells you to take them.  · Taking over-the-counter medicines, vitamins, herbs, and supplements.  General instructions  · Ask your doctor what steps will be taken to help prevent the spread of germs. These may include washing skin with a germ-killing soap.  · Plan to have someone take you home from the hospital or clinic.  What happens during the procedure?    · An IV tube may be put into one of your veins.  · You may be given one or more of the following:  ? A medicine to help you relax (sedative).  ? A medicine to numb the area (local anesthetic).  ? A medicine to make you fall asleep (general anesthetic). This is rarely needed.  · You will lie on your side with your knees bent.  · The tube will:  ? Have oil or gel put on it.  ? Be put into the opening of the butt (anus).  ? Be gently put into your large intestine.  · Air will be sent into your colon to keep it open. You may feel some pressure or cramping.  · The camera will be used to take photos that will appear on  a screen.  · A small tissue sample may be removed for testing (biopsy).  · If small growths are found, your doctor may remove them and have them checked for cancer.  · The tube will be slowly removed.  The procedure may vary among doctors and hospitals.  What happens after the procedure?  · You will be monitored until you leave the hospital or clinic. This includes checking your:  ? Blood pressure.  ? Heart rate.  ? Breathing rate.  ? Blood oxygen level.  · You may have a small amount of blood in your poop.  · You may pass gas.  · You may have mild cramps or bloating in your belly (abdomen).  · Do not drive for 24 hours after the procedure.  · It is up to you to get the results of your procedure. Ask your doctor, or the department that is doing the procedure, when your results will be ready.  Summary  · A colonoscopy is an exam to look at the large intestine.  · Follow instructions from your doctor about eating and drinking before the procedure.  · You may be prescribed an oral bowel prep to clean out your colon. Take it as told by your doctor.  · A flexible tube with a camera on its end will be put into the opening of the butt. It will be passed into the large intestine.  · You will be monitored until you leave the hospital or clinic.  This information is not intended to replace advice given to you by your health care provider. Make sure you discuss any questions you have with your health care provider.  Document Revised: 07/10/2020 Document Reviewed: 07/10/2020  [employer] Patient Education © 2021 [employer]

## 2021-10-18 ENCOUNTER — TELEPHONE (OUTPATIENT)
Dept: GASTROENTEROLOGY | Facility: CLINIC | Age: 71
End: 2021-10-18

## 2021-10-18 NOTE — TELEPHONE ENCOUNTER
"10/18/2021, 1345 - Patient telephone call returned per this staff member [phone].  Zero answer.  Voice message submitted with date, time, office contact information, and notification bowel [facility] (10/14/[facility]  Patient instructed to contact office with questions.    10/18/2021, 1347 - Patient telephoned per this staff member [phone].  Zero answer.  Zero option to submit voice message - \"voice mail box not set up yet\".  "

## 2021-10-18 NOTE — TELEPHONE ENCOUNTER
----- Message from Alfredo Gibbons sent at 10/18/2021  1:38 PM CDT -----  Called wanting to know if his prep has been called in yet for his procedure for 11/08/21 with [facility]  Said that he uses [facility] [place], [place]. Doesn't want to wait to last minute to get it.

## 2021-10-20 ENCOUNTER — TELEPHONE (OUTPATIENT)
Dept: GASTROENTEROLOGY | Facility: CLINIC | Age: 71
End: 2021-10-20

## 2021-10-20 NOTE — TELEPHONE ENCOUNTER
----- Message from Alfredo Gibbons sent at 10/20/2021 10:28 AM CDT -----  Contact: [phone]  Called to say that medication has still not been received by [facility] [phone]. Could you please give him a phone call @ [phone].

## 2021-10-20 NOTE — TELEPHONE ENCOUNTER
10/20/2021, 1057 - Patient telephone call returned per this staff member [phone].  [facility] stated [facility] not have received a prescription for Suprep Bowel Preparation.  Patient made aware Suprep Bowel Preparation was submitted to [facility] 10/14/2021.  Patient made aware this staff member will contact pharmacy.    10/20/2021, 1059 - [facility] telephoned per this staff member [phone]. Spoke with Pharmacy Technician, Kendell.  Kendell confirmed pharmacy does have prescription for Suprep Bowel Preparation, it will be available for patient collection at 12:30 P.M. today, and has a cost of $111.00.    10/20/2021, 1104 - Patient telephoned per this staff member [phone] with notification [facility] does have prescription for Suprep Bowel Preparation, it will be available for collection today at 12:30 P.M., and has a cost of $111.00.  Patient verbalized understanding.

## 2021-10-25 ENCOUNTER — OFFICE VISIT (OUTPATIENT)
Dept: CARDIOLOGY | Facility: CLINIC | Age: 71
End: 2021-10-25

## 2021-10-25 VITALS
SYSTOLIC BLOOD PRESSURE: 138 MMHG | HEART RATE: 83 BPM | BODY MASS INDEX: 29.82 KG/M2 | WEIGHT: 179 LBS | HEIGHT: 65 IN | DIASTOLIC BLOOD PRESSURE: 94 MMHG | OXYGEN SATURATION: 99 %

## 2021-10-25 DIAGNOSIS — I10 HYPERTENSION, UNSPECIFIED TYPE: Primary | ICD-10-CM

## 2021-10-25 PROCEDURE — 93000 ELECTROCARDIOGRAM COMPLETE: CPT | Performed by: INTERNAL MEDICINE

## 2021-10-25 PROCEDURE — 99214 OFFICE O/P EST MOD 30 MIN: CPT | Performed by: INTERNAL MEDICINE

## 2021-10-25 NOTE — PROGRESS NOTES
[facility] Cardiology  OFFICE NOTE    Cardiovascular Medicine  Ava Colin M.D., RPVI         No referring provider defined for this encounter.    Thank you for asking me to see Yehuda Rutledge for chest pain.    History of Present Illness  This is a 71 y.o. male with:    1.  Elevated blood pressure diagnosis of hypertension   2.  Hyperlipidemia  3.  Chronic lung disease  4.  Neuropathy  5.  Chest pain  6. CKD    Yehuda Rutledge is a 71 y.o. male who presents for consultation today.  Patient is here for further evaluation for shortness of breath, this is new in onset over the last several months, he reported he will get short of breath on mild exertion, he has good days and bad days he has noticed it particularly after meals he is short of breath after walking for 5 minutes.  He has to sit down to catch his breath.  Times on exertion he is also having heaviness which is across his chest, which resolves when he is resting as well.  Again is not happening every time he is exerting but he has good days and bad days.  He is denying orthopnea PND lower extremity swelling or palpitations.  No prior history myocardial infarction.  He does have elevated blood pressure in office without diagnosis of hypertension.  He also has hyperlipidemia and is a previous smoker.  Denies any family history premature coronary artery disease    10/25/2021:  No acute issues since last visit.  Denied any further episodes of chest pain.  Shortness of breath is improved after being seen by pulmonology and has been started on inhalers.    Has been dealing with allergies and sinus problems.  He has been taking some over-the-counter decongestant medications which causes blood pressure to go up.  Denies any chest pain or shortness of breath.      Review of Systems - ROS  Constitution: Negative for weakness, weight gain and weight loss.   HENT: Negative for congestion.    Eyes: Negative for blurred vision.   Cardiovascular: As  mentioned above  Respiratory: Negative for cough and hemoptysis.    Endocrine: Negative for polydipsia and polyuria.   Hematologic/Lymphatic: Negative for bleeding problem. Does not bruise/bleed easily.   Skin: Negative for flushing.   Musculoskeletal: Negative for neck pain and stiffness.   Gastrointestinal: Negative for abdominal pain, diarrhea, jaundice, melena, nausea and vomiting.   Genitourinary: Negative for dysuria and hematuria.   Neurological: Negative for dizziness, focal weakness and numbness.   Psychiatric/Behavioral: Negative for altered mental status and depression.          All other systems were reviewed and were negative.    family history includes Diabetes in his mother.     reports that he quit smoking about 3 years ago. His smoking use included cigarettes. He has a 40.00 pack-year smoking history. He has never used smokeless tobacco. He reports that he does not drink alcohol and does not use drugs.    Allergies   Allergen Reactions   • Penicillins Rash         Current Outpatient Medications:   •  albuterol (PROVENTIL HFA;VENTOLIN HFA) 108 (90 BASE) MCG/ACT inhaler, Inhale 2 puffs Every 4 (Four) Hours As Needed for Shortness of Air or Wheezing. 2 puffs by  Mouth every 4-6 hours  , Disp: , Rfl:   •  Ascorbic Acid (VITAMIN C PO), Take 1 tablet by mouth Daily., Disp: , Rfl:   •  aspirin 81 MG EC tablet, Take 81 mg by mouth daily., Disp: , Rfl:   •  Aspirin-Salicylamide-Caffeine (BC HEADACHE POWDER PO), Take  by mouth As Needed., Disp: , Rfl:   •  cetirizine (zyrTEC) 10 MG tablet, Take 1 tablet by mouth Daily., Disp: 90 tablet, Rfl: 1  •  fluticasone (FLONASE) 50 MCG/ACT nasal spray, 2 sprays into the nostril(s) as directed by provider Daily. Administer 2 sprays in each nostril for each dose., Disp: 18.2 mL, Rfl: 5  •  gabapentin (NEURONTIN) 100 MG capsule, Take 1 capsule by mouth 2 (Two) Times a Day., Disp: 60 capsule, Rfl: 3  •  guaiFENesin (MUCINEX PO), Take  by mouth As Needed., Disp: , Rfl:   •   "Misc Natural Products (OSTEO BI-FLEX JOINT SHIELD PO), Take 1 tablet by mouth Daily., Disp: , Rfl:   •  Misc Natural Products (PROSTATE HEALTH) capsule, Take 1 capsule by mouth 2 (Two) Times a Day., Disp: , Rfl:   •  Multiple Vitamins-Minerals (MULTIVITAMIN ADULT PO), Take 1 tablet by mouth Daily., Disp: , Rfl:   •  Multiple Vitamins-Minerals (ZINC PO), Take 1 tablet by mouth Daily., Disp: , Rfl:   •  Omega-3 Fatty Acids (FISH OIL) 1000 MG capsule capsule, Take 1,000 mg by mouth Daily With Breakfast., Disp: , Rfl:   •  pravastatin (PRAVACHOL) 20 MG tablet, Take 1 tablet by mouth Every Night., Disp: 90 tablet, Rfl: 1  •  sodium chloride 0.65 % nasal spray, 1 spray into the nostril(s) as directed by provider As Needed., Disp: , Rfl:   •  sodium-potassium-magnesium sulfates (SUPREP) 17.5-3.13-1.6 GM/177ML solution oral solution, Take 1 bottle by mouth Every 12 (Twelve) Hours., Disp: 1 mL, Rfl: 0    Physical Exam:  Vitals:    10/25/21 1302   BP: 138/94   BP Location: Left arm   Patient Position: Sitting   Cuff Size: Adult   Pulse: 83   SpO2: 99%   Weight: 81.2 kg (179 lb)   Height: 165.1 cm (65\")   PainSc: 0-No pain     Current Pain Level: none  Pulse Ox: Normal  on room air  General: alert, appears stated age and cooperative     Body Habitus: well-nourished    HEENT: Head: Normocephalic, no lesions, without obvious abnormality. No arcus senilis, xanthelasma or xanthomas.    Neuro: alert, oriented x3  Pulses: 2+ and symmetric  JVP: Volume/Pulsation: Normal.  Normal waveforms.   Appropriate inspiratory decrease.  No Kussmaul's. No Shane's.   Carotid Exam: no bruit normal pulsation bilaterally   Carotid Volume: normal.     Respirations: no increased work of breathing   Chest:  Normal    Pulmonary:Normal   Precordium: Normal impulses. P2 is not palpable.  RV Heave: absent  LV Heave: absent  [place]:  normal size and placement  Palpable S4: absent.  Heart rate: normal    Heart Rhythm: regular     Heart Sounds: S1: normal "  S2: normal  S3: absent   S4: absent  Opening Snap: absent    Pericardial Rub:  Absent: .    Abdomen:   Appearance: normal .  Palpation: Soft, non-tender to palpation, bowel sounds positive in all four quadrants; no guarding or rebound tenderness  Extremity: no edema.   LE Skin: no rashes  LE Hair:  normal  LE Pulses: well perfused with normal pulses in the distal extremities  Pallor on elevation: Absent. Rubor on dependency: None      DATA REVIEWED:     EKG. I personally reviewed and interpreted the EKG.  Normal sinus rhythm with nonspecific T wave changes    ECG/EMG Results (all)     None      CT:   No radiology results for the last 30 days.    --------------------------------------------------------------------------------------------------  LABS:     The 10-year CVD risk score (Alesiaino et al., 2008) is: 37.1%    Values used to calculate the score:      Age: 69 years      Sex: Male      Diabetic: No      Tobacco smoker: No      Systolic Blood Pressure: 140 mmHg      Is BP treated: No      HDL Cholesterol: 29 mg/dL      Total Cholesterol: 222 mg/dL         Lab Results   Component Value Date    GLUCOSE 97 09/25/2020    BUN 11 09/25/2020    CREATININE 1.29 (H) 09/25/2020    EGFRIFNONA 55 (L) 09/25/2020    BCR 8.5 09/25/2020    K 5.0 09/25/2020    CO2 24.2 09/25/2020    CALCIUM 10.2 09/25/2020    ALBUMIN 4.10 09/25/2020    AST 19 09/25/2020    ALT 25 09/25/2020     Lab Results   Component Value Date    WBC 10.03 09/25/2020    HGB 12.7 (L) 09/25/2020    HCT 38.1 09/25/2020    MCV 80.7 09/25/2020     09/25/2020     Lab Results   Component Value Date    CHOL 182 09/25/2020    CHLPL 194 01/13/2016    TRIG 369 (H) 09/25/2020    HDL 36 (L) 09/25/2020    LDL 72 09/25/2020     Lab Results   Component Value Date    TSH 2.240 09/25/2020     No results found for: CKTOTAL, CKMB, CKMBINDEX, TROPONINI, TROPONINT  Lab Results   Component Value Date    HGBA1C 5.9 (H) 03/06/2018     No results found for: DDIMER  Lab  Results   Component Value Date    ALT 25 09/25/2020     Lab Results   Component Value Date    HGBA1C 5.9 (H) 03/06/2018    HGBA1C 5.7 (H) 01/13/2016     Lab Results   Component Value Date    CREATININE 1.29 (H) 09/25/2020     No results found for: IRON, TIBC, FERRITIN  No results found for: INR, PROTIME    Assessment/Plan     1. Hyperlipidemia:  Continue pravastatin.  He checks his labs with his primary care physician. LDL was 72 09/2020, TG elevated at 369. Continue lifestyle modification/pravastatin and repeat TG in a few months    2. Chronic obstructive pulmonary disease, unspecified COPD type (CMS/HCC)  Being managed with primary care physician, PFTS showed moderate obstruction, following pulmonary now.    3. Chest pain: Resolved  With his risk factors, age and symptoms, patient underwent a CTA which was with ca score of 1 and no significant obstructive disease.   Echo with normal LV systolic function and grade I DD    4. SOB (shortness of breath):  Most likely related to COPD. Mild anemia. Echo as above with only grade I DD and normal EF.   CTA as above without evidence of obstructive disease  stopped metoprolol in the setting of COPD, if bp rises consider adding HCTZ with his CHFpEF          Prevention:  Patient's Body mass index is 29.79 kg/m². BMI is within normal parameters. No follow-up required..      Yehuda Rutledge is a former smoker          This document has been electronically signed by Ava Colin MD on October 25, 2021 13:06 CDT

## 2021-10-27 LAB
QT INTERVAL: 370 MS
QTC INTERVAL: 434 MS

## 2021-11-08 ENCOUNTER — ANESTHESIA (OUTPATIENT)
Dept: GASTROENTEROLOGY | Facility: HOSPITAL | Age: 71
End: 2021-11-08

## 2021-11-08 ENCOUNTER — ANESTHESIA EVENT (OUTPATIENT)
Dept: GASTROENTEROLOGY | Facility: HOSPITAL | Age: 71
End: 2021-11-08

## 2021-11-08 ENCOUNTER — HOSPITAL ENCOUNTER (OUTPATIENT)
Facility: HOSPITAL | Age: 71
Setting detail: HOSPITAL OUTPATIENT SURGERY
Discharge: HOME | End: 2021-11-08
Attending: INTERNAL MEDICINE | Admitting: INTERNAL MEDICINE

## 2021-11-08 VITALS
HEART RATE: 90 BPM | HEIGHT: 65 IN | OXYGEN SATURATION: 98 % | RESPIRATION RATE: 21 BRPM | SYSTOLIC BLOOD PRESSURE: 112 MMHG | BODY MASS INDEX: 28.49 KG/M2 | WEIGHT: 171 LBS | DIASTOLIC BLOOD PRESSURE: 80 MMHG | TEMPERATURE: 96.9 F

## 2021-11-08 DIAGNOSIS — Z12.11 ENCOUNTER FOR SCREENING FOR MALIGNANT NEOPLASM OF COLON: ICD-10-CM

## 2021-11-08 PROCEDURE — 25010000002 PROPOFOL 10 MG/ML EMULSION: Performed by: NURSE ANESTHETIST, CERTIFIED REGISTERED

## 2021-11-08 PROCEDURE — G0121 COLON CA SCRN NOT HI RSK IND: HCPCS | Performed by: INTERNAL MEDICINE

## 2021-11-08 RX ORDER — PROPOFOL 10 MG/ML
VIAL (ML) INTRAVENOUS AS NEEDED
Status: DISCONTINUED | OUTPATIENT
Start: 2021-11-08 | End: 2021-11-08 | Stop reason: SURG

## 2021-11-08 RX ORDER — DEXTROSE AND SODIUM CHLORIDE 5; .45 G/100ML; G/100ML
30 INJECTION, SOLUTION INTRAVENOUS CONTINUOUS PRN
Status: DISCONTINUED | OUTPATIENT
Start: 2021-11-08 | End: 2021-11-08 | Stop reason: HOSPADM

## 2021-11-08 RX ORDER — LIDOCAINE HYDROCHLORIDE 20 MG/ML
INJECTION, SOLUTION INTRAVENOUS AS NEEDED
Status: DISCONTINUED | OUTPATIENT
Start: 2021-11-08 | End: 2021-11-08 | Stop reason: SURG

## 2021-11-08 RX ADMIN — LIDOCAINE HYDROCHLORIDE 50 MG: 20 INJECTION, SOLUTION INTRAVENOUS at 15:55

## 2021-11-08 RX ADMIN — PROPOFOL 30 MG: 10 INJECTION, EMULSION INTRAVENOUS at 15:57

## 2021-11-08 RX ADMIN — PROPOFOL 100 MG: 10 INJECTION, EMULSION INTRAVENOUS at 15:55

## 2021-11-08 RX ADMIN — PROPOFOL 30 MG: 10 INJECTION, EMULSION INTRAVENOUS at 16:00

## 2021-11-08 RX ADMIN — DEXTROSE AND SODIUM CHLORIDE 30 ML/HR: 5; 450 INJECTION, SOLUTION INTRAVENOUS at 14:54

## 2021-11-08 NOTE — ANESTHESIA PREPROCEDURE EVALUATION
Anesthesia Evaluation     Patient summary reviewed and Nursing notes reviewed   NPO Solid Status: > 8 hours  NPO Liquid Status: > 8 hours           Airway   Mallampati: II  TM distance: >3 FB  Neck ROM: full  possible difficult intubation  Dental    (+) poor dentation    Comment: Upper and lower plates with lower remaining dentition in poor repair. States no loose remaining lower teeth.    Pulmonary - normal exam   (+) a smoker (Quit two months ago. ) Former, COPD moderate, recent URI resolved,   Wheezes: Expiratory. Albuterol treatment given pre-op.  Cardiovascular - normal exam    Rhythm: regular  Rate: normal    (+) hyperlipidemia,       Neuro/Psych  (+) numbness (Cervical radiculopathy.), psychiatric history Anxiety,     GI/Hepatic/Renal/Endo - negative ROS     Musculoskeletal         ROS comment: Previous back surgery.  Abdominal    Substance History - negative use     OB/GYN negative ob/gyn ROS         Other   arthritis,                        Anesthesia Plan    ASA 3     general     intravenous induction     Anesthetic plan, all risks, benefits, and alternatives have been provided, discussed and informed consent has been obtained with: patient and child.    Plan discussed with CRNA.

## 2021-11-08 NOTE — ANESTHESIA POSTPROCEDURE EVALUATION
Patient: Yehuda Rutledge    Procedure Summary     Date: 11/08/21 Room / Location: [facility] ENDOSCOPY 1 / [facility] ENDOSCOPY    Anesthesia Start: 1548 Anesthesia Stop: 1606    Procedure: COLONOSCOPY (N/A ) Diagnosis:       Encounter for screening for malignant neoplasm of colon      (Encounter for screening for malignant neoplasm of colon [Z12.11])    Surgeons: Dev Campbell MD Provider: Umesh Palacio CRNA    Anesthesia Type: general ASA Status: 3          Anesthesia Type: general    Vitals  No vitals data found for the desired time range.          Post Anesthesia Care and Evaluation    Patient location during evaluation: bedside  Patient participation: complete - patient participated  Level of consciousness: awake and alert  Pain score: 0  Pain management: adequate  Airway patency: patent  Anesthetic complications: No anesthetic complications  PONV Status: none  Cardiovascular status: acceptable  Respiratory status: acceptable  Hydration status: acceptable    Comments: ---------------------------               11/08/21                      1607       ---------------------------   BP:          139/86         Pulse:         98           Resp:          18           Temp:   97.7 °F (36.5 °C)   SpO2:          96%         ---------------------------

## 2021-11-10 ENCOUNTER — NURSE TRIAGE (OUTPATIENT)
Dept: CALL CENTER | Facility: HOSPITAL | Age: 71
End: 2021-11-10

## 2021-11-10 NOTE — TELEPHONE ENCOUNTER
"States he had a colonoscopy on Monday and felt fine yesterday. States today he was working around the [employer] and started feeling bloated.     Denies any blood in stool.     States pain started approximately 4 hours ago, has been constant, and is from below chest to pelvis.     Does not want to go to ER if possible. Contacted switchboard and they stated they would try to connect him with Dr. Madsen. Call transferred with patient and switchboard permission.     Reason for Disposition  • [1] MILD-MODERATE abdomen pain AND [2] constant AND [3] present > 2 hours    Additional Information  • Negative: Shock suspected (e.g., cold/pale/clammy skin, too weak to stand, low BP, rapid pulse)  • Negative: Difficult to awaken or acting confused (e.g., disoriented, slurred speech)  • Negative: Passed out (i.e., lost consciousness, collapsed and was not responding)  • Negative: Sounds like a life-threatening emergency to the triager  • Negative: Breathing difficulty  • Negative: Chest pain  • Negative: [1] Abdomen pain is main concern AND [2] started > 3 days (72 hours) after colonoscopy AND [3] Female  • Negative: [1] Abdomen pain is main concern AND [2] started > 3 days (72 hours) after colonoscopy AND [3] male  • Negative: [1] Vomiting is main concern AND [2] started > 3 days after colonoscopy  • Negative: SEVERE abdomen pain (e.g., excruciating)  • Negative: SEVERE rectal bleeding (large blood clots; on and off, or constant bleeding)  • Negative: SEVERE dizziness (e.g., unable to stand, requires support to walk, feels like passing out now)  • Negative: SEVERE vomiting (e.g., 6 or more times/day)  • Negative: [1] MODERATE rectal bleeding (small blood clots, passing blood without stool, or toilet water turns red) AND [2] more than once    Answer Assessment - Initial Assessment Questions  1. DATE/TIME: \"When did you have your colonoscopy?\"       See note  2. MAIN CONCERN: \"What is your main concern right now?\" \"What questions do " "you have?\"      n/a  3. ABDOMEN PAIN: \"Are you having any abdomen (belly or stomach) pain?\" If Yes, ask: \"How bad is it?\" (e.g., Scale 1-10; mild, moderate, severe).     - MILD (1-3): doesn't interfere with normal activities, abdomen soft and not tender to touch      - MODERATE (4-7): interferes with normal activities or awakens from sleep, tender to touch      - SEVERE (8-10): excruciating pain, doubled over, unable to do any normal activities        n/a  4. OTHER SYMPTOMS: \"What other symptoms are you having?\" (e.g., rectal bleeding, bloating or feeling gassy, passing gas, vomiting, dizziness, fever).      n/a  5. ONSET: \"When did your symptoms start?\"      n/a  6. PATTERN: \"Is the symptom(s) constant or does it come and go?\" \"Is your symptom(s) getting worse, better, or staying the same?\"      N/a    Protocols used: COLONOSCOPY SYMPTOMS AND QUESTIONS-ADULT-AH      "

## 2021-11-16 ENCOUNTER — TELEPHONE (OUTPATIENT)
Dept: FAMILY MEDICINE CLINIC | Facility: CLINIC | Age: 71
End: 2021-11-16

## 2021-11-17 ENCOUNTER — OFFICE VISIT (OUTPATIENT)
Dept: FAMILY MEDICINE CLINIC | Facility: CLINIC | Age: 71
End: 2021-11-17

## 2021-11-17 VITALS
DIASTOLIC BLOOD PRESSURE: 72 MMHG | TEMPERATURE: 97.5 F | HEIGHT: 65 IN | BODY MASS INDEX: 28.99 KG/M2 | SYSTOLIC BLOOD PRESSURE: 122 MMHG | OXYGEN SATURATION: 94 % | WEIGHT: 174 LBS | HEART RATE: 78 BPM

## 2021-11-17 DIAGNOSIS — M54.41 CHRONIC BILATERAL LOW BACK PAIN WITH BILATERAL SCIATICA: ICD-10-CM

## 2021-11-17 DIAGNOSIS — Z79.899 HIGH RISK MEDICATION USE: ICD-10-CM

## 2021-11-17 DIAGNOSIS — M54.42 CHRONIC BILATERAL LOW BACK PAIN WITH BILATERAL SCIATICA: ICD-10-CM

## 2021-11-17 DIAGNOSIS — Z23 NEED FOR IMMUNIZATION AGAINST INFLUENZA: ICD-10-CM

## 2021-11-17 DIAGNOSIS — M54.12 CHRONIC CERVICAL RADICULOPATHY: ICD-10-CM

## 2021-11-17 DIAGNOSIS — K57.92 DIVERTICULITIS: ICD-10-CM

## 2021-11-17 DIAGNOSIS — G89.29 CHRONIC BILATERAL LOW BACK PAIN WITH BILATERAL SCIATICA: ICD-10-CM

## 2021-11-17 PROCEDURE — G0008 ADMIN INFLUENZA VIRUS VAC: HCPCS | Performed by: FAMILY MEDICINE

## 2021-11-17 PROCEDURE — 99214 OFFICE O/P EST MOD 30 MIN: CPT | Performed by: FAMILY MEDICINE

## 2021-11-17 PROCEDURE — 90662 IIV NO PRSV INCREASED AG IM: CPT | Performed by: FAMILY MEDICINE

## 2021-11-17 RX ORDER — CIPROFLOXACIN 500 MG/1
TABLET, FILM COATED ORAL
COMMUNITY
Start: 2021-11-12 | End: 2021-12-03

## 2021-11-17 RX ORDER — GABAPENTIN 100 MG/1
100 CAPSULE ORAL 2 TIMES DAILY
Qty: 60 CAPSULE | Refills: 3 | Status: SHIPPED | OUTPATIENT
Start: 2021-11-17 | End: 2022-04-04 | Stop reason: SDUPTHER

## 2021-11-17 RX ORDER — METRONIDAZOLE 500 MG/1
TABLET ORAL
COMMUNITY
Start: 2021-11-12 | End: 2021-12-03

## 2021-11-17 NOTE — PROGRESS NOTES
Chief Complaint  Hyperlipidemia, Arthritis, COPD, Fatigue, Hospital Follow Up Visit ([facility] ), Shoulder Pain (left), and Diverticulitis    Subjective          Review of Systems   Constitutional: Negative for chills, fatigue and fever.   HENT: Negative for congestion, ear pain, facial swelling, mouth sores, postnasal drip, sinus pressure and sore throat.    Eyes: Negative for pain, discharge, itching and visual disturbance.   Respiratory: Negative for cough, chest tightness, shortness of breath and wheezing.    Cardiovascular: Negative for chest pain, palpitations and leg swelling.   Gastrointestinal: Negative for abdominal pain, blood in stool, constipation and diarrhea.        Recent flare diverticulitis   Endocrine: Negative.  Negative for cold intolerance and heat intolerance.   Genitourinary: Negative.  Negative for difficulty urinating, dysuria, hematuria and urgency.   Musculoskeletal: Positive for arthralgias, arthritis, back pain and neck pain. Negative for myalgias.        Neck and Back pain controlled with Neurontin   Skin: Negative.  Negative for color change, pallor and wound.   Allergic/Immunologic: Positive for environmental allergies. Negative for food allergies and immunocompromised state.   Neurological: Positive for headaches. Negative for tremors and speech difficulty.   Hematological: Negative for adenopathy. Does not bruise/bleed easily.   Psychiatric/Behavioral: Negative for agitation, dysphoric mood and sleep disturbance. The patient has insomnia. The patient is not nervous/anxious and is not hyperactive.        Yehuda Rutledge presents to [facility] [place]   Warty skin lesion R neck under chin gets cut when shaving.     Hyperlipidemia  This is a chronic problem. The current episode started more than 1 year ago. The problem is controlled. Recent lipid tests were reviewed and are variable. Pertinent negatives include no chest pain, myalgias or  shortness of breath. Current antihyperlipidemic treatment includes statins. The current treatment provides mild improvement of lipids. Risk factors for coronary artery disease include hypertension and dyslipidemia.   Allergies  This is a chronic problem. The current episode started more than 1 year ago. The problem occurs daily. The problem has been gradually improving. Associated symptoms include arthralgias, headaches and neck pain. Pertinent negatives include no abdominal pain, chest pain, chills, congestion, coughing, fatigue, fever, myalgias or sore throat.   Neck Pain   This is a chronic problem. The current episode started more than 1 year ago. The problem occurs intermittently. The problem has been waxing and waning. The pain is at a severity of 3/10. The pain is mild. Associated symptoms include headaches. Pertinent negatives include no chest pain or fever. He has tried NSAIDs and muscle relaxants for the symptoms. The treatment provided mild relief.   Arthritis  Presents for follow-up visit. Affected location: multiple joints. His pain is at a severity of 5/10. Pertinent negatives include no diarrhea, dysuria, fatigue or fever.   COPD  There is no cough, shortness of breath or wheezing. The current episode started more than 1 year ago. The problem occurs intermittently. Associated symptoms include headaches. Pertinent negatives include no chest pain, ear pain, fever, myalgias, postnasal drip or sore throat. His symptoms are alleviated by beta-agonist. He reports moderate improvement on treatment. His past medical history is significant for COPD.   Insomnia  This is a chronic problem. The current episode started more than 1 year ago. The problem occurs intermittently. Associated symptoms include arthralgias, headaches and neck pain. Pertinent negatives include no abdominal pain, chest pain, chills, congestion, coughing, fatigue, fever, myalgias or sore throat. He has tried rest and sleep (Neurontin) for the  "symptoms. The treatment provided moderate relief.   Fatigue  This is a chronic (improved.) problem. The current episode started more than 1 year ago. The problem occurs daily. The problem has been waxing and waning. Associated symptoms include arthralgias, headaches and neck pain. Pertinent negatives include no abdominal pain, chest pain, chills, congestion, coughing, fatigue, fever, myalgias or sore throat.   Headache   The current episode started more than 1 month ago. The problem has been resolved. The pain is at a severity of 0/10. The patient is experiencing no pain. Associated symptoms include back pain, insomnia and neck pain. Pertinent negatives include no abdominal pain, coughing, ear pain, eye pain, fever, sinus pressure or sore throat. He has tried acetaminophen and NSAIDs for the symptoms. The treatment provided significant relief.   GI Problem  The primary symptoms include arthralgias. Primary symptoms do not include fever, fatigue, abdominal pain, diarrhea, dysuria or myalgias.   The illness is also significant for back pain. The illness does not include chills or constipation. Significant associated medical issues include diverticulitis.       Objective   Vital Signs:   /72 (BP Location: Right arm, Patient Position: Sitting, Cuff Size: Adult)   Pulse 78   Temp 97.5 °F (36.4 °C) (Temporal)   Ht 165.1 cm (65\")   Wt 78.9 kg (174 lb)   SpO2 94%   BMI 28.96 kg/m²     Physical Exam  Vitals and nursing note reviewed.   Constitutional:       General: He is not in acute distress.     Appearance: He is obese.   HENT:      Head: Atraumatic.      Right Ear: Tympanic membrane, ear canal and external ear normal. There is no impacted cerumen.      Left Ear: Tympanic membrane, ear canal and external ear normal. There is no impacted cerumen.      Nose: No congestion or rhinorrhea.      Mouth/Throat:      Mouth: Mucous membranes are moist.      Pharynx: No oropharyngeal exudate or posterior oropharyngeal " erythema.   Eyes:      Conjunctiva/sclera: Conjunctivae normal.      Pupils: Pupils are equal, round, and reactive to light.   Cardiovascular:      Rate and Rhythm: Normal rate and regular rhythm.      Heart sounds: No murmur heard.  No gallop.    Pulmonary:      Effort: Pulmonary effort is normal.      Breath sounds: Normal breath sounds. No rhonchi.   Abdominal:      General: Bowel sounds are normal.      Palpations: Abdomen is soft.      Tenderness: There is no abdominal tenderness. There is no right CVA tenderness, left CVA tenderness, guarding or rebound.      Hernia: No hernia is present.   Musculoskeletal:         General: Normal range of motion.      Cervical back: Muscular tenderness present.   Skin:     General: Skin is warm and dry.      Capillary Refill: Capillary refill takes 2 to 3 seconds.      Coloration: Skin is not jaundiced.      Findings: No erythema.   Neurological:      General: No focal deficit present.      Mental Status: He is oriented to person, place, and time.      Cranial Nerves: No cranial nerve deficit.   Psychiatric:         Mood and Affect: Mood normal.         Behavior: Behavior normal.         Thought Content: Thought content normal.         Judgment: Judgment normal.        Result Review :                 Assessment and Plan    Diagnoses and all orders for this visit:    1. High risk medication use  -     Urine Drug Screen - Urine, Clean Catch    2. Chronic cervical radiculopathy  -     gabapentin (NEURONTIN) 100 MG capsule; Take 1 capsule by mouth 2 (Two) Times a Day.  Dispense: 60 capsule; Refill: 3  -     XR Spine Cervical Complete 4 or 5 View (In Office)    3. Chronic bilateral low back pain with bilateral sciatica  -     gabapentin (NEURONTIN) 100 MG capsule; Take 1 capsule by mouth 2 (Two) Times a Day.  Dispense: 60 capsule; Refill: 3    4. Need for immunization against influenza  -     Fluzone High-Dose 65+yrs ([id number])    5. Diverticulitis  Comments:  Cipro, Flagyl  Rx'ed        Follow Up   Return in about 2 weeks (around 12/1/2021).  Patient was given instructions and counseling regarding his condition or for health maintenance advice. Please see specific information pulled into the AVS if appropriate.         This document has been electronically signed by Buzz Sifuentes MD

## 2021-11-17 NOTE — PROGRESS NOTES
Injection  Injection performed in right deltoid by Helene Rodríguez MA. Patient tolerated the procedure well without complications.  11/17/21   Helene Rodríguez MA

## 2021-11-18 ENCOUNTER — TELEPHONE (OUTPATIENT)
Dept: FAMILY MEDICINE CLINIC | Facility: CLINIC | Age: 71
End: 2021-11-18

## 2021-11-18 PROBLEM — K57.92 DIVERTICULITIS: Status: ACTIVE | Noted: 2021-11-18

## 2021-11-18 NOTE — TELEPHONE ENCOUNTER
----- Message from Buzz Sifuentes MD sent at 11/18/2021 10:20 AM CST -----  Have arthritis with spurring and DDD in neck will go over at next visit.

## 2021-12-03 ENCOUNTER — OFFICE VISIT (OUTPATIENT)
Dept: FAMILY MEDICINE CLINIC | Facility: CLINIC | Age: 71
End: 2021-12-03

## 2021-12-03 VITALS
HEIGHT: 65 IN | WEIGHT: 169.8 LBS | HEART RATE: 78 BPM | TEMPERATURE: 97.5 F | SYSTOLIC BLOOD PRESSURE: 126 MMHG | DIASTOLIC BLOOD PRESSURE: 70 MMHG | BODY MASS INDEX: 28.29 KG/M2 | OXYGEN SATURATION: 94 %

## 2021-12-03 DIAGNOSIS — M54.42 CHRONIC BILATERAL LOW BACK PAIN WITH BILATERAL SCIATICA: ICD-10-CM

## 2021-12-03 DIAGNOSIS — E78.5 HYPERLIPIDEMIA, UNSPECIFIED HYPERLIPIDEMIA TYPE: ICD-10-CM

## 2021-12-03 DIAGNOSIS — M54.12 CHRONIC CERVICAL RADICULOPATHY: ICD-10-CM

## 2021-12-03 DIAGNOSIS — J30.9 CHRONIC ALLERGIC RHINITIS: ICD-10-CM

## 2021-12-03 DIAGNOSIS — M54.41 CHRONIC BILATERAL LOW BACK PAIN WITH BILATERAL SCIATICA: ICD-10-CM

## 2021-12-03 DIAGNOSIS — G89.29 CHRONIC BILATERAL LOW BACK PAIN WITH BILATERAL SCIATICA: ICD-10-CM

## 2021-12-03 PROCEDURE — 99214 OFFICE O/P EST MOD 30 MIN: CPT | Performed by: FAMILY MEDICINE

## 2021-12-03 RX ORDER — CETIRIZINE HYDROCHLORIDE 10 MG/1
10 TABLET ORAL DAILY
Qty: 90 TABLET | Refills: 1 | Status: SHIPPED | OUTPATIENT
Start: 2021-12-03 | End: 2022-08-11

## 2021-12-03 RX ORDER — PRAVASTATIN SODIUM 20 MG
20 TABLET ORAL NIGHTLY
Qty: 90 TABLET | Refills: 1 | Status: SHIPPED | OUTPATIENT
Start: 2021-12-03 | End: 2022-09-15 | Stop reason: SDUPTHER

## 2021-12-03 RX ORDER — FLUTICASONE PROPIONATE 50 MCG
2 SPRAY, SUSPENSION (ML) NASAL DAILY
Qty: 18.2 ML | Refills: 5 | Status: SHIPPED | OUTPATIENT
Start: 2021-12-03

## 2021-12-03 NOTE — PROGRESS NOTES
Chief Complaint  Diverticulitis and Shoulder Pain  ' Stomach better.  L shoulder and neck hurting, worse than usual'    Subjective          Review of Systems   Constitutional: Negative for chills, fatigue and fever.   HENT: Negative for congestion, ear pain, facial swelling, mouth sores, postnasal drip, sinus pressure and sore throat.    Eyes: Negative for pain, discharge, itching and visual disturbance.   Respiratory: Negative for cough, chest tightness, shortness of breath and wheezing.    Cardiovascular: Negative for chest pain, palpitations and leg swelling.   Gastrointestinal: Negative for abdominal pain, blood in stool, constipation and diarrhea.        Recent flare diverticulitis   Endocrine: Negative.  Negative for cold intolerance and heat intolerance.   Genitourinary: Negative.  Negative for difficulty urinating, dysuria, hematuria and urgency.   Musculoskeletal: Positive for arthralgias, arthritis, back pain and neck pain. Negative for myalgias.        L Neck and Back pain worse, Neurontin helps.   Skin: Negative.  Negative for color change, pallor and wound.   Allergic/Immunologic: Positive for environmental allergies. Negative for food allergies and immunocompromised state.   Neurological: Positive for headaches. Negative for tremors and speech difficulty.   Hematological: Negative for adenopathy. Does not bruise/bleed easily.   Psychiatric/Behavioral: Negative for agitation, dysphoric mood and sleep disturbance. The patient has insomnia. The patient is not nervous/anxious and is not hyperactive.        Yehuda Rutledge presents to [facility] [place]  Hyperlipidemia  This is a chronic problem. The current episode started more than 1 year ago. The problem is controlled. Recent lipid tests were reviewed and are variable. Pertinent negatives include no chest pain, myalgias or shortness of breath. Current antihyperlipidemic treatment includes statins. The  current treatment provides mild improvement of lipids. Risk factors for coronary artery disease include hypertension and dyslipidemia.   Allergies  This is a chronic problem. The current episode started more than 1 year ago. The problem occurs daily. The problem has been gradually improving. Associated symptoms include arthralgias, headaches and neck pain. Pertinent negatives include no abdominal pain, chest pain, chills, congestion, coughing, fatigue, fever, myalgias or sore throat.   Neck Pain   This is a chronic problem. The current episode started more than 1 year ago. The problem occurs intermittently. The problem has been waxing and waning. The pain is at a severity of 3/10. The pain is mild. Associated symptoms include headaches. Pertinent negatives include no chest pain or fever. He has tried NSAIDs and muscle relaxants for the symptoms. The treatment provided mild relief.   Arthritis  Presents for follow-up visit. Affected location: multiple joints. His pain is at a severity of 5/10. Pertinent negatives include no diarrhea, dysuria, fatigue or fever.   COPD  There is no cough, shortness of breath or wheezing. The current episode started more than 1 year ago. The problem occurs intermittently. Associated symptoms include headaches. Pertinent negatives include no chest pain, ear pain, fever, myalgias, postnasal drip or sore throat. His symptoms are alleviated by beta-agonist. He reports moderate improvement on treatment. His past medical history is significant for COPD.   Insomnia  This is a chronic problem. The current episode started more than 1 year ago. The problem occurs intermittently. Associated symptoms include arthralgias, headaches and neck pain. Pertinent negatives include no abdominal pain, chest pain, chills, congestion, coughing, fatigue, fever, myalgias or sore throat. He has tried rest and sleep (Neurontin) for the symptoms. The treatment provided moderate relief.   Fatigue  This is a chronic  "(improved.) problem. The current episode started more than 1 year ago. The problem occurs daily. The problem has been waxing and waning. Associated symptoms include arthralgias, headaches and neck pain. Pertinent negatives include no abdominal pain, chest pain, chills, congestion, coughing, fatigue, fever, myalgias or sore throat.   Headache   The current episode started more than 1 month ago. The problem has been resolved. The pain is at a severity of 0/10. The patient is experiencing no pain. Associated symptoms include back pain, insomnia and neck pain. Pertinent negatives include no abdominal pain, coughing, ear pain, eye pain, fever, sinus pressure or sore throat. He has tried acetaminophen and NSAIDs for the symptoms. The treatment provided significant relief.   GI Problem  The primary symptoms include arthralgias. Primary symptoms do not include fever, fatigue, abdominal pain, diarrhea, dysuria or myalgias.   The illness is also significant for back pain. The illness does not include chills or constipation. Significant associated medical issues include diverticulitis.       Objective   Vital Signs:   /70 (BP Location: Right arm, Patient Position: Sitting, Cuff Size: Adult)   Pulse 78   Temp 97.5 °F (36.4 °C) (Temporal)   Ht 165.1 cm (65\")   Wt 77 kg (169 lb 12.8 oz)   SpO2 94%   BMI 28.26 kg/m²     Physical Exam  Vitals and nursing note reviewed.   Constitutional:       General: He is not in acute distress.     Appearance: He is obese.   HENT:      Head: Atraumatic.      Right Ear: Tympanic membrane, ear canal and external ear normal. There is no impacted cerumen.      Left Ear: Tympanic membrane, ear canal and external ear normal. There is no impacted cerumen.      Nose: No congestion or rhinorrhea.      Mouth/Throat:      Mouth: Mucous membranes are moist.      Pharynx: No oropharyngeal exudate or posterior oropharyngeal erythema.   Eyes:      Conjunctiva/sclera: Conjunctivae normal.      " Pupils: Pupils are equal, round, and reactive to light.   Neck:      Comments: L neck and shoulder pain.  Cardiovascular:      Rate and Rhythm: Normal rate and regular rhythm.      Heart sounds: No murmur heard.  No gallop.    Pulmonary:      Effort: Pulmonary effort is normal.      Breath sounds: Normal breath sounds. No rhonchi.   Abdominal:      General: Bowel sounds are normal.      Palpations: Abdomen is soft.      Tenderness: There is no abdominal tenderness. There is no right CVA tenderness, left CVA tenderness, guarding or rebound.      Hernia: No hernia is present.   Musculoskeletal:         General: Normal range of motion.      Cervical back: Normal range of motion. Muscular tenderness present.   Skin:     General: Skin is warm and dry.      Capillary Refill: Capillary refill takes 2 to 3 seconds.      Coloration: Skin is not jaundiced.      Findings: No erythema.   Neurological:      General: No focal deficit present.      Mental Status: He is oriented to person, place, and time.      Cranial Nerves: No cranial nerve deficit.   Psychiatric:         Mood and Affect: Mood normal.         Behavior: Behavior normal.         Thought Content: Thought content normal.         Judgment: Judgment normal.        Result Review :                 Assessment and Plan    Diagnoses and all orders for this visit:    1. Chronic cervical radiculopathy    2. Chronic bilateral low back pain with bilateral sciatica    3. Chronic allergic rhinitis  -     cetirizine (zyrTEC) 10 MG tablet; Take 1 tablet by mouth Daily.  Dispense: 90 tablet; Refill: 1  -     fluticasone (FLONASE) 50 MCG/ACT nasal spray; 2 sprays into the nostril(s) as directed by provider Daily. Administer 2 sprays in each nostril for each dose.  Dispense: 18.2 mL; Refill: 5    4. Hyperlipidemia, unspecified hyperlipidemia type  -     pravastatin (PRAVACHOL) 20 MG tablet; Take 1 tablet by mouth Every Night.  Dispense: 90 tablet; Refill: 1        Follow Up   Return  in about 4 months (around 4/3/2022).  Patient was given instructions and counseling regarding his condition or for health maintenance advice. Please see specific information pulled into the AVS if appropriate.         This document has been electronically signed by Buzz Sifuentes MD

## 2022-01-25 ENCOUNTER — TELEPHONE (OUTPATIENT)
Dept: FAMILY MEDICINE CLINIC | Facility: CLINIC | Age: 72
End: 2022-01-25

## 2022-01-25 RX ORDER — LEVOFLOXACIN 750 MG/1
750 TABLET ORAL DAILY
Qty: 7 TABLET | Refills: 0 | Status: SHIPPED | OUTPATIENT
Start: 2022-01-25 | End: 2022-03-17

## 2022-01-25 RX ORDER — METRONIDAZOLE 500 MG/1
500 TABLET ORAL 3 TIMES DAILY
Qty: 21 TABLET | Refills: 0 | Status: SHIPPED | OUTPATIENT
Start: 2022-01-25 | End: 2022-04-04

## 2022-01-25 NOTE — TELEPHONE ENCOUNTER
Patient called asking if Dr. Sifuentes could send in a script for an antibiotic to treat his diverticulitis. He said he wasn't sure if Helene had already spoken to him yet, but he was really hurting so he called to make sure. Stated doctor had told him when they discussed it last that he could just call and let him know he needed medication for it. Let patient know a message was put in and that doctor had a full schedule of patients today so please allow him time to be able to see and respond. Patient acknowledged understanding and was grateful a message was put in.

## 2022-01-25 NOTE — TELEPHONE ENCOUNTER
Pt left VM stating having flare up of diverticulitis. Would like to know if could have something sent to the pharmacy for this.  Please advise.

## 2022-03-17 ENCOUNTER — TELEPHONE (OUTPATIENT)
Dept: FAMILY MEDICINE CLINIC | Facility: CLINIC | Age: 72
End: 2022-03-17

## 2022-03-17 RX ORDER — PREDNISONE 10 MG/1
10 TABLET ORAL SEE ADMIN INSTRUCTIONS
Qty: 17 TABLET | Refills: 0 | Status: SHIPPED | OUTPATIENT
Start: 2022-03-17 | End: 2022-04-04

## 2022-03-17 RX ORDER — AZITHROMYCIN 250 MG/1
TABLET, FILM COATED ORAL
Qty: 6 TABLET | Refills: 0 | Status: SHIPPED | OUTPATIENT
Start: 2022-03-17 | End: 2022-04-04

## 2022-03-17 NOTE — TELEPHONE ENCOUNTER
Pt called with c/o ears feeling stopped up, like in a drum. And has a cold. Would like to know if some medication could be sent in or if he needs an appointment. Pt next follow up scheduled for 4/4/22.  Please advise.

## 2022-04-01 ENCOUNTER — TELEPHONE (OUTPATIENT)
Dept: FAMILY MEDICINE CLINIC | Facility: CLINIC | Age: 72
End: 2022-04-01

## 2022-04-04 ENCOUNTER — OFFICE VISIT (OUTPATIENT)
Dept: FAMILY MEDICINE CLINIC | Facility: CLINIC | Age: 72
End: 2022-04-04

## 2022-04-04 VITALS
OXYGEN SATURATION: 97 % | TEMPERATURE: 97.3 F | BODY MASS INDEX: 29.04 KG/M2 | HEIGHT: 65 IN | HEART RATE: 95 BPM | WEIGHT: 174.3 LBS | SYSTOLIC BLOOD PRESSURE: 128 MMHG | DIASTOLIC BLOOD PRESSURE: 76 MMHG

## 2022-04-04 DIAGNOSIS — G89.29 CHRONIC RIGHT-SIDED LOW BACK PAIN WITH RIGHT-SIDED SCIATICA: ICD-10-CM

## 2022-04-04 DIAGNOSIS — J01.41 ACUTE RECURRENT PANSINUSITIS: ICD-10-CM

## 2022-04-04 DIAGNOSIS — M54.41 CHRONIC BILATERAL LOW BACK PAIN WITH BILATERAL SCIATICA: ICD-10-CM

## 2022-04-04 DIAGNOSIS — J44.9 STAGE 2 MODERATE COPD BY GOLD CLASSIFICATION: ICD-10-CM

## 2022-04-04 DIAGNOSIS — G89.29 CHRONIC BILATERAL LOW BACK PAIN WITH BILATERAL SCIATICA: ICD-10-CM

## 2022-04-04 DIAGNOSIS — M54.42 CHRONIC BILATERAL LOW BACK PAIN WITH BILATERAL SCIATICA: ICD-10-CM

## 2022-04-04 DIAGNOSIS — M54.41 CHRONIC RIGHT-SIDED LOW BACK PAIN WITH RIGHT-SIDED SCIATICA: ICD-10-CM

## 2022-04-04 DIAGNOSIS — M54.12 CHRONIC CERVICAL RADICULOPATHY: ICD-10-CM

## 2022-04-04 DIAGNOSIS — H93.8X3 EAR FULLNESS, BILATERAL: ICD-10-CM

## 2022-04-04 PROCEDURE — 99214 OFFICE O/P EST MOD 30 MIN: CPT | Performed by: FAMILY MEDICINE

## 2022-04-04 RX ORDER — PREDNISONE 10 MG/1
10 TABLET ORAL SEE ADMIN INSTRUCTIONS
Qty: 17 TABLET | Refills: 0 | Status: SHIPPED | OUTPATIENT
Start: 2022-04-04 | End: 2022-05-02

## 2022-04-04 RX ORDER — GABAPENTIN 100 MG/1
100 CAPSULE ORAL 2 TIMES DAILY
Qty: 60 CAPSULE | Refills: 3 | Status: SHIPPED | OUTPATIENT
Start: 2022-04-04 | End: 2022-08-11 | Stop reason: SDUPTHER

## 2022-04-04 RX ORDER — DOXYCYCLINE HYCLATE 100 MG/1
100 CAPSULE ORAL 2 TIMES DAILY
Qty: 20 CAPSULE | Refills: 0 | Status: SHIPPED | OUTPATIENT
Start: 2022-04-04 | End: 2022-04-11

## 2022-04-04 NOTE — PROGRESS NOTES
Chief Complaint  Neck Pain, COPD, Hyperlipidemia, Insomnia, and Ear Problem  ' Sinus infection with ear fullness flaring up'.   Subjective          Review of Systems   Constitutional: Negative for chills, fatigue and fever.   HENT: Positive for ear pain, postnasal drip, sinus pressure and sinus pain. Negative for congestion, facial swelling, mouth sores and sore throat.    Eyes: Negative for pain, discharge, itching and visual disturbance.   Respiratory: Positive for sputum production and wheezing. Negative for cough, chest tightness and shortness of breath.    Cardiovascular: Negative for chest pain, palpitations and leg swelling.   Gastrointestinal: Negative for abdominal pain, blood in stool, constipation and diarrhea.        Recent flare diverticulitis improved.   Endocrine: Negative.  Negative for cold intolerance and heat intolerance.   Genitourinary: Negative.  Negative for difficulty urinating, dysuria, hematuria and urgency.   Musculoskeletal: Positive for arthralgias, back pain and neck pain. Negative for myalgias.        L Neck and Back pain stable, Neurontin helps.   Skin: Negative.  Negative for color change, pallor and wound. Rash: ear.   Allergic/Immunologic: Positive for environmental allergies. Negative for food allergies and immunocompromised state.   Neurological: Positive for headaches. Negative for tremors and speech difficulty.   Hematological: Negative for adenopathy. Does not bruise/bleed easily.   Psychiatric/Behavioral: Negative for agitation, dysphoric mood and sleep disturbance. The patient has insomnia. The patient is not nervous/anxious and is not hyperactive.        Yehuda Rutledge presents to [facility] - [place]  Neck Pain   This is a chronic problem. The current episode started more than 1 year ago. The problem occurs daily. The problem has been waxing and waning. The pain is at a severity of 4/10. The pain is moderate. Associated  symptoms include headaches. Pertinent negatives include no chest pain or fever. He has tried acetaminophen, NSAIDs, home exercises, chiropractic manipulation and muscle relaxants (Neurontin) for the symptoms. The treatment provided mild relief.   COPD  He complains of sputum production and wheezing. There is no cough or shortness of breath. Associated symptoms include ear pain, headaches and postnasal drip. Pertinent negatives include no chest pain, fever, myalgias or sore throat. His past medical history is significant for COPD.   Hyperlipidemia  This is a chronic problem. The current episode started more than 1 year ago. Recent lipid tests were reviewed and are variable. Pertinent negatives include no chest pain, myalgias or shortness of breath. The current treatment provides moderate improvement of lipids. Risk factors for coronary artery disease include hypertension, male sex, obesity and dyslipidemia.   Insomnia  This is a chronic problem. Associated symptoms include arthralgias, headaches and neck pain. Pertinent negatives include no abdominal pain, chest pain, chills, congestion, coughing, fatigue, fever, myalgias or sore throat. Rash: ear. The symptoms are aggravated by stress (Pain). He has tried sleep and rest (Elavil. Trazadone, OTC meds) for the symptoms. The treatment provided moderate relief.   Sinusitis  This is a recurrent problem. The current episode started 1 to 4 weeks ago. The problem has been waxing and waning since onset. There has been no fever. His pain is at a severity of 4/10. The pain is moderate. Associated symptoms include ear pain, headaches, neck pain and sinus pressure. Pertinent negatives include no chills, congestion, coughing, shortness of breath or sore throat. Past treatments include acetaminophen, oral decongestants, saline sprays and antibiotics. The treatment provided mild relief.   Ear Fullness   There is pain in both ears. This is a new problem. The current episode started 1  "to 4 weeks ago. The problem has been waxing and waning. Associated symptoms include headaches and neck pain. Pertinent negatives include no abdominal pain, coughing, diarrhea or sore throat. Rash: ear. He has tried acetaminophen and antibiotics for the symptoms. The treatment provided mild relief.       Objective   Vital Signs:   /76 (BP Location: Left arm, Patient Position: Sitting, Cuff Size: Adult)   Pulse 95   Temp 97.3 °F (36.3 °C) (Temporal)   Ht 165.1 cm (65\")   Wt 79.1 kg (174 lb 4.8 oz)   SpO2 97%   BMI 29.01 kg/m²     Physical Exam  Vitals and nursing note reviewed.   Constitutional:       General: He is not in acute distress.     Appearance: He is obese.   HENT:      Head: Atraumatic.      Right Ear: Tympanic membrane, ear canal and external ear normal. There is no impacted cerumen.      Left Ear: Tympanic membrane, ear canal and external ear normal. There is no impacted cerumen.      Nose: Congestion and rhinorrhea present.      Comments: Tan Post nasal drainage.      Mouth/Throat:      Mouth: Mucous membranes are moist.      Pharynx: No oropharyngeal exudate or posterior oropharyngeal erythema.   Eyes:      General:         Right eye: No discharge.         Left eye: No discharge.      Conjunctiva/sclera: Conjunctivae normal.      Pupils: Pupils are equal, round, and reactive to light.   Neck:      Comments: L neck and shoulder pain.  Cardiovascular:      Rate and Rhythm: Normal rate and regular rhythm.      Heart sounds: No murmur heard.    No gallop.   Pulmonary:      Effort: Pulmonary effort is normal.      Breath sounds: Wheezing and rhonchi present.   Abdominal:      General: Bowel sounds are normal.      Palpations: Abdomen is soft.      Tenderness: There is no abdominal tenderness. There is no right CVA tenderness, left CVA tenderness, guarding or rebound.      Hernia: No hernia is present.   Musculoskeletal:         General: Normal range of motion.      Cervical back: Normal range of " motion. Muscular tenderness present.   Skin:     General: Skin is warm and dry.      Capillary Refill: Capillary refill takes 2 to 3 seconds.      Coloration: Skin is not jaundiced.      Findings: No bruising, erythema or lesion.   Neurological:      General: No focal deficit present.      Mental Status: He is oriented to person, place, and time.      Cranial Nerves: No cranial nerve deficit.      Sensory: No sensory deficit.      Motor: Weakness present.      Coordination: Coordination normal.      Gait: Gait abnormal.      Deep Tendon Reflexes: Reflexes normal.   Psychiatric:         Mood and Affect: Mood normal.         Behavior: Behavior normal.         Thought Content: Thought content normal.         Judgment: Judgment normal.        Result Review :                 Assessment and Plan    Diagnoses and all orders for this visit:    1. Chronic cervical radiculopathy  -     gabapentin (NEURONTIN) 100 MG capsule; Take 1 capsule by mouth 2 (Two) Times a Day.  Dispense: 60 capsule; Refill: 3    2. Chronic bilateral low back pain with bilateral sciatica  -     gabapentin (NEURONTIN) 100 MG capsule; Take 1 capsule by mouth 2 (Two) Times a Day.  Dispense: 60 capsule; Refill: 3    3. Stage 2 moderate COPD by GOLD classification (HCC)    4. Chronic right-sided low back pain with right-sided sciatica    5. Acute recurrent pansinusitis  Comments:  Samples Sinus rinse given.  Orders:  -     predniSONE (DELTASONE) 10 MG tablet; Take 1 tablet by mouth See Admin Instructions. Take 1 Tab Tid x3 days, Then 1 Tab Bid x 2 days Then 1 Tab QD x3 days,then D/C  Dispense: 17 tablet; Refill: 0  -     doxycycline (VIBRAMYCIN) 100 MG capsule; Take 1 capsule by mouth 2 (Two) Times a Day.  Dispense: 20 capsule; Refill: 0    6. Ear fullness, bilateral  -     predniSONE (DELTASONE) 10 MG tablet; Take 1 tablet by mouth See Admin Instructions. Take 1 Tab Tid x3 days, Then 1 Tab Bid x 2 days Then 1 Tab QD x3 days,then D/C  Dispense: 17 tablet;  Refill: 0  -     doxycycline (VIBRAMYCIN) 100 MG capsule; Take 1 capsule by mouth 2 (Two) Times a Day.  Dispense: 20 capsule; Refill: 0    Discussed exam, health problems, meds, indications, tx plan, rationale. RONALDO reviewed discussed safety with controlled med. Discussed F/U plan.     Follow Up   Return in about 4 weeks (around 5/2/2022).  Patient was given instructions and counseling regarding his condition or for health maintenance advice. Please see specific information pulled into the AVS if appropriate.         This document has been electronically signed by Buzz Sifuentes MD on April 4, 2022 09:54 CDT

## 2022-04-11 ENCOUNTER — TELEPHONE (OUTPATIENT)
Dept: FAMILY MEDICINE CLINIC | Facility: CLINIC | Age: 72
End: 2022-04-11

## 2022-04-11 RX ORDER — SUCRALFATE 1 G/1
1 TABLET ORAL 4 TIMES DAILY
Qty: 30 TABLET | Refills: 0 | Status: SHIPPED | OUTPATIENT
Start: 2022-04-11 | End: 2022-05-02

## 2022-04-11 NOTE — TELEPHONE ENCOUNTER
Pt called with c/o of recent antibiotic he was prescribed causing his stomach to burn.  Feels like has a diverticulitis flare. Would like to know if could have something sent in for this.   Please advise.

## 2022-04-11 NOTE — TELEPHONE ENCOUNTER
Spoke with pt to inform to stop current antibiotic treatment. New RX was sent for pt for stomach complaint. Pt will contact office with update 4/12/22.

## 2022-04-12 ENCOUNTER — OFFICE VISIT (OUTPATIENT)
Dept: FAMILY MEDICINE CLINIC | Facility: CLINIC | Age: 72
End: 2022-04-12

## 2022-04-12 VITALS
OXYGEN SATURATION: 96 % | WEIGHT: 170 LBS | BODY MASS INDEX: 28.32 KG/M2 | HEIGHT: 65 IN | TEMPERATURE: 97.1 F | SYSTOLIC BLOOD PRESSURE: 132 MMHG | HEART RATE: 103 BPM | DIASTOLIC BLOOD PRESSURE: 86 MMHG

## 2022-04-12 DIAGNOSIS — K59.03 DRUG-INDUCED CONSTIPATION: ICD-10-CM

## 2022-04-12 PROCEDURE — 99213 OFFICE O/P EST LOW 20 MIN: CPT | Performed by: FAMILY MEDICINE

## 2022-04-12 RX ORDER — POLYETHYLENE GLYCOL 3350 17 G/17G
17 POWDER, FOR SOLUTION ORAL 2 TIMES DAILY PRN
Qty: 20 PACKET | Refills: 1 | COMMUNITY
Start: 2022-04-12 | End: 2022-05-02 | Stop reason: SDUPTHER

## 2022-04-12 NOTE — PROGRESS NOTES
Chief Complaint  Abdominal Pain and Constipation  ' Carafate helped burning in stomach, have not had BM in 3-4 days. Appetite OK, drinking fluids , but abd hurts and cramps at times'    Subjective          Review of Systems   Constitutional: Negative for chills, fatigue and fever.   HENT: Negative for congestion, ear pain, facial swelling, mouth sores, postnasal drip, sinus pressure, sinus pain and sore throat.    Eyes: Negative for pain, discharge, itching and visual disturbance.   Respiratory: Negative for cough, chest tightness, shortness of breath and wheezing.    Cardiovascular: Negative for chest pain, palpitations and leg swelling.   Gastrointestinal: Positive for constipation. Negative for abdominal pain, blood in stool and diarrhea.        Recent flare diverticulitis improved.   Endocrine: Negative.  Negative for cold intolerance and heat intolerance.   Genitourinary: Negative.  Negative for difficulty urinating, dysuria, hematuria and urgency.   Musculoskeletal: Positive for arthralgias, back pain and neck pain. Negative for myalgias.        L Neck and Back pain stable, Neurontin helps.   Skin: Negative.  Negative for color change, pallor and wound. Rash: ear.   Allergic/Immunologic: Positive for environmental allergies. Negative for food allergies and immunocompromised state.   Neurological: Negative for tremors, speech difficulty and headaches.   Hematological: Negative for adenopathy. Does not bruise/bleed easily.   Psychiatric/Behavioral: Negative for agitation, dysphoric mood and sleep disturbance. The patient is not nervous/anxious and is not hyperactive.        Yehuda Rutledge presents to [facility] - [facility]  This is a new problem. The current episode started in the past 7 days. The problem has been gradually worsening since onset. The patient is on a high fiber diet. He exercises regularly. There has been adequate water intake.  "Associated symptoms include back pain. Pertinent negatives include no abdominal pain, diarrhea, difficulty urinating or fever. He has tried laxatives for the symptoms. The treatment provided no relief.       Objective   Vital Signs:   /86 (BP Location: Right arm, Patient Position: Sitting, Cuff Size: Adult)   Pulse 103   Temp 97.1 °F (36.2 °C) (Temporal)   Ht 165.1 cm (65\")   Wt 77.1 kg (170 lb)   SpO2 96%   BMI 28.29 kg/m²     Physical Exam  Vitals and nursing note reviewed.   Constitutional:       General: He is not in acute distress.     Appearance: He is obese.   HENT:      Head: Atraumatic.      Right Ear: Tympanic membrane, ear canal and external ear normal. There is no impacted cerumen.      Left Ear: Tympanic membrane, ear canal and external ear normal. There is no impacted cerumen.      Nose: No congestion or rhinorrhea.      Comments: Tan Post nasal drainage.      Mouth/Throat:      Mouth: Mucous membranes are moist.      Pharynx: No oropharyngeal exudate or posterior oropharyngeal erythema.   Eyes:      General:         Right eye: No discharge.         Left eye: No discharge.      Conjunctiva/sclera: Conjunctivae normal.      Pupils: Pupils are equal, round, and reactive to light.   Neck:      Comments: L neck and shoulder pain.  Cardiovascular:      Rate and Rhythm: Normal rate and regular rhythm.      Heart sounds: No murmur heard.    No gallop.   Pulmonary:      Effort: Pulmonary effort is normal.      Breath sounds: No wheezing or rhonchi.   Abdominal:      General: Bowel sounds are normal.      Palpations: Abdomen is soft.      Tenderness: There is abdominal tenderness. There is no right CVA tenderness, left CVA tenderness, guarding or rebound.      Hernia: No hernia is present.   Musculoskeletal:         General: Normal range of motion.      Cervical back: Normal range of motion. Muscular tenderness present.   Skin:     General: Skin is warm and dry.      Capillary Refill: Capillary " refill takes 2 to 3 seconds.      Coloration: Skin is not jaundiced.      Findings: No bruising, erythema or lesion.   Neurological:      General: No focal deficit present.      Mental Status: He is oriented to person, place, and time.      Cranial Nerves: No cranial nerve deficit.      Sensory: No sensory deficit.      Motor: No weakness.      Coordination: Coordination normal.      Gait: Gait normal.      Deep Tendon Reflexes: Reflexes normal.   Psychiatric:         Mood and Affect: Mood normal.         Behavior: Behavior normal.         Thought Content: Thought content normal.         Judgment: Judgment normal.        Result Review :                 Assessment and Plan    Diagnoses and all orders for this visit:    1. Drug-induced constipation  -     XR Abdomen KUB (In Office)  -     polyethylene glycol (MIRALAX) 17 g packet; Take 17 g by mouth 2 (Two) Times a Day As Needed (Constipation).  Dispense: 20 packet; Refill: 1    Discussed exam,, health problems, sample Linzess 72 mcg, given, samples Miralax given, will chek KUB. Discussed F/u plan.     Follow Up   Return if symptoms worsen or fail to improve.  Patient was given instructions and counseling regarding his condition or for health maintenance advice. Please see specific information pulled into the AVS if appropriate.         This document has been electronically signed by Buzz Sifuentes MD on April 12, 2022 12:56 CDT

## 2022-04-14 ENCOUNTER — TELEPHONE (OUTPATIENT)
Dept: FAMILY MEDICINE CLINIC | Facility: CLINIC | Age: 72
End: 2022-04-14

## 2022-04-14 NOTE — TELEPHONE ENCOUNTER
Spoke with pt to give results. Voiced understanding. States does feel some better, but does still have pain.  Will keep office updated.

## 2022-04-14 NOTE — TELEPHONE ENCOUNTER
----- Message from Buzz Sifuentes MD sent at 4/13/2022 12:26 PM CDT -----  Moderate constipation, nom acute problems seen on x-ray. Keep us updated.    Fair Good Good

## 2022-05-02 ENCOUNTER — LAB (OUTPATIENT)
Dept: LAB | Facility: HOSPITAL | Age: 72
End: 2022-05-02

## 2022-05-02 ENCOUNTER — OFFICE VISIT (OUTPATIENT)
Dept: FAMILY MEDICINE CLINIC | Facility: CLINIC | Age: 72
End: 2022-05-02

## 2022-05-02 VITALS
OXYGEN SATURATION: 95 % | WEIGHT: 168.5 LBS | BODY MASS INDEX: 28.07 KG/M2 | HEART RATE: 87 BPM | DIASTOLIC BLOOD PRESSURE: 72 MMHG | HEIGHT: 65 IN | TEMPERATURE: 97.3 F | SYSTOLIC BLOOD PRESSURE: 140 MMHG

## 2022-05-02 DIAGNOSIS — K59.04 CHRONIC IDIOPATHIC CONSTIPATION: ICD-10-CM

## 2022-05-02 DIAGNOSIS — R10.33 PERIUMBILICAL ABDOMINAL PAIN: ICD-10-CM

## 2022-05-02 DIAGNOSIS — Z12.5 SCREENING FOR PROSTATE CANCER: ICD-10-CM

## 2022-05-02 DIAGNOSIS — K57.90 DIVERTICULAR DISEASE: ICD-10-CM

## 2022-05-02 LAB
ALBUMIN SERPL-MCNC: 4.5 G/DL (ref 3.5–5.2)
ALBUMIN/GLOB SERPL: 1.2 G/DL
ALP SERPL-CCNC: 108 U/L (ref 39–117)
ALT SERPL W P-5'-P-CCNC: 13 U/L (ref 1–41)
AMPHET+METHAMPHET UR QL: NEGATIVE
AMPHETAMINES UR QL: NEGATIVE
ANION GAP SERPL CALCULATED.3IONS-SCNC: 9.7 MMOL/L (ref 5–15)
AST SERPL-CCNC: 19 U/L (ref 1–40)
BARBITURATES UR QL SCN: NEGATIVE
BASOPHILS # BLD AUTO: 0.06 10*3/MM3 (ref 0–0.2)
BASOPHILS NFR BLD AUTO: 0.5 % (ref 0–1.5)
BENZODIAZ UR QL SCN: NEGATIVE
BILIRUB SERPL-MCNC: 0.3 MG/DL (ref 0–1.2)
BUN SERPL-MCNC: 9 MG/DL (ref 8–23)
BUN/CREAT SERPL: 10.1 (ref 7–25)
BUPRENORPHINE SERPL-MCNC: NEGATIVE NG/ML
CALCIUM SPEC-SCNC: 10.1 MG/DL (ref 8.6–10.5)
CANNABINOIDS SERPL QL: NEGATIVE
CHLORIDE SERPL-SCNC: 101 MMOL/L (ref 98–107)
CO2 SERPL-SCNC: 27.3 MMOL/L (ref 22–29)
COCAINE UR QL: NEGATIVE
CREAT SERPL-MCNC: 0.89 MG/DL (ref 0.76–1.27)
CRP SERPL-MCNC: 11.75 MG/DL (ref 0–0.5)
DEPRECATED RDW RBC AUTO: 42.5 FL (ref 37–54)
EGFRCR SERPLBLD CKD-EPI 2021: 92.8 ML/MIN/1.73
EOSINOPHIL # BLD AUTO: 0.15 10*3/MM3 (ref 0–0.4)
EOSINOPHIL NFR BLD AUTO: 1.3 % (ref 0.3–6.2)
ERYTHROCYTE [DISTWIDTH] IN BLOOD BY AUTOMATED COUNT: 14.9 % (ref 12.3–15.4)
GLOBULIN UR ELPH-MCNC: 3.8 GM/DL
GLUCOSE SERPL-MCNC: 112 MG/DL (ref 65–99)
HCT VFR BLD AUTO: 38.3 % (ref 37.5–51)
HGB BLD-MCNC: 12.7 G/DL (ref 13–17.7)
IMM GRANULOCYTES # BLD AUTO: 0.03 10*3/MM3 (ref 0–0.05)
IMM GRANULOCYTES NFR BLD AUTO: 0.3 % (ref 0–0.5)
LYMPHOCYTES # BLD AUTO: 3.65 10*3/MM3 (ref 0.7–3.1)
LYMPHOCYTES NFR BLD AUTO: 32.3 % (ref 19.6–45.3)
MCH RBC QN AUTO: 26.5 PG (ref 26.6–33)
MCHC RBC AUTO-ENTMCNC: 33.2 G/DL (ref 31.5–35.7)
MCV RBC AUTO: 79.8 FL (ref 79–97)
METHADONE UR QL SCN: NEGATIVE
MONOCYTES # BLD AUTO: 0.75 10*3/MM3 (ref 0.1–0.9)
MONOCYTES NFR BLD AUTO: 6.6 % (ref 5–12)
NEUTROPHILS NFR BLD AUTO: 59 % (ref 42.7–76)
NEUTROPHILS NFR BLD AUTO: 6.66 10*3/MM3 (ref 1.7–7)
NRBC BLD AUTO-RTO: 0 /100 WBC (ref 0–0.2)
OPIATES UR QL: NEGATIVE
OXYCODONE UR QL SCN: NEGATIVE
PCP UR QL SCN: NEGATIVE
PLATELET # BLD AUTO: 393 10*3/MM3 (ref 140–450)
PMV BLD AUTO: 10.5 FL (ref 6–12)
POTASSIUM SERPL-SCNC: 5.6 MMOL/L (ref 3.5–5.2)
PROPOXYPH UR QL: NEGATIVE
PROT SERPL-MCNC: 8.3 G/DL (ref 6–8.5)
PSA SERPL-MCNC: 5.35 NG/ML (ref 0–4)
RBC # BLD AUTO: 4.8 10*6/MM3 (ref 4.14–5.8)
SODIUM SERPL-SCNC: 138 MMOL/L (ref 136–145)
TRICYCLICS UR QL SCN: NEGATIVE
WBC NRBC COR # BLD: 11.3 10*3/MM3 (ref 3.4–10.8)

## 2022-05-02 PROCEDURE — 99214 OFFICE O/P EST MOD 30 MIN: CPT | Performed by: FAMILY MEDICINE

## 2022-05-02 PROCEDURE — 86140 C-REACTIVE PROTEIN: CPT

## 2022-05-02 PROCEDURE — 80306 DRUG TEST PRSMV INSTRMNT: CPT | Performed by: FAMILY MEDICINE

## 2022-05-02 PROCEDURE — 80053 COMPREHEN METABOLIC PANEL: CPT

## 2022-05-02 PROCEDURE — 85025 COMPLETE CBC W/AUTO DIFF WBC: CPT

## 2022-05-02 PROCEDURE — G0103 PSA SCREENING: HCPCS

## 2022-05-02 RX ORDER — SUCRALFATE 1 G/1
1 TABLET ORAL 4 TIMES DAILY
Refills: 0 | Status: CANCELLED | OUTPATIENT
Start: 2022-05-02

## 2022-05-02 RX ORDER — TRAMADOL HYDROCHLORIDE 50 MG/1
50 TABLET ORAL EVERY 6 HOURS PRN
Qty: 24 TABLET | Refills: 1 | Status: SHIPPED | OUTPATIENT
Start: 2022-05-02 | End: 2022-08-31

## 2022-05-02 RX ORDER — POLYETHYLENE GLYCOL 3350 17 G/17G
17 POWDER, FOR SOLUTION ORAL 2 TIMES DAILY PRN
Qty: 20 PACKET | Refills: 1 | COMMUNITY
Start: 2022-05-02

## 2022-05-02 RX ORDER — SULFAMETHOXAZOLE AND TRIMETHOPRIM 800; 160 MG/1; MG/1
1 TABLET ORAL 2 TIMES DAILY
Qty: 10 TABLET | Refills: 0 | Status: SHIPPED | OUTPATIENT
Start: 2022-05-02 | End: 2022-05-11

## 2022-05-02 NOTE — PROGRESS NOTES
Chief Complaint  Constipation and Abdominal Pain (Diverticular disease)  'Still having recurrent abd pain, comes and goes, began after colonoscopy. Recent Antibiotics has caused some improvement, but  around navel'.  Subjective          Review of Systems   Constitutional: Negative for chills, fatigue and fever.   HENT: Negative for congestion, ear pain, facial swelling, mouth sores, postnasal drip, sinus pressure, sinus pain and sore throat.    Eyes: Negative for pain, discharge, itching and visual disturbance.   Respiratory: Negative for cough, chest tightness, shortness of breath and wheezing.    Cardiovascular: Negative for chest pain, palpitations and leg swelling.   Gastrointestinal: Positive for constipation. Negative for abdominal pain, blood in stool and diarrhea.        Recent flare diverticulitis improved some with Antibiotics   Endocrine: Negative.  Negative for cold intolerance and heat intolerance.   Genitourinary: Negative.  Negative for difficulty urinating, dysuria, hematuria and urgency.   Musculoskeletal: Positive for arthralgias, back pain and neck pain. Negative for myalgias.        L Neck and Back pain stable, Neurontin helps.   Skin: Negative.  Negative for color change, pallor and wound. Rash: ear.   Allergic/Immunologic: Positive for environmental allergies. Negative for food allergies and immunocompromised state.   Neurological: Negative for tremors, speech difficulty and headaches.   Hematological: Negative for adenopathy. Does not bruise/bleed easily.   Psychiatric/Behavioral: Negative for agitation, dysphoric mood and sleep disturbance. The patient is not nervous/anxious and is not hyperactive.        Yehuda Rutledge presents to [facility] - [place]  Abdominal Pain  This is a recurrent problem. The current episode started more than 1 month ago. The problem occurs intermittently. The problem has been waxing and waning. The pain is  "located in the LUQ and periumbilical region. The pain is at a severity of 6/10. The pain is moderate. The quality of the pain is cramping. The abdominal pain radiates to the periumbilical region. Associated symptoms include arthralgias and constipation. Pertinent negatives include no diarrhea, dysuria, fever, headaches, hematuria or myalgias. The pain is aggravated by eating. Relieved by: Antibiotic , Laxative. He has tried H2 blockers, proton pump inhibitors, antibiotics and antacids for the symptoms. The treatment provided mild relief. Prior diagnostic workup includes GI consult, lower endoscopy and CT scan. His past medical history is significant for abdominal surgery. Diverticulitis       Objective   Vital Signs:   /72 (BP Location: Right arm, Patient Position: Sitting, Cuff Size: Adult)   Pulse 87   Temp 97.3 °F (36.3 °C) (Temporal)   Ht 165.1 cm (65\")   Wt 76.4 kg (168 lb 8 oz)   SpO2 95%   BMI 28.04 kg/m²     Physical Exam   Result Review :                 Assessment and Plan    Diagnoses and all orders for this visit:    1. Periumbilical abdominal pain  -     linaclotide (Linzess) 145 MCG capsule capsule; Take 1 capsule by mouth Every Morning Before Breakfast.  Dispense: 30 capsule; Refill: 1  -     sulfamethoxazole-trimethoprim (BACTRIM DS,SEPTRA DS) 800-160 MG per tablet; Take 1 tablet by mouth 2 (Two) Times a Day.  Dispense: 10 tablet; Refill: 0  -     CBC & Differential; Future  -     Comprehensive metabolic panel; Future  -     C-reactive protein; Future  -     PSA SCREENING; Future  -     traMADol (ULTRAM) 50 MG tablet; Take 1 tablet by mouth Every 6 (Six) Hours As Needed for Moderate Pain .  Dispense: 24 tablet; Refill: 1  -     CT Abdomen Pelvis Without Contrast; Future    2. Screening for prostate cancer  -     PSA SCREENING; Future    3. Chronic idiopathic constipation  -     linaclotide (Linzess) 145 MCG capsule capsule; Take 1 capsule by mouth Every Morning Before Breakfast.  " Dispense: 30 capsule; Refill: 1  -     polyethylene glycol (MIRALAX) 17 g packet; Take 17 g by mouth 2 (Two) Times a Day As Needed (Constipation).  Dispense: 20 packet; Refill: 1    4. Diverticular disease  -     sulfamethoxazole-trimethoprim (BACTRIM DS,SEPTRA DS) 800-160 MG per tablet; Take 1 tablet by mouth 2 (Two) Times a Day.  Dispense: 10 tablet; Refill: 0  -     CT Abdomen Pelvis Without Contrast; Future    Discussed exam, health problems, stop carafate, C/W Linzess, and Miralax until formed stool past, Trial of Bactrim, recheck labs with a CRP,l recheck CT of Abd,     Follow Up   Return in about 10 days (around 5/12/2022).  Patient was given instructions and counseling regarding his condition or for health maintenance advice. Please see specific information pulled into the AVS if appropriate.         This document has been electronically signed by Buzz Sifuentes MD on May 2, 2022 13:08 CDT

## 2022-05-03 ENCOUNTER — TELEPHONE (OUTPATIENT)
Dept: FAMILY MEDICINE CLINIC | Facility: CLINIC | Age: 72
End: 2022-05-03

## 2022-05-03 NOTE — TELEPHONE ENCOUNTER
Spoke with pt to give lab results.Also verified pt was aware of CT appt date/time and instructions.

## 2022-05-03 NOTE — TELEPHONE ENCOUNTER
----- Message from Buzz Sifuentes MD sent at 5/3/2022  7:57 AM CDT -----  Inflammatory marker is up, supporting an infection, will see what CT of abd shows.

## 2022-05-06 DIAGNOSIS — R10.33 PERIUMBILICAL ABDOMINAL PAIN: ICD-10-CM

## 2022-05-06 DIAGNOSIS — K57.90 DIVERTICULAR DISEASE: ICD-10-CM

## 2022-05-11 ENCOUNTER — OFFICE VISIT (OUTPATIENT)
Dept: FAMILY MEDICINE CLINIC | Facility: CLINIC | Age: 72
End: 2022-05-11

## 2022-05-11 VITALS
BODY MASS INDEX: 28.09 KG/M2 | HEART RATE: 70 BPM | WEIGHT: 168.6 LBS | DIASTOLIC BLOOD PRESSURE: 80 MMHG | TEMPERATURE: 97.5 F | HEIGHT: 65 IN | OXYGEN SATURATION: 98 % | SYSTOLIC BLOOD PRESSURE: 136 MMHG

## 2022-05-11 DIAGNOSIS — R10.33 PERIUMBILICAL ABDOMINAL PAIN: Primary | ICD-10-CM

## 2022-05-11 DIAGNOSIS — R93.5 ABNORMAL CT OF THE ABDOMEN: ICD-10-CM

## 2022-05-11 PROCEDURE — 99214 OFFICE O/P EST MOD 30 MIN: CPT | Performed by: FAMILY MEDICINE

## 2022-05-11 RX ORDER — CIPROFLOXACIN 500 MG/1
TABLET, FILM COATED ORAL
COMMUNITY
Start: 2022-05-07 | End: 2022-05-18

## 2022-05-11 RX ORDER — METRONIDAZOLE 500 MG/1
TABLET ORAL
COMMUNITY
Start: 2022-05-07 | End: 2022-05-18

## 2022-05-11 NOTE — PROGRESS NOTES
Chief Complaint  Hospital Follow Up Visit  ' Having recurrent Abd pain since last November after Colonoscopy. 11- Presented to [facility] had CT of Abd , showed possible diverticulitis. Had antibiotics several times, symptoms get a little better and then return. S/P Tx with abx without resolution of symptoms was sent 5- for repeat CT of abd, showed possible contained perforation. Was instructed to go to ER, went to [facility], saw Dr. Pitts, had Ultrasound, was admitted and placed on IV antibiotics, said should see Surgeon who placed mesh in Abd'.    Subjective          Review of Systems   Constitutional: Negative for chills, fatigue and fever.   HENT: Negative for congestion, ear pain, facial swelling, mouth sores, postnasal drip, sinus pressure, sinus pain and sore throat.    Eyes: Negative for pain, discharge, itching and visual disturbance.   Respiratory: Negative for cough, chest tightness, shortness of breath and wheezing.    Cardiovascular: Negative for chest pain, palpitations and leg swelling.   Gastrointestinal: Positive for constipation. Negative for abdominal pain, blood in stool and diarrhea.        Recent flare diverticulitis(Possible contained perforation) improved  With Hosp admit IV Antibiotics, completing P.O regimen.    Endocrine: Negative.  Negative for cold intolerance and heat intolerance.   Genitourinary: Negative.  Negative for difficulty urinating, dysuria, hematuria and urgency.   Musculoskeletal: Positive for arthralgias, back pain and neck pain. Negative for myalgias.        L Neck and Back pain stable, Neurontin helps.   Skin: Negative.  Negative for color change, pallor and wound. Rash: ear.   Allergic/Immunologic: Positive for environmental allergies. Negative for food allergies and immunocompromised state.   Neurological: Negative for tremors, speech difficulty and headaches.   Hematological: Negative for adenopathy. Does not bruise/bleed easily.   Psychiatric/Behavioral: Negative  "for agitation, dysphoric mood and sleep disturbance. The patient is not nervous/anxious and is not hyperactive.        Yehuda Rutledge presents to [facility] [place]  Abdominal Pain  This is a recurrent problem. The current episode started more than 1 month ago. The problem occurs intermittently. The problem has been waxing and waning. The pain is located in the LUQ and periumbilical region. The pain is at a severity of 3/10. The pain is moderate. The quality of the pain is cramping. The abdominal pain radiates to the periumbilical region. Associated symptoms include arthralgias and constipation. Pertinent negatives include no diarrhea, dysuria, fever, headaches, hematuria or myalgias. The pain is aggravated by eating. Relieved by: Antibiotic. He has tried H2 blockers, proton pump inhibitors, antibiotics and antacids (Tx of constipation) for the symptoms. The treatment provided mild relief. Prior diagnostic workup includes GI consult, lower endoscopy and CT scan (Saw Dr. Pitts at [facility]). His past medical history is significant for abdominal surgery. Diverticulitis       Objective   Vital Signs:   /80 (BP Location: Right arm, Patient Position: Sitting, Cuff Size: Adult)   Pulse 70   Temp 97.5 °F (36.4 °C) (Temporal)   Ht 165.1 cm (65\")   Wt 76.5 kg (168 lb 9.6 oz)   SpO2 98%   BMI 28.06 kg/m²     Physical Exam  Vitals and nursing note reviewed.   Constitutional:       General: He is not in acute distress.     Appearance: He is obese.   HENT:      Head: Atraumatic.      Right Ear: Tympanic membrane, ear canal and external ear normal. There is no impacted cerumen.      Left Ear: Tympanic membrane, ear canal and external ear normal. There is no impacted cerumen.      Nose: No congestion or rhinorrhea.      Comments: [place] nasal drainage.      Mouth/Throat:      Mouth: Mucous membranes are moist.      Pharynx: No oropharyngeal exudate or posterior " oropharyngeal erythema.   Eyes:      General:         Right eye: No discharge.         Left eye: No discharge.      Conjunctiva/sclera: Conjunctivae normal.      Pupils: Pupils are equal, round, and reactive to light.   Neck:      Comments: L neck and shoulder pain.  Cardiovascular:      Rate and Rhythm: Normal rate and regular rhythm.      Heart sounds: No murmur heard.    No gallop.   Pulmonary:      Effort: Pulmonary effort is normal.      Breath sounds: No wheezing or rhonchi.   Abdominal:      General: Bowel sounds are normal.      Palpations: Abdomen is soft.      Tenderness: There is abdominal tenderness. There is no right CVA tenderness, left CVA tenderness, guarding or rebound.      Hernia: A hernia is present.      Comments: Abd tenderness improved from last visit.     Possible small hernia at surgical sites.    Musculoskeletal:      Cervical back: Normal range of motion. Muscular tenderness present.      Right lower leg: No edema.      Left lower leg: No edema.   Skin:     General: Skin is warm and dry.      Capillary Refill: Capillary refill takes 2 to 3 seconds.      Coloration: Skin is not jaundiced.      Findings: No bruising, erythema or lesion.   Neurological:      General: No focal deficit present.      Mental Status: He is oriented to person, place, and time.      Cranial Nerves: No cranial nerve deficit.      Sensory: No sensory deficit.      Motor: No weakness.      Coordination: Coordination normal.      Gait: Gait normal.      Deep Tendon Reflexes: Reflexes normal.   Psychiatric:         Mood and Affect: Mood normal.         Behavior: Behavior normal.         Thought Content: Thought content normal.         Judgment: Judgment normal.        Result Review :                reviewed , Labs , test , from [facility] with Patient.   Assessment and Plan    Diagnoses and all orders for this visit:    1. Periumbilical abdominal pain (Primary)  -     Cancel: Ambulatory Referral to General Surgery  -      Ambulatory Referral to General Surgery    2. Abnormal CT of the abdomen  -     Cancel: Ambulatory Referral to General Surgery  -     Ambulatory Referral to General Surgery    Discussed exam, recurrent Abd pain, referral to Dr. Jarquin for evaluation. Discussed F/U plan.     Follow Up   Return in about 3 months (around 8/11/2022), or if symptoms worsen or fail to improve.  Patient was given instructions and counseling regarding his condition or for health maintenance advice. Please see specific information pulled into the AVS if appropriate.         This document has been electronically signed by Buzz Sifuentes MD on May 11, 2022 12:54 CDT

## 2022-05-18 ENCOUNTER — HOSPITAL ENCOUNTER (OUTPATIENT)
Dept: CT IMAGING | Facility: HOSPITAL | Age: 72
Discharge: HOME | End: 2022-05-18
Admitting: SURGERY

## 2022-05-18 ENCOUNTER — OFFICE VISIT (OUTPATIENT)
Dept: SURGERY | Facility: CLINIC | Age: 72
End: 2022-05-18

## 2022-05-18 VITALS
HEART RATE: 85 BPM | BODY MASS INDEX: 27.82 KG/M2 | DIASTOLIC BLOOD PRESSURE: 70 MMHG | TEMPERATURE: 96.9 F | WEIGHT: 167 LBS | HEIGHT: 65 IN | SYSTOLIC BLOOD PRESSURE: 116 MMHG

## 2022-05-18 DIAGNOSIS — R10.84 GENERALIZED ABDOMINAL PAIN: Primary | ICD-10-CM

## 2022-05-18 DIAGNOSIS — R10.84 GENERALIZED ABDOMINAL PAIN: ICD-10-CM

## 2022-05-18 PROCEDURE — 74177 CT ABD & PELVIS W/CONTRAST: CPT

## 2022-05-18 PROCEDURE — 99204 OFFICE O/P NEW MOD 45 MIN: CPT | Performed by: SURGERY

## 2022-05-18 PROCEDURE — 25010000002 IOPAMIDOL 61 % SOLUTION: Performed by: SURGERY

## 2022-05-18 RX ADMIN — IOPAMIDOL 90 ML: 612 INJECTION, SOLUTION INTRAVENOUS at 13:53
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
## 2022-05-25 ENCOUNTER — OFFICE VISIT (OUTPATIENT)
Dept: SURGERY | Facility: CLINIC | Age: 72
End: 2022-05-25

## 2022-05-25 VITALS
WEIGHT: 166.4 LBS | TEMPERATURE: 98 F | HEIGHT: 65 IN | HEART RATE: 90 BPM | BODY MASS INDEX: 27.72 KG/M2 | SYSTOLIC BLOOD PRESSURE: 140 MMHG | DIASTOLIC BLOOD PRESSURE: 78 MMHG

## 2022-05-25 DIAGNOSIS — R10.84 GENERALIZED ABDOMINAL PAIN: Primary | ICD-10-CM

## 2022-05-25 PROCEDURE — 99203 OFFICE O/P NEW LOW 30 MIN: CPT | Performed by: SURGERY

## 2022-05-25 NOTE — PROGRESS NOTES
71-year-old gentleman returns today for follow-up after his recent CT scan that was done for history of abdominal pain.  Patient had an episode of acute onset of severe abdominal pain a few weeks ago now.  He was told at [facility] that he had a perforation of his colon.  Initially they consider doing surgery but they did not do surgery and treated with antibiotics.  Report stated that he had a walled off area of perforation and did not have free perforation.  The surgeon evaluated him there and with his mesh in place elected not to do surgery.  Since then patient has gotten better and he presented to us last week.  We ordered a CT scan.  He had mesh placed laparoscopically in 2018 he had not had any problems since then as far as abdominal pain was concerned.  He did have an episode back in November when he was admitted at [facility] and treated with antibiotics for diverticulitis.  At that time he had had a colonoscopy a few days previous no biopsies were obtained and it was felt that he had diverticulitis instead of any injury from his colonoscopy.    CT scan reading suggest that there is no acute findings.  Does have diverticulosis and has an enlarged prostate.  My review of the CT scan suggest he has a very redundant sigmoid colon as well.  Spoke with him and his daughter-in-law about this.  Suspect he may have intermittent diverticulitis but he also may have a volvulus which would go along with what he describes.  That can be very difficult to diagnose.  I do not think the mesh is involved with this based upon history he did not have any problems for 4 years with the mesh in place and look at the CT scan the bowel looks unremarkable.  There is 1 area where it is close to the mesh but for the most part it is away from the mesh as well and the portion that is near the mesh looks normal.  I went over all this with them explained to them the limitations of of what we can say with a CT scan and exam.  If he  has recurrent episodes of this then would asked that he come back to [place] retransferred appear for us to evaluate and take care of him.  If he needs to have an operation on his abdomen then the mesh will need to be dealt with obviously.  He understands he will follow-up with us for has any other issues otherwise he will be seeing his primary care provider Dr. Conner in [place]

## 2022-06-10 ENCOUNTER — TELEPHONE (OUTPATIENT)
Dept: FAMILY MEDICINE CLINIC | Facility: CLINIC | Age: 72
End: 2022-06-10

## 2022-06-10 RX ORDER — AZITHROMYCIN 250 MG/1
TABLET, FILM COATED ORAL
Qty: 6 TABLET | Refills: 0 | Status: SHIPPED | OUTPATIENT
Start: 2022-06-10 | End: 2022-06-27

## 2022-06-10 NOTE — TELEPHONE ENCOUNTER
PATIENT CALLED AND STATED THAT HIS SINUSES ARE  BURNING/ CONGESTION/ SLIGHT COUGH HE WOULD LIKE DEVANTE THOMPSON  SENT TO [facility]. CALL TO LET PATIENT KNOW IF SENT

## 2022-06-27 ENCOUNTER — OFFICE VISIT (OUTPATIENT)
Dept: FAMILY MEDICINE CLINIC | Facility: CLINIC | Age: 72
End: 2022-06-27

## 2022-06-27 VITALS
OXYGEN SATURATION: 95 % | WEIGHT: 167.8 LBS | SYSTOLIC BLOOD PRESSURE: 160 MMHG | TEMPERATURE: 98.2 F | HEART RATE: 83 BPM | HEIGHT: 65 IN | BODY MASS INDEX: 27.96 KG/M2 | DIASTOLIC BLOOD PRESSURE: 82 MMHG

## 2022-06-27 DIAGNOSIS — J32.1 FRONTAL SINUSITIS, UNSPECIFIED CHRONICITY: Primary | ICD-10-CM

## 2022-06-27 DIAGNOSIS — H65.03 BILATERAL ACUTE SEROUS OTITIS MEDIA, RECURRENCE NOT SPECIFIED: ICD-10-CM

## 2022-06-27 DIAGNOSIS — H93.12 TINNITUS OF LEFT EAR: ICD-10-CM

## 2022-06-27 PROCEDURE — 96372 THER/PROPH/DIAG INJ SC/IM: CPT | Performed by: NURSE PRACTITIONER

## 2022-06-27 PROCEDURE — 99213 OFFICE O/P EST LOW 20 MIN: CPT | Performed by: NURSE PRACTITIONER

## 2022-06-27 RX ORDER — TRIAMCINOLONE ACETONIDE 40 MG/ML
60 INJECTION, SUSPENSION INTRA-ARTICULAR; INTRAMUSCULAR ONCE
Status: COMPLETED | OUTPATIENT
Start: 2022-06-27 | End: 2022-06-27

## 2022-06-27 RX ORDER — CIPROFLOXACIN 500 MG/1
500 TABLET, FILM COATED ORAL 2 TIMES DAILY
Qty: 20 TABLET | Refills: 0 | Status: SHIPPED | OUTPATIENT
Start: 2022-06-27 | End: 2022-07-12

## 2022-06-27 RX ADMIN — TRIAMCINOLONE ACETONIDE 60 MG: 40 INJECTION, SUSPENSION INTRA-ARTICULAR; INTRAMUSCULAR at 15:06

## 2022-06-27 NOTE — PROGRESS NOTES
"Chief Complaint  Illness (Head congestion, off balance, both ears ring started last week)    Subjective          Yehuda Rutledge presents to [facility] PRIMARY [facility] - [facility]    PCP: Dr. Sifuentes    CC: \"head congestion, ears ringing, off balance\"    Symptoms x 4-6 weeks.  [facility] and improved for short period, but never went away.  PCP sent in another Zpak on 6-10, mild relief, but now symptoms worsening again. Takes Flonase and OTC antihistamine daily.  Has also added mucinex and saline rinses with little relief.  Having pressure behind eyes with mucus down throat and out of nose with saline rinses.  No significant headaches or fever.  Ears are causing a lot of problems, left is worse and has constant buzzing, feels off balance frequently.      Sinus Problem  Chronicity: persistent. The current episode started more than 1 month ago. The problem has been waxing and waning (gets better briefly with antibiotics, but then worsen again) since onset. There has been no fever. He is experiencing no pain. Associated symptoms include congestion, coughing, ear pain ( pressure, muffled, clogged sensation), sinus pressure (frontal) and a sore throat (irritation). Pertinent negatives include no chills, diaphoresis, headaches, hoarse voice, neck pain, shortness of breath, sneezing or swollen glands. Treatments tried: zpak x 2; saline rinses, flonase, zyrtec, mucinex. The treatment provided mild (helps briefly, but then returns) relief.       Review of Systems   Constitutional: Negative.  Negative for chills and diaphoresis.   HENT: Positive for congestion, ear pain ( pressure, muffled, clogged sensation), hearing loss (feels decreased on left), postnasal drip, rhinorrhea (thick yellow with saline rinse), sinus pressure (frontal), sore throat (irritation) and tinnitus (left ear--buzzing). Negative for hoarse voice, sneezing " "and trouble swallowing. Ear discharge:  occasionally feels like discharge from left.    Eyes: Negative.    Respiratory: Positive for cough. Negative for chest tightness, shortness of breath and wheezing.    Cardiovascular: Negative.    Gastrointestinal: Negative.    Genitourinary: Negative.    Musculoskeletal: Negative.  Negative for neck pain.   Skin: Negative.    Neurological: Negative for headaches. Dizziness:  no dizziness at rest, but balance is off         Objective   Vital Signs:   /82 (BP Location: Right arm, Patient Position: Sitting)   Pulse 83   Temp 98.2 °F (36.8 °C)   Ht 165.1 cm (65\")   Wt 76.1 kg (167 lb 12.8 oz)   SpO2 95%   BMI 27.92 kg/m²       Physical Exam  Vitals and nursing note reviewed.   Constitutional:       General: He is not in acute distress.     Appearance: Normal appearance. He is not ill-appearing.   HENT:      Head: Normocephalic and atraumatic.      Right Ear: Ear canal normal. Tenderness (eust tube) present. A middle ear effusion is present.      Left Ear: Ear canal normal. Tenderness ( eust tube) present. A middle ear effusion is present. Tympanic membrane is injected ( mild).      Nose: Congestion ( mild) present.      Comments: +TTP behind eyes/frontal       Mouth/Throat:      Mouth: Mucous membranes are moist.      Pharynx: No oropharyngeal exudate or posterior oropharyngeal erythema.      Comments: +PND    Eyes:      General:         Right eye: No discharge.         Left eye: No discharge.      Conjunctiva/sclera: Conjunctivae normal.   Cardiovascular:      Rate and Rhythm: Normal rate and regular rhythm.      Comments: +faint    Pulmonary:      Effort: Pulmonary effort is normal. No respiratory distress.      Breath sounds: Normal breath sounds. No wheezing, rhonchi or rales.      Comments: +loose cough  Musculoskeletal:      Cervical back: Neck supple. Tenderness ( safia eust tubes) present.   Lymphadenopathy:      Cervical: No cervical adenopathy.   Skin:     " General: Skin is warm and dry.   Neurological:      General: No focal deficit present.      Mental Status: He is alert and oriented to person, place, and time.   Psychiatric:         Mood and Affect: Mood normal.         Thought Content: Thought content normal.          Result Review :     Common labs    Common Labsle 5/2/22 5/2/22 5/2/22    1027 1027 1027   Glucose  112 (A)    BUN  9    Creatinine  0.89    Sodium  138    Potassium  5.6 (A)    Chloride  101    Calcium  10.1    Albumin  4.50    Total Bilirubin  0.3    Alkaline Phosphatase  108    AST (SGOT)  19    ALT (SGPT)  13    WBC 11.30 (A)     Hemoglobin 12.7 (A)     Hematocrit 38.3     Platelets 393     PSA   5.350 (A)   (A) Abnormal value                      Assessment and Plan    Diagnoses and all orders for this visit:    1. Frontal sinusitis, unspecified chronicity (Primary)  -     triamcinolone acetonide (KENALOG-40) injection 60 mg  -     ciprofloxacin (Cipro) 500 MG tablet; Take 1 tablet by mouth 2 (Two) Times a Day.  Dispense: 20 tablet; Refill: 0    2. Bilateral acute serous otitis media, recurrence not specified  -     triamcinolone acetonide (KENALOG-40) injection 60 mg  -     ciprofloxacin (Cipro) 500 MG tablet; Take 1 tablet by mouth 2 (Two) Times a Day.  Dispense: 20 tablet; Refill: 0    3. Tinnitus of left ear      Continue with Flonase, Zyrtec, Saline rinses, Mucinex  Can try Afrin for 2-3 days, but then D/C  Rx for Cipro x 10 days  Kenalog 60 mg IM x 1 in office  Change positions slowly, caution while walking, using stairs, etc    If symptoms persist with ears, will let us know and can refer to ENT    BP slightly elevated today, but previous readings okay.  Recheck was 140/98.  Will continue to monitor with PCP--has f/u in July.     See PCP or RTC if symptoms persist/worsen  See PCP for routine f/u visit and management of chronic medical conditions      This document has been electronically signed by YOU Mcallister on June 27, 2022 15:05  CDT,.      .

## 2022-06-27 NOTE — PATIENT INSTRUCTIONS
Sinusitis, Adult  Sinusitis is inflammation of your sinuses. Sinuses are hollow spaces in the bones around your face. Your sinuses are located:  Around your eyes.  In the middle of your forehead.  Behind your nose.  In your cheekbones.  Mucus normally drains out of your sinuses. When your nasal tissues become inflamed or swollen, mucus can become trapped or blocked. This allows bacteria, viruses, and fungi to grow, which leads to infection. Most infections of the sinuses are caused by a virus.  Sinusitis can develop quickly. It can last for up to 4 weeks (acute) or for more than 12 weeks (chronic). Sinusitis often develops after a cold.  What are the causes?  This condition is caused by anything that creates swelling in the sinuses or stops mucus from draining. This includes:  Allergies.  Asthma.  Infection from bacteria or viruses.  Deformities or blockages in your nose or sinuses.  Abnormal growths in the nose (nasal polyps).  Pollutants, such as chemicals or irritants in the air.  Infection from fungi (rare).  What increases the risk?  You are more likely to develop this condition if you:  Have a weak body defense system (immune system).  Do a lot of swimming or diving.  Overuse nasal sprays.  Smoke.  What are the signs or symptoms?  The main symptoms of this condition are pain and a feeling of pressure around the affected sinuses. Other symptoms include:  Stuffy nose or congestion.  Thick drainage from your nose.  Swelling and warmth over the affected sinuses.  Headache.  Upper toothache.  A cough that may get worse at night.  Extra mucus that collects in the throat or the back of the nose (postnasal drip).  Decreased sense of smell and taste.  Fatigue.  A fever.  Sore throat.  Bad breath.  How is this diagnosed?  This condition is diagnosed based on:  Your symptoms.  Your medical history.  A physical exam.  Tests to find out if your condition is acute or chronic. This may include:  Checking your nose for nasal  polyps.  Viewing your sinuses using a device that has a light (endoscope).  Testing for allergies or bacteria.  Imaging tests, such as an MRI or CT scan.  In rare cases, a bone biopsy may be done to rule out more serious types of fungal sinus disease.  How is this treated?  Treatment for sinusitis depends on the cause and whether your condition is chronic or acute.  If caused by a virus, your symptoms should go away on their own within 10 days. You may be given medicines to relieve symptoms. They include:  Medicines that shrink swollen nasal passages (topical intranasal decongestants).  Medicines that treat allergies (antihistamines).  A spray that eases inflammation of the nostrils (topical intranasal corticosteroids).  Rinses that help get rid of thick mucus in your nose (nasal saline washes).  If caused by bacteria, your health care provider may recommend waiting to see if your symptoms improve. Most bacterial infections will get better without antibiotic medicine. You may be given antibiotics if you have:  A severe infection.  A weak immune system.  If caused by narrow nasal passages or nasal polyps, you may need to have surgery.  Follow these instructions at home:  Medicines  Take, use, or apply over-the-counter and prescription medicines only as told by your health care provider. These may include nasal sprays.  If you were prescribed an antibiotic medicine, take it as told by your health care provider. Do not stop taking the antibiotic even if you start to feel better.  Hydrate and humidify    Drink enough fluid to keep your urine pale yellow. Staying hydrated will help to thin your mucus.  Use a cool mist humidifier to keep the humidity level in your home above 50%.  Inhale steam for 10-15 minutes, 3-4 times a day, or as told by your health care provider. You can do this in the bathroom while a hot shower is running.  Limit your exposure to cool or dry air.    Rest  Rest as much as possible.  Sleep with your  head raised (elevated).  Make sure you get enough sleep each night.  General instructions    Apply a warm, moist washcloth to your face 3-4 times a day or as told by your health care provider. This will help with discomfort.  Wash your hands often with soap and water to reduce your exposure to germs. If soap and water are not available, use hand .  Do not smoke. Avoid being around people who are smoking (secondhand smoke).  Keep all follow-up visits as told by your health care provider. This is important.    Contact a health care provider if:  You have a fever.  Your symptoms get worse.  Your symptoms do not improve within 10 days.  Get help right away if:  You have a severe headache.  You have persistent vomiting.  You have severe pain or swelling around your face or eyes.  You have vision problems.  You develop confusion.  Your neck is stiff.  You have trouble breathing.  Summary  Sinusitis is soreness and inflammation of your sinuses. Sinuses are hollow spaces in the bones around your face.  This condition is caused by nasal tissues that become inflamed or swollen. The swelling traps or blocks the flow of mucus. This allows bacteria, viruses, and fungi to grow, which leads to infection.  If you were prescribed an antibiotic medicine, take it as told by your health care provider. Do not stop taking the antibiotic even if you start to feel better.  Keep all follow-up visits as told by your health care provider. This is important.  This information is not intended to replace advice given to you by your health care provider. Make sure you discuss any questions you have with your health care provider.  Document Revised: 05/20/2019 Document Reviewed: 05/20/2019  [employer] Patient Education © 2021 [employer]

## 2022-07-12 ENCOUNTER — OFFICE VISIT (OUTPATIENT)
Dept: FAMILY MEDICINE CLINIC | Facility: CLINIC | Age: 72
End: 2022-07-12

## 2022-07-12 VITALS
WEIGHT: 164 LBS | HEIGHT: 65 IN | HEART RATE: 81 BPM | DIASTOLIC BLOOD PRESSURE: 82 MMHG | BODY MASS INDEX: 27.32 KG/M2 | OXYGEN SATURATION: 97 % | TEMPERATURE: 97.3 F | SYSTOLIC BLOOD PRESSURE: 142 MMHG

## 2022-07-12 DIAGNOSIS — Z00.00 MEDICARE ANNUAL WELLNESS VISIT, SUBSEQUENT: Primary | ICD-10-CM

## 2022-07-12 PROCEDURE — 1126F AMNT PAIN NOTED NONE PRSNT: CPT | Performed by: FAMILY MEDICINE

## 2022-07-12 PROCEDURE — 1170F FXNL STATUS ASSESSED: CPT | Performed by: FAMILY MEDICINE

## 2022-07-12 PROCEDURE — G0439 PPPS, SUBSEQ VISIT: HCPCS | Performed by: FAMILY MEDICINE

## 2022-07-12 PROCEDURE — 1159F MED LIST DOCD IN RCRD: CPT | Performed by: FAMILY MEDICINE

## 2022-07-12 NOTE — PROGRESS NOTES
The ABCs of the Annual Wellness Visit  Subsequent Medicare Wellness Visit    Chief Complaint   Patient presents with   • Annual Exam     Subsequent Medicare Wellness      Subjective    History of Present Illness:  Yehuda Rutledge is a 71 y.o. male who presents for a Subsequent Medicare Wellness Visit.    The following portions of the patient's history were reviewed and   updated as appropriate: allergies, current medications, past family history, past medical history, past social history, past surgical history and problem list.    Compared to one year ago, the patient feels his physical   health is better.    Compared to one year ago, the patient feels his mental   health is the same.    Recent Hospitalizations:  He was admitted within the past 365 days at [facility].       Current Medical Providers:  Patient Care Team:  Buzz Sifuentes MD as PCP - General  Sincere Santamaria OD (Optometry)    Outpatient Medications Prior to Visit   Medication Sig Dispense Refill   • albuterol (PROVENTIL HFA;VENTOLIN HFA) 108 (90 BASE) MCG/ACT inhaler Inhale 2 puffs Every 4 (Four) Hours As Needed for Shortness of Air or Wheezing. 2 puffs by  Mouth every 4-6 hours     • Ascorbic Acid (VITAMIN C PO) Take 1 tablet by mouth Daily.     • aspirin 81 MG EC tablet Take 81 mg by mouth daily.     • cetirizine (zyrTEC) 10 MG tablet Take 1 tablet by mouth Daily. 90 tablet 1   • fluticasone (FLONASE) 50 MCG/ACT nasal spray 2 sprays into the nostril(s) as directed by provider Daily. Administer 2 sprays in each nostril for each dose. 18.2 mL 5   • gabapentin (NEURONTIN) 100 MG capsule Take 1 capsule by mouth 2 (Two) Times a Day. 60 capsule 3   • guaiFENesin (MUCINEX PO) Take 1 tablet by mouth As Needed.     • Misc Natural Products (OSTEO BI-FLEX JOINT SHIELD PO) Take 1 tablet by mouth Daily.     • Misc Natural Products (PROSTATE HEALTH) capsule Take 1 capsule by mouth 2 (Two) Times a Day.     • Multiple Vitamins-Minerals (MULTIVITAMIN  ADULT PO) Take 1 tablet by mouth Daily.     • Multiple Vitamins-Minerals (ZINC PO) Take 1 tablet by mouth Daily.     • Omega-3 Fatty Acids (FISH OIL) 1000 MG capsule capsule Take 1,000 mg by mouth Daily With Breakfast.     • polyethylene glycol (MIRALAX) 17 g packet Take 17 g by mouth 2 (Two) Times a Day As Needed (Constipation). 20 packet 1   • pravastatin (PRAVACHOL) 20 MG tablet Take 1 tablet by mouth Every Night. 90 tablet 1   • sodium chloride 0.65 % nasal spray 1 spray into the nostril(s) as directed by provider As Needed.     • traMADol (ULTRAM) 50 MG tablet Take 1 tablet by mouth Every 6 (Six) Hours As Needed for Moderate Pain . 24 tablet 1   • ciprofloxacin (Cipro) 500 MG tablet Take 1 tablet by mouth 2 (Two) Times a Day. 20 tablet 0     No facility-administered medications prior to visit.       Opioid medication/s are on active medication list.  and I have evaluated his active treatment plan and pain score trends (see table).  Vitals:    07/12/22 0913   PainSc: 0-No pain     I have reviewed the chart for potential of high risk medication and harmful drug interactions in the elderly.            Aspirin is on active medication list. Aspirin use is indicated based on review of current medical condition/s. Pros and cons of this therapy have been discussed today. Benefits of this medication outweigh potential harm.  Patient has been encouraged to continue taking this medication.  .      Patient Active Problem List   Diagnosis   • Simple chronic bronchitis (HCC)   • Environmental allergies   • Right-sided low back pain with right-sided sciatica   • Overweight (BMI 25.0-29.9)   • Stage 2 moderate COPD by GOLD classification (HCC)   • Adenopathy, cervical   • High risk medication use   • Chronic cervical radiculopathy   • Incisional hernia, without obstruction or gangrene   • Diabetes mellitus screening   • Hyperlipidemia   • Hyperglycemia   • Polyuria   • H/O umbilical hernia repair   • Former smoker   •  "Arthritis   • Actinic keratosis   • Routine medical exam   • Foot pain, bilateral   • Insomnia   • Bilateral foot pain   • Lumbago with sciatica   • Chronic fatigue   • Dyspnea on exertion   • Right-sided chest pain   • Personal history of tobacco use, presenting hazards to health   • Chronic allergic rhinitis   • Need for immunization against influenza   • Diverticulitis   • Generalized abdominal pain     Advance Care Planning  Advance Directive is not on file.  ACP discussion was held with the patient during this visit. Patient has an advance directive (not in EMR), copy requested.          Objective    Vitals:    22 0913   BP: 142/82   BP Location: Left arm   Patient Position: Sitting   Cuff Size: Adult   Pulse: 81   Temp: 97.3 °F (36.3 °C)   TempSrc: Temporal   SpO2: 97%   Weight: 74.4 kg (164 lb)   Height: 165.1 cm (65\")   PainSc: 0-No pain     Estimated body mass index is 27.29 kg/m² as calculated from the following:    Height as of this encounter: 165.1 cm (65\").    Weight as of this encounter: 74.4 kg (164 lb).    BMI is >= 25 and <30. (Overweight) The following options were offered after discussion;: weight loss educational material (shared in after visit summary), exercise counseling/recommendations, nutrition counseling/recommendations and referral to primary care      Does the patient have evidence of cognitive impairment? No    Physical Exam            HEALTH RISK ASSESSMENT    Smoking Status:  Social History     Tobacco Use   Smoking Status Former Smoker   • Packs/day: 1.00   • Years: 40.00   • Pack years: 40.00   • Types: Cigarettes   • Quit date: 2017   • Years since quittin.6   Smokeless Tobacco Never Used     Alcohol Consumption:  Social History     Substance and Sexual Activity   Alcohol Use No     Fall Risk Screen:    STEADI Fall Risk Assessment was completed, and patient is at MODERATE risk for falls. Assessment completed on:2022    Depression Screening:  PHQ-2/PHQ-9 " Depression Screening 7/12/2022   Retired PHQ-9 Total Score -   Retired Total Score -   Little Interest or Pleasure in Doing Things 0-->not at all   Feeling Down, Depressed or Hopeless 0-->not at all   PHQ-9: Brief Depression Severity Measure Score 0       Health Habits and Functional and Cognitive Screening:  Functional & Cognitive Status 7/12/2022   Do you have difficulty preparing food and eating? No   Do you have difficulty bathing yourself, getting dressed or grooming yourself? No   Do you have difficulty using the toilet? No   Do you have difficulty moving around from place to place? No   Do you have trouble with steps or getting out of a bed or a chair? No   Current Diet Well Balanced Diet   Dental Exam Up to date   Eye Exam Up to date   Exercise (times per week) 7 times per week   Current Exercises Include Walking;Light Weights;Running/Jogging   Current Exercise Activities Include -   Do you need help using the phone?  No   Are you deaf or do you have serious difficulty hearing?  No   Do you need help with transportation? No   Do you need help shopping? No   Do you need help preparing meals?  No   Do you need help with housework?  No   Do you need help with laundry? No   Do you need help taking your medications? No   Do you need help managing money? No   Do you ever drive or ride in a car without wearing a seat belt? Yes   Have you felt unusual stress, anger or loneliness in the last month? No   Who do you live with? Alone   If you need help, do you have trouble finding someone available to you? No   Have you been bothered in the last four weeks by sexual problems? No   Do you have difficulty concentrating, remembering or making decisions? Yes       Age-appropriate Screening Schedule:  Refer to the list below for future screening recommendations based on patient's age, sex and/or medical conditions. Orders for these recommended tests are listed in the plan section. The patient has been provided with a written  plan.    Health Maintenance   Topic Date Due   • LIPID PANEL  09/25/2021   • ZOSTER VACCINE (2 of 2) 11/02/2022 (Originally 5/16/2018)   • INFLUENZA VACCINE  10/01/2022   • TDAP/TD VACCINES (2 - Tdap) 07/31/2025              Assessment & Plan   CMS Preventative Services Quick Reference  Risk Factors Identified During Encounter  Chronic Pain   Obesity/Overweight   Diverticular disease  The above risks/problems have been discussed with the patient.  Follow up actions/plans if indicated are seen below in the Assessment/Plan Section.  Pertinent information has been shared with the patient in the After Visit Summary.    Diagnoses and all orders for this visit:    1. Medicare annual wellness visit, subsequent (Primary)        Follow Up:   Return in about 1 year (around 7/12/2023) for Medicare Wellness Subsequent.     An After Visit Summary and PPPS were made available to the patient.                      This document has been electronically signed by Buzz Sifuentes MD on July 12, 2022 09:44 CDT

## 2022-07-12 NOTE — PATIENT INSTRUCTIONS
Medicare Wellness  Personal Prevention Plan of Service     Date of Office Visit:    Encounter Provider:  Buzz Sifuentes MD  Place of Service:  [facility] PRIMARY CARE [facility]  Patient Name: Yehuda Rutledge  :  1950    As part of the Medicare Wellness portion of your visit today, we are providing you with this personalized preventive plan of services (PPPS). This plan is based upon recommendations of the [place] Preventive Services Task Force (USPSTF) and the Advisory Committee on Immunization Practices (ACIP).    This lists the preventive care services that should be considered, and provides dates of when you are due. Items listed as completed are up-to-date and do not require any further intervention.    Health Maintenance   Topic Date Due    LUNG CANCER SCREENING  2020    COVID-19 Vaccine (3 - Booster for Pfizer series) 2021    LIPID PANEL  2021    ZOSTER VACCINE (2 of 2) 2022 (Originally 2018)    INFLUENZA VACCINE  10/01/2022    ANNUAL WELLNESS VISIT  2023    TDAP/TD VACCINES (2 - Tdap) 2025    COLORECTAL CANCER SCREENING  2031    HEPATITIS C SCREENING  Completed    Pneumococcal Vaccine 65+  Completed    AAA SCREEN (ONE-TIME)  Completed       No orders of the defined types were placed in this encounter.      Return in about 1 year (around 2023) for Medicare Wellness Subsequent.

## 2022-07-26 ENCOUNTER — TELEPHONE (OUTPATIENT)
Dept: FAMILY MEDICINE CLINIC | Facility: CLINIC | Age: 72
End: 2022-07-26

## 2022-07-26 RX ORDER — CIPROFLOXACIN 500 MG/1
500 TABLET, FILM COATED ORAL 2 TIMES DAILY
Qty: 10 TABLET | Refills: 0 | Status: SHIPPED | OUTPATIENT
Start: 2022-07-26 | End: 2022-08-11

## 2022-07-26 RX ORDER — PREDNISONE 10 MG/1
10 TABLET ORAL SEE ADMIN INSTRUCTIONS
Qty: 17 TABLET | Refills: 0 | Status: SHIPPED | OUTPATIENT
Start: 2022-07-26 | End: 2022-08-11

## 2022-08-02 ENCOUNTER — TELEPHONE (OUTPATIENT)
Dept: FAMILY MEDICINE CLINIC | Facility: CLINIC | Age: 72
End: 2022-08-02

## 2022-08-02 DIAGNOSIS — H93.93 PROBLEM OF BOTH EARS: Primary | ICD-10-CM

## 2022-08-02 NOTE — TELEPHONE ENCOUNTER
Patient called and stated that he would like a referral to ENT for his ears. He states that he has been having trouble (ear infections) and would like to see one. Has appointment next week

## 2022-08-03 NOTE — TELEPHONE ENCOUNTER
Attempted to call pt to let know okay for referral and who would he like to see. No answer at both numbers. No VM.

## 2022-08-03 NOTE — TELEPHONE ENCOUNTER
Patient returned your called and stated that he is ok seeing Dr. Mendez . He prefers [place] location. And Thanks Ms. Narayan for taking care of this

## 2022-08-11 ENCOUNTER — OFFICE VISIT (OUTPATIENT)
Dept: FAMILY MEDICINE CLINIC | Facility: CLINIC | Age: 72
End: 2022-08-11

## 2022-08-11 VITALS
DIASTOLIC BLOOD PRESSURE: 72 MMHG | SYSTOLIC BLOOD PRESSURE: 130 MMHG | OXYGEN SATURATION: 98 % | TEMPERATURE: 97.6 F | WEIGHT: 164 LBS | HEART RATE: 74 BPM | HEIGHT: 65 IN | BODY MASS INDEX: 27.32 KG/M2

## 2022-08-11 DIAGNOSIS — M54.12 CHRONIC CERVICAL RADICULOPATHY: ICD-10-CM

## 2022-08-11 DIAGNOSIS — M54.41 CHRONIC BILATERAL LOW BACK PAIN WITH BILATERAL SCIATICA: ICD-10-CM

## 2022-08-11 DIAGNOSIS — H93.93 EAR PROBLEM, BILATERAL: ICD-10-CM

## 2022-08-11 DIAGNOSIS — G89.29 CHRONIC BILATERAL LOW BACK PAIN WITH BILATERAL SCIATICA: ICD-10-CM

## 2022-08-11 DIAGNOSIS — M54.42 CHRONIC BILATERAL LOW BACK PAIN WITH BILATERAL SCIATICA: ICD-10-CM

## 2022-08-11 PROCEDURE — 99214 OFFICE O/P EST MOD 30 MIN: CPT | Performed by: FAMILY MEDICINE

## 2022-08-11 RX ORDER — CETIRIZINE HYDROCHLORIDE, PSEUDOEPHEDRINE HYDROCHLORIDE 5; 120 MG/1; MG/1
1 TABLET, FILM COATED, EXTENDED RELEASE ORAL 2 TIMES DAILY
Qty: 60 TABLET | Refills: 1 | Status: SHIPPED | OUTPATIENT
Start: 2022-08-11 | End: 2022-11-28 | Stop reason: SDUPTHER

## 2022-08-11 RX ORDER — AZITHROMYCIN 250 MG/1
TABLET, FILM COATED ORAL
Qty: 6 TABLET | Refills: 0 | Status: SHIPPED | OUTPATIENT
Start: 2022-08-11 | End: 2022-08-31

## 2022-08-11 RX ORDER — PREDNISONE 10 MG/1
10 TABLET ORAL SEE ADMIN INSTRUCTIONS
Qty: 17 TABLET | Refills: 0 | Status: SHIPPED | OUTPATIENT
Start: 2022-08-11 | End: 2022-08-31

## 2022-08-11 RX ORDER — GABAPENTIN 100 MG/1
100 CAPSULE ORAL 2 TIMES DAILY
Qty: 60 CAPSULE | Refills: 3 | Status: SHIPPED | OUTPATIENT
Start: 2022-08-11 | End: 2022-11-28 | Stop reason: SDUPTHER

## 2022-08-11 NOTE — PROGRESS NOTES
Chief Complaint  Back Pain, COPD (/), Hyperlipidemia, and Ear Fullness    Subjective          Review of Systems   HENT: Positive for hearing loss. Negative for facial swelling, mouth sores and sinus pain.    Eyes: Negative for pain, discharge, itching and visual disturbance.   Respiratory: Negative for chest tightness.    Cardiovascular: Negative for palpitations and leg swelling.   Gastrointestinal: Positive for constipation. Negative for blood in stool and diarrhea.        Recent flare diverticulitis(Possible contained perforation) Resolved   Endocrine: Negative.  Negative for cold intolerance and heat intolerance.   Genitourinary: Negative.  Negative for difficulty urinating, dysuria, hematuria and urgency.   Musculoskeletal: Positive for back pain.        L Neck and Back pain stable, Neurontin helps.   Skin: Negative.  Negative for color change, pallor and wound.   Allergic/Immunologic: Positive for environmental allergies. Negative for food allergies and immunocompromised state.   Neurological: Negative for tremors and speech difficulty.   Hematological: Negative for adenopathy. Does not bruise/bleed easily.   Psychiatric/Behavioral: Negative for agitation, dysphoric mood and sleep disturbance. The patient is not nervous/anxious and is not hyperactive.        Yehuda Rutledge presents to [facility] - [place]  Back Pain  This is a chronic problem. The current episode started more than 1 year ago. The problem occurs daily. The problem has been waxing and waning since onset. The pain is present in the lumbar spine. The pain is at a severity of 5/10. The pain is moderate. Pertinent negatives include no dysuria. He has tried analgesics, NSAIDs, muscle relaxant and bed rest (Neurontin) for the symptoms. The treatment provided moderate relief.   Neck Pain   This is a chronic problem. The current episode started more than 1 year ago. The problem occurs daily. The problem  "has been waxing and waning. The pain is at a severity of 4/10. The pain is moderate. He has tried acetaminophen, NSAIDs, home exercises, chiropractic manipulation and muscle relaxants (Neurontin) for the symptoms. The treatment provided mild relief.   Ear Fullness   There is pain in both ears. This is a new problem. The current episode started more than 1 year ago. The problem occurs constantly. The problem has been waxing and waning. The pain is at a severity of 3/10. The pain is mild. Associated symptoms include hearing loss. Pertinent negatives include no diarrhea. Associated symptoms comments: Fullness in ears, dizziness, occasional ringing in ears. . He has tried acetaminophen and antibiotics for the symptoms. The treatment provided mild relief. His past medical history is significant for hearing loss.       Objective   Vital Signs:   /72   Pulse 74   Temp 97.6 °F (36.4 °C)   Ht 165.1 cm (65\")   Wt 74.4 kg (164 lb)   SpO2 98%   BMI 27.29 kg/m²     Physical Exam  Vitals and nursing note reviewed.   Constitutional:       General: He is not in acute distress.     Appearance: He is obese.   HENT:      Head: Atraumatic.      Comments: Can't clear ET, attempts cause dizzy spell and ringing in ears.      Right Ear: Tympanic membrane, ear canal and external ear normal. There is no impacted cerumen.      Left Ear: Tympanic membrane, ear canal and external ear normal. There is no impacted cerumen.      Nose: No congestion or rhinorrhea.      Comments: [place] nasal drainage.      Mouth/Throat:      Mouth: Mucous membranes are moist.      Pharynx: No oropharyngeal exudate or posterior oropharyngeal erythema.   Eyes:      General:         Right eye: No discharge.         Left eye: No discharge.      Conjunctiva/sclera: Conjunctivae normal.      Pupils: Pupils are equal, round, and reactive to light.   Neck:      Comments: L neck and shoulder pain.  Cardiovascular:      Rate and Rhythm: Normal rate and regular " rhythm.      Heart sounds: No murmur heard.    No gallop.   Pulmonary:      Effort: Pulmonary effort is normal.      Breath sounds: No wheezing or rhonchi.   Abdominal:      General: Bowel sounds are normal.      Palpations: Abdomen is soft.      Tenderness: There is abdominal tenderness. There is no right CVA tenderness, left CVA tenderness, guarding or rebound.      Hernia: A hernia is present.      Comments: Abd tenderness improved from last visit.     Possible small hernia at surgical sites.    Musculoskeletal:      Cervical back: Normal range of motion. Muscular tenderness present.      Right lower leg: No edema.      Left lower leg: No edema.   Skin:     General: Skin is warm and dry.      Capillary Refill: Capillary refill takes 2 to 3 seconds.      Coloration: Skin is not jaundiced.      Findings: No bruising, erythema or lesion.   Neurological:      General: No focal deficit present.      Mental Status: He is oriented to person, place, and time.      Cranial Nerves: No cranial nerve deficit.      Sensory: No sensory deficit.      Motor: No weakness.      Coordination: Coordination normal.      Gait: Gait normal.      Deep Tendon Reflexes: Reflexes normal.   Psychiatric:         Mood and Affect: Mood normal.         Behavior: Behavior normal.         Thought Content: Thought content normal.         Judgment: Judgment normal.        Result Review :                 Assessment and Plan    Diagnoses and all orders for this visit:    1. Chronic cervical radiculopathy  -     gabapentin (NEURONTIN) 100 MG capsule; Take 1 capsule by mouth 2 (Two) Times a Day.  Dispense: 60 capsule; Refill: 3    2. Chronic bilateral low back pain with bilateral sciatica  -     gabapentin (NEURONTIN) 100 MG capsule; Take 1 capsule by mouth 2 (Two) Times a Day.  Dispense: 60 capsule; Refill: 3    3. Ear problem, bilateral  -     azithromycin (Zithromax) 250 MG tablet; Take 2 tablets the first day, then 1 tablet daily for 4 days.   Dispense: 6 tablet; Refill: 0  -     predniSONE (DELTASONE) 10 MG tablet; Take 1 tablet by mouth See Admin Instructions. Take 1 Tab Tid x3 days, Then 1 Tab Bid x 2 days Then 1 Tab QD x4 days,then D/C  Dispense: 17 tablet; Refill: 0  -     cetirizine-pseudoephedrine (ZyrTEC-D) 5-120 MG per 12 hr tablet; Take 1 tablet by mouth 2 (Two) Times a Day.  Dispense: 60 tablet; Refill: 1        Follow Up   Return in about 8 weeks (around 10/6/2022).  Patient was given instructions and counseling regarding his condition or for health maintenance advice. Please see specific information pulled into the AVS if appropriate.         This document has been electronically signed by Buzz Sifuentes MD on August 11, 2022 12:45 CDT

## 2022-08-31 ENCOUNTER — LAB (OUTPATIENT)
Dept: LAB | Facility: HOSPITAL | Age: 72
End: 2022-08-31

## 2022-08-31 ENCOUNTER — OFFICE VISIT (OUTPATIENT)
Dept: FAMILY MEDICINE CLINIC | Facility: CLINIC | Age: 72
End: 2022-08-31

## 2022-08-31 VITALS
HEART RATE: 104 BPM | BODY MASS INDEX: 26.24 KG/M2 | DIASTOLIC BLOOD PRESSURE: 78 MMHG | OXYGEN SATURATION: 98 % | SYSTOLIC BLOOD PRESSURE: 136 MMHG | TEMPERATURE: 97.5 F | HEIGHT: 65 IN | WEIGHT: 157.5 LBS

## 2022-08-31 DIAGNOSIS — Z87.898 H/O ABDOMINAL ABSCESS: Primary | ICD-10-CM

## 2022-08-31 DIAGNOSIS — Z87.898 H/O ABDOMINAL ABSCESS: ICD-10-CM

## 2022-08-31 PROCEDURE — 99213 OFFICE O/P EST LOW 20 MIN: CPT | Performed by: FAMILY MEDICINE

## 2022-08-31 PROCEDURE — 86140 C-REACTIVE PROTEIN: CPT

## 2022-08-31 PROCEDURE — 85025 COMPLETE CBC W/AUTO DIFF WBC: CPT

## 2022-08-31 PROCEDURE — 1111F DSCHRG MED/CURRENT MED MERGE: CPT | Performed by: FAMILY MEDICINE

## 2022-08-31 RX ORDER — FLUCONAZOLE 100 MG/1
TABLET ORAL
COMMUNITY
Start: 2022-08-25 | End: 2022-11-01

## 2022-08-31 RX ORDER — LISINOPRIL 10 MG/1
1 TABLET ORAL DAILY
COMMUNITY
Start: 2022-08-25

## 2022-08-31 RX ORDER — METRONIDAZOLE 500 MG/1
TABLET ORAL
COMMUNITY
Start: 2022-08-25 | End: 2022-11-01

## 2022-08-31 RX ORDER — CIPROFLOXACIN 500 MG/1
TABLET, FILM COATED ORAL
COMMUNITY
Start: 2022-08-25 | End: 2022-08-31

## 2022-08-31 NOTE — PROGRESS NOTES
Chief Complaint  Hospital Follow Up Visit (Discharged from [facility]) and Abdominal abscess  'Will have drain removed on Sept 13th'   Subjective          Review of Systems   Constitutional: Negative for fever.   HENT: Positive for hearing loss. Negative for facial swelling, mouth sores and sinus pain.    Eyes: Negative for pain, discharge, itching and visual disturbance.   Respiratory: Negative for chest tightness.    Cardiovascular: Negative for palpitations and leg swelling.   Gastrointestinal: Positive for constipation. Negative for abdominal pain, blood in stool and diarrhea.        Recent probable diverticulitis with abd abscess, S/P percutaneous drain.    Endocrine: Negative.  Negative for cold intolerance and heat intolerance.   Genitourinary: Negative.  Negative for difficulty urinating, dysuria, hematuria and urgency.   Musculoskeletal: Positive for arthralgias and back pain. Negative for myalgias.        L Neck and Back pain stable, Neurontin helps.   Skin: Negative.  Negative for color change, pallor and wound.   Allergic/Immunologic: Positive for environmental allergies. Negative for food allergies and immunocompromised state.   Neurological: Negative for tremors, speech difficulty and headaches.   Hematological: Negative for adenopathy. Does not bruise/bleed easily.   Psychiatric/Behavioral: Negative for agitation, dysphoric mood and sleep disturbance. The patient is not nervous/anxious and is not hyperactive.        Yehuda Rutledge presents to [facility] - [place]  Abdominal Pain  This is a recurrent problem. The current episode started more than 1 month ago. The problem occurs intermittently. The problem has been waxing and waning. The pain is located in the LUQ and periumbilical region. The pain is at a severity of 2/10. The pain is mild. The quality of the pain is cramping. The abdominal pain radiates to the periumbilical region. Associated symptoms  "include arthralgias and constipation. Pertinent negatives include no diarrhea, dysuria, fever, headaches, hematuria or myalgias. The pain is aggravated by eating. Relieved by: Antibiotic, S/P Percutaneous drain. He has tried H2 blockers, proton pump inhibitors, antibiotics and antacids (Abx , Percutaneos drain) for the symptoms. The treatment provided significant relief. Prior diagnostic workup includes GI consult, lower endoscopy and CT scan (Saw Dr. Collins at [facility], Transferred to [facility]). His past medical history is significant for abdominal surgery. Diverticulitis       Objective   Vital Signs:   /78 (BP Location: Right arm, Patient Position: Sitting, Cuff Size: Adult)   Pulse 104   Temp 97.5 °F (36.4 °C) (Temporal)   Ht 165.1 cm (65\")   Wt 71.4 kg (157 lb 8 oz)   SpO2 98%   BMI 26.21 kg/m²     Physical Exam  Vitals and nursing note reviewed.   Constitutional:       General: He is not in acute distress.     Appearance: He is obese.   HENT:      Head: Atraumatic.      Comments: Can't clear ET, attempts cause dizzy spell and ringing in ears.      Right Ear: Tympanic membrane, ear canal and external ear normal. There is no impacted cerumen.      Left Ear: Tympanic membrane, ear canal and external ear normal. There is no impacted cerumen.      Nose: No congestion or rhinorrhea.      Mouth/Throat:      Mouth: Mucous membranes are moist.      Pharynx: No oropharyngeal exudate or posterior oropharyngeal erythema.   Eyes:      General:         Right eye: No discharge.         Left eye: No discharge.      Conjunctiva/sclera: Conjunctivae normal.      Pupils: Pupils are equal, round, and reactive to light.   Neck:      Comments: L neck and shoulder pain.  Cardiovascular:      Rate and Rhythm: Normal rate and regular rhythm.      Heart sounds: No murmur heard.    No gallop.   Pulmonary:      Effort: Pulmonary effort is normal.      Breath sounds: No wheezing or rhonchi.   Abdominal:      General: Bowel " sounds are normal.      Palpations: Abdomen is soft.      Tenderness: There is abdominal tenderness. There is no right CVA tenderness, left CVA tenderness, guarding or rebound.      Hernia: A hernia is present.      Comments:   Percutaneous drain with scant serosanguinous fluid       Musculoskeletal:      Cervical back: Normal range of motion. Muscular tenderness present.      Right lower leg: No edema.      Left lower leg: No edema.   Skin:     General: Skin is warm and dry.      Capillary Refill: Capillary refill takes 2 to 3 seconds.      Coloration: Skin is not jaundiced.      Findings: No bruising, erythema or lesion.   Neurological:      General: No focal deficit present.      Mental Status: He is oriented to person, place, and time.      Cranial Nerves: No cranial nerve deficit.      Sensory: No sensory deficit.      Motor: No weakness.      Coordination: Coordination normal.      Gait: Gait normal.      Deep Tendon Reflexes: Reflexes normal.   Psychiatric:         Mood and Affect: Mood normal.         Behavior: Behavior normal.         Thought Content: Thought content normal.         Judgment: Judgment normal.        Result Review :                 Assessment and Plan    Diagnoses and all orders for this visit:    1. H/O abdominal abscess (Primary)  -     C-reactive protein; Future  -     CBC & Differential; Future    Discussed exam, health problems, reviewed Hospital test, lab, and procedure reports with Pt. Discussed checking labs to monitor Tx.  Discussed F/U plan    Follow Up   Return in about 3 weeks (around 9/21/2022).  Patient was given instructions and counseling regarding his condition or for health maintenance advice. Please see specific information pulled into the AVS if appropriate.         This document has been electronically signed by Buzz Sifuentes MD on August 31, 2022 18:51 CDT

## 2022-09-01 LAB
BASOPHILS # BLD AUTO: 0.09 10*3/MM3 (ref 0–0.2)
BASOPHILS NFR BLD AUTO: 0.9 % (ref 0–1.5)
CRP SERPL-MCNC: <0.3 MG/DL (ref 0–0.5)
DEPRECATED RDW RBC AUTO: 43.2 FL (ref 37–54)
EOSINOPHIL # BLD AUTO: 0.04 10*3/MM3 (ref 0–0.4)
EOSINOPHIL NFR BLD AUTO: 0.4 % (ref 0.3–6.2)
ERYTHROCYTE [DISTWIDTH] IN BLOOD BY AUTOMATED COUNT: 15.5 % (ref 12.3–15.4)
HCT VFR BLD AUTO: 40.1 % (ref 37.5–51)
HGB BLD-MCNC: 13.7 G/DL (ref 13–17.7)
IMM GRANULOCYTES # BLD AUTO: 0.05 10*3/MM3 (ref 0–0.05)
IMM GRANULOCYTES NFR BLD AUTO: 0.5 % (ref 0–0.5)
LYMPHOCYTES # BLD AUTO: 2.37 10*3/MM3 (ref 0.7–3.1)
LYMPHOCYTES NFR BLD AUTO: 23.5 % (ref 19.6–45.3)
MCH RBC QN AUTO: 27.1 PG (ref 26.6–33)
MCHC RBC AUTO-ENTMCNC: 34.2 G/DL (ref 31.5–35.7)
MCV RBC AUTO: 79.2 FL (ref 79–97)
MONOCYTES # BLD AUTO: 0.32 10*3/MM3 (ref 0.1–0.9)
MONOCYTES NFR BLD AUTO: 3.2 % (ref 5–12)
NEUTROPHILS NFR BLD AUTO: 7.2 10*3/MM3 (ref 1.7–7)
NEUTROPHILS NFR BLD AUTO: 71.5 % (ref 42.7–76)
NRBC BLD AUTO-RTO: 0 /100 WBC (ref 0–0.2)
PLATELET # BLD AUTO: 652 10*3/MM3 (ref 140–450)
PMV BLD AUTO: 10.7 FL (ref 6–12)
RBC # BLD AUTO: 5.06 10*6/MM3 (ref 4.14–5.8)
WBC NRBC COR # BLD: 10.07 10*3/MM3 (ref 3.4–10.8)

## 2022-09-02 ENCOUNTER — TELEPHONE (OUTPATIENT)
Dept: FAMILY MEDICINE CLINIC | Facility: CLINIC | Age: 72
End: 2022-09-02

## 2022-09-02 NOTE — TELEPHONE ENCOUNTER
----- Message from Buzz Sifuentes MD sent at 9/2/2022  9:05 AM CDT -----  Labs improving, will go over at next visit.

## 2022-09-06 ENCOUNTER — TELEPHONE (OUTPATIENT)
Dept: FAMILY MEDICINE CLINIC | Facility: CLINIC | Age: 72
End: 2022-09-06

## 2022-09-15 DIAGNOSIS — E78.5 HYPERLIPIDEMIA, UNSPECIFIED HYPERLIPIDEMIA TYPE: ICD-10-CM

## 2022-09-15 RX ORDER — PRAVASTATIN SODIUM 20 MG
20 TABLET ORAL NIGHTLY
Qty: 90 TABLET | Refills: 0 | Status: SHIPPED | OUTPATIENT
Start: 2022-09-15 | End: 2022-11-28 | Stop reason: SDUPTHER

## 2022-09-15 NOTE — TELEPHONE ENCOUNTER
Rx Refill Note  Requested Prescriptions     Pending Prescriptions Disp Refills   • pravastatin (PRAVACHOL) 20 MG tablet 90 tablet 0     Sig: Take 1 tablet by mouth Every Night.      Last office visit with prescribing clinician: 8/31/2022      Next office visit with prescribing clinician: 9/21/2022       {TIP  Please add Last Relevant Lab Date if appropriate  {TIP  Is Refill Pharmacy correct?  Helene Rodríguez MA  09/15/22, 09:17 CDT

## 2022-09-21 ENCOUNTER — OFFICE VISIT (OUTPATIENT)
Dept: FAMILY MEDICINE CLINIC | Facility: CLINIC | Age: 72
End: 2022-09-21

## 2022-09-21 VITALS
SYSTOLIC BLOOD PRESSURE: 130 MMHG | DIASTOLIC BLOOD PRESSURE: 72 MMHG | BODY MASS INDEX: 25.91 KG/M2 | TEMPERATURE: 98.4 F | HEIGHT: 65 IN | OXYGEN SATURATION: 96 % | WEIGHT: 155.5 LBS | HEART RATE: 85 BPM

## 2022-09-21 DIAGNOSIS — K65.1 ABDOMINAL VISCERAL ABSCESS: Primary | Chronic | ICD-10-CM

## 2022-09-21 PROCEDURE — 99213 OFFICE O/P EST LOW 20 MIN: CPT | Performed by: FAMILY MEDICINE

## 2022-09-21 NOTE — PROGRESS NOTES
Chief Complaint  abdominal abscess and Abdominal Pain  ' Drain out abd pain improving'  Subjective          Review of Systems   Constitutional: Negative for fever.   HENT: Positive for hearing loss. Negative for facial swelling, mouth sores and sinus pain.    Eyes: Negative for pain, discharge, itching and visual disturbance.   Respiratory: Negative for chest tightness.    Cardiovascular: Negative for palpitations and leg swelling.   Gastrointestinal: Positive for constipation. Negative for abdominal pain, blood in stool and diarrhea.        Recent probable diverticulitis with abd abscess, S/P percutaneous drain.    Endocrine: Negative.  Negative for cold intolerance and heat intolerance.   Genitourinary: Negative.  Negative for difficulty urinating, dysuria, hematuria and urgency.   Musculoskeletal: Positive for arthralgias and back pain. Negative for myalgias.        L Neck and Back pain stable, Neurontin helps.   Skin: Negative.  Negative for color change, pallor and wound.   Allergic/Immunologic: Positive for environmental allergies. Negative for food allergies and immunocompromised state.   Neurological: Negative for tremors, speech difficulty and headaches.   Hematological: Negative for adenopathy. Does not bruise/bleed easily.   Psychiatric/Behavioral: Negative for agitation, dysphoric mood and sleep disturbance. The patient is not nervous/anxious and is not hyperactive.        Yehuda Rutledge presents to [facility] - [place]  Abdominal Pain  This is a recurrent problem. The current episode started more than 1 month ago. The problem occurs intermittently. The problem has been waxing and waning. The pain is located in the LUQ and periumbilical region. The pain is at a severity of 2/10. The pain is mild. The quality of the pain is cramping. The abdominal pain radiates to the periumbilical region. Associated symptoms include arthralgias and constipation. Pertinent  "negatives include no diarrhea, dysuria, fever, headaches, hematuria or myalgias. The pain is aggravated by eating. Relieved by: Antibiotic, S/P Percutaneous drain. He has tried H2 blockers, proton pump inhibitors, antibiotics and antacids (Abx , Percutaneos drain out , improving) for the symptoms. The treatment provided significant relief. Prior diagnostic workup includes GI consult, lower endoscopy and CT scan (Saw Dr. Collins at [facility], Transferred to [facility]). His past medical history is significant for abdominal surgery. Diverticulitis       Objective   Vital Signs:   /72 (BP Location: Right arm, Patient Position: Sitting, Cuff Size: Adult)   Pulse 85   Temp 98.4 °F (36.9 °C) (Temporal)   Ht 165.1 cm (65\")   Wt 70.5 kg (155 lb 8 oz)   SpO2 96%   BMI 25.88 kg/m²     Physical Exam  Vitals and nursing note reviewed.   Constitutional:       General: He is not in acute distress.     Appearance: He is obese.   HENT:      Head: Atraumatic.      Right Ear: Tympanic membrane, ear canal and external ear normal. There is no impacted cerumen.      Left Ear: Tympanic membrane, ear canal and external ear normal. There is no impacted cerumen.      Nose: No congestion or rhinorrhea.      Mouth/Throat:      Mouth: Mucous membranes are moist.      Pharynx: No oropharyngeal exudate or posterior oropharyngeal erythema.   Eyes:      General:         Right eye: No discharge.         Left eye: No discharge.      Conjunctiva/sclera: Conjunctivae normal.      Pupils: Pupils are equal, round, and reactive to light.   Cardiovascular:      Rate and Rhythm: Normal rate and regular rhythm.      Heart sounds: No murmur heard.    No gallop.   Pulmonary:      Effort: Pulmonary effort is normal.      Breath sounds: No wheezing or rhonchi.   Abdominal:      General: Bowel sounds are normal.      Palpations: Abdomen is soft.      Tenderness: There is no abdominal tenderness. There is no right CVA tenderness, left CVA tenderness, " guarding or rebound.      Comments:        Musculoskeletal:      Cervical back: Normal range of motion. Muscular tenderness present.      Right lower leg: No edema.      Left lower leg: No edema.   Skin:     General: Skin is warm and dry.      Capillary Refill: Capillary refill takes 2 to 3 seconds.      Coloration: Skin is not jaundiced.      Findings: No bruising, erythema or lesion.   Neurological:      General: No focal deficit present.      Mental Status: He is oriented to person, place, and time.      Cranial Nerves: No cranial nerve deficit.      Sensory: No sensory deficit.      Motor: No weakness.      Coordination: Coordination normal.      Gait: Gait normal.      Deep Tendon Reflexes: Reflexes normal.   Psychiatric:         Mood and Affect: Mood normal.         Behavior: Behavior normal.         Thought Content: Thought content normal.         Judgment: Judgment normal.        Result Review :     CMP    CMP 5/2/22 8/20/22   Glucose 112 (A)    BUN 9    Creatinine 0.89    Sodium 138    Potassium 5.6 (A)    Chloride 101    Calcium 10.1    Albumin 4.50    Total Bilirubin 0.3 0.3   Alkaline Phosphatase 108 74   AST (SGOT) 19 28   ALT (SGPT) 13 9 (A)   (A) Abnormal value            CBC w/diff    CBC w/Diff 5/2/22 8/31/22   WBC 11.30 (A) 10.07   RBC 4.80 5.06   Hemoglobin 12.7 (A) 13.7   Hematocrit 38.3 40.1   MCV 79.8 79.2   MCH 26.5 (A) 27.1   MCHC 33.2 34.2   RDW 14.9 15.5 (A)   Platelets 393 652 (A)   Neutrophil Rel % 59.0 71.5   Immature Granulocyte Rel % 0.3 0.5   Lymphocyte Rel % 32.3 23.5   Monocyte Rel % 6.6 3.2 (A)   Eosinophil Rel % 1.3 0.4   Basophil Rel % 0.5 0.9   (A) Abnormal value          CRP 11.7 to <0.30                Assessment and Plan    Diagnoses and all orders for this visit:    1. Abdominal visceral abscess (HCC) (Primary)  Comments:  Improving        Follow Up   Return in about 10 weeks (around 11/30/2022).  Patient was given instructions and counseling regarding his condition or for  health maintenance advice. Please see specific information pulled into the AVS if appropriate.         This document has been electronically signed by Buzz Sifuentes MD on September 21, 2022 15:19 CDT

## 2022-11-01 ENCOUNTER — CLINICAL SUPPORT (OUTPATIENT)
Dept: AUDIOLOGY | Facility: CLINIC | Age: 72
End: 2022-11-01

## 2022-11-01 ENCOUNTER — OFFICE VISIT (OUTPATIENT)
Dept: OTOLARYNGOLOGY | Facility: CLINIC | Age: 72
End: 2022-11-01

## 2022-11-01 VITALS — TEMPERATURE: 98.4 F | WEIGHT: 163 LBS | HEIGHT: 65 IN | BODY MASS INDEX: 27.16 KG/M2

## 2022-11-01 DIAGNOSIS — H90.3 ASYMMETRICAL SENSORINEURAL HEARING LOSS: ICD-10-CM

## 2022-11-01 DIAGNOSIS — H81.02 MENIERE'S DISEASE OF LEFT EAR: Primary | ICD-10-CM

## 2022-11-01 DIAGNOSIS — H90.3 SENSORINEURAL HEARING LOSS, ASYMMETRICAL: Primary | ICD-10-CM

## 2022-11-01 PROCEDURE — 92557 COMPREHENSIVE HEARING TEST: CPT | Performed by: AUDIOLOGIST

## 2022-11-01 PROCEDURE — 92567 TYMPANOMETRY: CPT | Performed by: AUDIOLOGIST

## 2022-11-01 PROCEDURE — 99204 OFFICE O/P NEW MOD 45 MIN: CPT | Performed by: OTOLARYNGOLOGY

## 2022-11-01 RX ORDER — TRIAMTERENE AND HYDROCHLOROTHIAZIDE 37.5; 25 MG/1; MG/1
1 TABLET ORAL DAILY
Qty: 30 TABLET | Refills: 1 | Status: SHIPPED | OUTPATIENT
Start: 2022-11-01 | End: 2022-12-06 | Stop reason: SDUPTHER

## 2022-11-02 NOTE — PROGRESS NOTES
STANDARD AUDIOMETRIC EVALUATION      Name:  Yehuda Rutledge  :  1950  Age:  72 y.o.  Date of Evaluation:  2022      HISTORY    Reason for visit:  Yehuda Rutledge is seen today for a hearing test at the request of Dr. Angel Mendez.  Patient reports his ears feel stopped up.  He states he has never had hearing problems before.       EVALUATION    See Audiogram    RESULTS        Otoscopy and Tympanometry 226 Hz :  Right Ear:  Otoscopy:  Testing completed after ears were examined by the ENT physician          Tympanometry:  Middle ear function within normal limits    Left Ear:   Otoscopy:  Testing completed after ears were examined by the ENT physician        Tympanometry:  Middle ear function within normal limits    Test technique:  Standard Audiometry     Pure Tone Audiometry:   Patient responded to pure tones at 5-45 dB for 250-8000 Hz in right ear, and at 35-60 dB for 250-8000 Hz in left ear.       Speech Audiometry:        Right Ear:  Speech Reception Threshold (SRT) was obtained at 10 dBHL                 Speech Discrimination scores were 92% in quiet when words were presented at 50 dBHL       Left Ear:  Speech Reception Threshold (SRT) was obtained at 30 dBHL                 Speech Discrimination scores were 60% in masking noise when words were presented at  70 dBHL    Reliability:   good    IMPRESSIONS:  1.  Tympanometry results are consistent with Middle ear function within normal limits in both ears.  2.  Pure tone results are consistent with within normal limits to moderate high frequency sensorineural hearing loss for right ear, and mild to moderate   sensorineural hearing loss  in left ear.       RECOMMENDATIONS:  Patient is seeing the Ear Nose and Throat physician immediately following this examination.  It was a pleasure seeing Yehuda Rutledge in Audiology today.  We would be happy to do further testing or discuss these test as necessary.          This document has been electronically  signed by Valencia Andrew MS CCC-A on November 2, 2022 08:16 CDT       Valencia Andrew MS CCC-A  Licensed Audiologist

## 2022-11-03 NOTE — PROGRESS NOTES
Subjective   Yehuda Rutledge is a 72 y.o. male.       History of Present Illness   Patient reported his left ear has felt stopped up.  This been going on for several months.  This will come and go.  Antibiotics sometimes seem to help.  Had a bad dizzy spell at the outset.  No otorrhea.      The following portions of the patient's history were reviewed and updated as appropriate: allergies, current medications, past family history, past medical history, past social history, past surgical history and problem list.     reports that he quit smoking about 4 years ago. His smoking use included cigarettes. He has a 40.00 pack-year smoking history. He has been exposed to tobacco smoke. He has never used smokeless tobacco. He reports that he does not currently use alcohol. He reports current drug use.   Patient is not a tobacco user and has not been counseled for use of tobacco products      Review of Systems        Objective   Physical Exam  Ears: External ears no deformity, canals no discharge, tympanic membranes intact clear and mobile bilaterally.  Nares no discharge or purulence  Oral cavity no masses or lesions  Pharynx: No erythema or exudate  Neck: No adenopathy or mass    Audiogram is obtained and reviewed and shows asymmetrical sensorineural hearing loss with a significant low-frequency component on the left that is not present on the right.  Tympanograms are type a bilaterally.  Discrimination is 92% on the right 60% on the left         Assessment and Plan   Diagnoses and all orders for this visit:    1. Meniere's disease of left ear (Primary)    2. Asymmetrical sensorineural hearing loss    Other orders  -     triamterene-hydrochlorothiazide (MAXZIDE-25) 37.5-25 MG per tablet; Take 1 tablet by mouth Daily.  Dispense: 30 tablet; Refill: 1             Plan: Clinical picture is most consistent with left-sided Ménière's disease.  Advise low-salt diet.  Since he is significantly symptomatic we will also start  triamterene/HCTZ 37.5/25 1 p.o. daily.  Return in a month with an audiogram, call sooner for problems.

## 2022-11-15 ENCOUNTER — TELEPHONE (OUTPATIENT)
Dept: FAMILY MEDICINE CLINIC | Facility: CLINIC | Age: 72
End: 2022-11-15

## 2022-11-15 RX ORDER — DEXTROMETHORPHAN HYDROBROMIDE AND PROMETHAZINE HYDROCHLORIDE 15; 6.25 MG/5ML; MG/5ML
5 SYRUP ORAL 4 TIMES DAILY PRN
Qty: 118 ML | Refills: 1 | Status: SHIPPED | OUTPATIENT
Start: 2022-11-15 | End: 2022-11-28

## 2022-11-15 RX ORDER — AZITHROMYCIN 250 MG/1
TABLET, FILM COATED ORAL
Qty: 6 TABLET | Refills: 0 | Status: SHIPPED | OUTPATIENT
Start: 2022-11-15 | End: 2022-11-28

## 2022-11-15 NOTE — TELEPHONE ENCOUNTER
Patient would like to know if Bear Bailey could call something in for him. States he has a cough, runny nose, and is achey at times. Pharmacy is [facility]. You can call him back at either number in his chart [phone]. Or [phone] states the it feels like he has a fur ball in his throat

## 2022-11-28 ENCOUNTER — OFFICE VISIT (OUTPATIENT)
Dept: FAMILY MEDICINE CLINIC | Facility: CLINIC | Age: 72
End: 2022-11-28

## 2022-11-28 VITALS
HEIGHT: 65 IN | DIASTOLIC BLOOD PRESSURE: 70 MMHG | OXYGEN SATURATION: 99 % | TEMPERATURE: 97.5 F | HEART RATE: 83 BPM | WEIGHT: 166.4 LBS | BODY MASS INDEX: 27.72 KG/M2 | SYSTOLIC BLOOD PRESSURE: 126 MMHG

## 2022-11-28 DIAGNOSIS — Z23 NEED FOR IMMUNIZATION AGAINST INFLUENZA: ICD-10-CM

## 2022-11-28 DIAGNOSIS — G89.29 CHRONIC BILATERAL LOW BACK PAIN WITH BILATERAL SCIATICA: Chronic | ICD-10-CM

## 2022-11-28 DIAGNOSIS — M54.12 CHRONIC CERVICAL RADICULOPATHY: Chronic | ICD-10-CM

## 2022-11-28 DIAGNOSIS — R09.81 SINUS CONGESTION: ICD-10-CM

## 2022-11-28 DIAGNOSIS — E78.5 HYPERLIPIDEMIA, UNSPECIFIED HYPERLIPIDEMIA TYPE: ICD-10-CM

## 2022-11-28 DIAGNOSIS — J22 LOWER RESPIRATORY INFECTION (E.G., BRONCHITIS, PNEUMONIA, PNEUMONITIS, PULMONITIS): ICD-10-CM

## 2022-11-28 DIAGNOSIS — M54.42 CHRONIC BILATERAL LOW BACK PAIN WITH BILATERAL SCIATICA: Chronic | ICD-10-CM

## 2022-11-28 DIAGNOSIS — M54.41 CHRONIC BILATERAL LOW BACK PAIN WITH BILATERAL SCIATICA: Chronic | ICD-10-CM

## 2022-11-28 PROCEDURE — G0008 ADMIN INFLUENZA VIRUS VAC: HCPCS | Performed by: FAMILY MEDICINE

## 2022-11-28 PROCEDURE — 90662 IIV NO PRSV INCREASED AG IM: CPT | Performed by: FAMILY MEDICINE

## 2022-11-28 PROCEDURE — 99214 OFFICE O/P EST MOD 30 MIN: CPT | Performed by: FAMILY MEDICINE

## 2022-11-28 RX ORDER — DEXTROMETHORPHAN HYDROBROMIDE AND PROMETHAZINE HYDROCHLORIDE 15; 6.25 MG/5ML; MG/5ML
5 SYRUP ORAL 4 TIMES DAILY PRN
Qty: 118 ML | Refills: 1 | Status: SHIPPED | OUTPATIENT
Start: 2022-11-28 | End: 2023-03-01 | Stop reason: SDUPTHER

## 2022-11-28 RX ORDER — GABAPENTIN 100 MG/1
100 CAPSULE ORAL 2 TIMES DAILY
Qty: 60 CAPSULE | Refills: 3 | Status: SHIPPED | OUTPATIENT
Start: 2022-11-28 | End: 2023-03-01 | Stop reason: SDUPTHER

## 2022-11-28 RX ORDER — IPRATROPIUM BROMIDE AND ALBUTEROL SULFATE 2.5; .5 MG/3ML; MG/3ML
3 SOLUTION RESPIRATORY (INHALATION) EVERY 4 HOURS PRN
Qty: 360 ML | Refills: 2 | Status: SHIPPED | OUTPATIENT
Start: 2022-11-28 | End: 2023-03-01 | Stop reason: SDUPTHER

## 2022-11-28 RX ORDER — PRAVASTATIN SODIUM 20 MG
20 TABLET ORAL NIGHTLY
Qty: 90 TABLET | Refills: 1 | Status: SHIPPED | OUTPATIENT
Start: 2022-11-28 | End: 2023-03-01 | Stop reason: SDUPTHER

## 2022-11-28 RX ORDER — PRAVASTATIN SODIUM 20 MG
20 TABLET ORAL NIGHTLY
Qty: 90 TABLET | Refills: 1 | Status: CANCELLED | OUTPATIENT
Start: 2022-11-28

## 2022-11-28 RX ORDER — CETIRIZINE HYDROCHLORIDE, PSEUDOEPHEDRINE HYDROCHLORIDE 5; 120 MG/1; MG/1
1 TABLET, FILM COATED, EXTENDED RELEASE ORAL 2 TIMES DAILY
Qty: 60 TABLET | Refills: 1 | Status: SHIPPED | OUTPATIENT
Start: 2022-11-28 | End: 2023-03-01 | Stop reason: SDUPTHER

## 2022-11-28 NOTE — PROGRESS NOTES
Injection  Injection performed in right deltoid by Helene Rodríguez MA. Patient tolerated the procedure well without complications.  11/28/22   Helene Rodríguez MA

## 2022-11-28 NOTE — PROGRESS NOTES
Chief Complaint  abdominal abscess, Cough, Chest congestion, Belching, and Dizziness  ' Improving after having drain out, abdominal pain has resolved. Have chest congestion, bronchitis. Seeing ENT for episodes of dizziness'.  Subjective          Review of Systems   Constitutional: Negative for fever.   HENT: Positive for congestion. Negative for facial swelling, mouth sores and sinus pain.         Seeing ENT, Meniere's suspected.    Eyes: Negative for pain, discharge, itching and visual disturbance.   Respiratory: Positive for shortness of breath. Negative for chest tightness.    Cardiovascular: Negative for palpitations and leg swelling.   Gastrointestinal: Positive for constipation. Negative for blood in stool.        Recent probable diverticulitis with abd abscess, S/P percutaneous drain, improved   Endocrine: Negative.  Negative for cold intolerance and heat intolerance.   Genitourinary: Negative.  Negative for difficulty urinating, dysuria, hematuria and urgency.   Musculoskeletal: Positive for arthralgias and back pain. Negative for myalgias.        L Neck and Back pain stable, Neurontin helps.   Skin: Negative.  Negative for color change, pallor and wound.   Allergic/Immunologic: Positive for environmental allergies. Negative for food allergies and immunocompromised state.   Neurological: Positive for dizziness. Negative for tremors and speech difficulty.   Hematological: Negative for adenopathy. Does not bruise/bleed easily.   Psychiatric/Behavioral: Negative for agitation, dysphoric mood and sleep disturbance. The patient is not nervous/anxious and is not hyperactive.        Yehuda Rutledge presents to [facility] - [place]  History of Present Illness  Dizziness, currently improved , taking Diuretic, on low salt diet.   Abdominal Pain  Chronicity: H/O Abd abscess. The current episode started more than 1 month ago. The problem has been resolved. The pain is  located in the LUQ and periumbilical region. The pain is at a severity of 0/10. The patient is experiencing no pain. Associated symptoms include arthralgias and constipation. Pertinent negatives include no dysuria, fever, hematuria or myalgias. Nothing aggravates the pain. Relieved by: Antibiotic, S/P Percutaneous drain. He has tried H2 blockers, proton pump inhibitors, antibiotics and antacids (Abx , Percutaneos drain out , improved) for the symptoms. The treatment provided significant relief. Prior diagnostic workup includes GI consult, lower endoscopy and CT scan (Saw Dr. Collins at [facility], Transferred to [facility]). His past medical history is significant for abdominal surgery.   Cough  This is a new problem. The current episode started 1 to 4 weeks ago. The problem has been gradually worsening. The cough is productive of sputum. Associated symptoms include shortness of breath. Pertinent negatives include no fever or myalgias. Associated symptoms comments: Cough , congestion. He has tried OTC cough suppressant (Duo-Nebs) for the symptoms. The treatment provided mild relief. His past medical history is significant for bronchitis and environmental allergies.   Back Pain  This is a chronic problem. The current episode started more than 1 year ago. The problem occurs daily. The problem has been waxing and waning since onset. The pain is present in the lumbar spine. The pain is at a severity of 5/10. The pain is moderate. Pertinent negatives include no dysuria or fever. He has tried analgesics, NSAIDs, muscle relaxant and bed rest (Neurontin) for the symptoms. The treatment provided moderate relief.   Neck Pain   This is a chronic problem. The current episode started more than 1 year ago. The problem occurs daily. The problem has been waxing and waning. The pain is at a severity of 4/10. The pain is moderate. Pertinent negatives include no fever. He has tried acetaminophen, NSAIDs, home exercises, chiropractic  "manipulation and muscle relaxants (Neurontin) for the symptoms. The treatment provided mild relief.       Objective   Vital Signs:   /70 (BP Location: Right arm, Patient Position: Sitting, Cuff Size: Adult)   Pulse 83   Temp 97.5 °F (36.4 °C) (Temporal)   Ht 165.1 cm (65\")   Wt 75.5 kg (166 lb 6.4 oz)   SpO2 99%   BMI 27.69 kg/m²     Physical Exam  Vitals and nursing note reviewed.   Constitutional:       General: He is not in acute distress.     Appearance: He is obese.   HENT:      Head: Atraumatic.      Right Ear: Tympanic membrane, ear canal and external ear normal. There is no impacted cerumen.      Left Ear: Tympanic membrane, ear canal and external ear normal. There is no impacted cerumen.      Nose: No congestion or rhinorrhea.      Mouth/Throat:      Mouth: Mucous membranes are moist.      Pharynx: No oropharyngeal exudate or posterior oropharyngeal erythema.   Eyes:      General:         Right eye: No discharge.         Left eye: No discharge.      Conjunctiva/sclera: Conjunctivae normal.      Pupils: Pupils are equal, round, and reactive to light.   Cardiovascular:      Rate and Rhythm: Normal rate and regular rhythm.      Heart sounds: No murmur heard.    No gallop.   Pulmonary:      Effort: Pulmonary effort is normal.      Breath sounds: Rhonchi present. No wheezing.      Comments: Coughing  Abdominal:      General: Bowel sounds are normal.      Palpations: Abdomen is soft.      Tenderness: There is no abdominal tenderness. There is no right CVA tenderness, left CVA tenderness, guarding or rebound.      Comments:        Musculoskeletal:      Cervical back: Normal range of motion. Muscular tenderness present.      Right lower leg: No edema.      Left lower leg: No edema.   Skin:     General: Skin is warm and dry.      Capillary Refill: Capillary refill takes 2 to 3 seconds.      Coloration: Skin is not jaundiced.      Findings: No bruising, erythema or lesion.   Neurological:      General: No " focal deficit present.      Mental Status: He is oriented to person, place, and time.      Cranial Nerves: No cranial nerve deficit.      Sensory: No sensory deficit.      Motor: No weakness.      Coordination: Coordination normal.      Gait: Gait normal.      Deep Tendon Reflexes: Reflexes normal.   Psychiatric:         Mood and Affect: Mood normal.         Behavior: Behavior normal.         Thought Content: Thought content normal.         Judgment: Judgment normal.        Result Review :                 Assessment and Plan    Diagnoses and all orders for this visit:    1. Chronic cervical radiculopathy  -     gabapentin (NEURONTIN) 100 MG capsule; Take 1 capsule by mouth 2 (Two) Times a Day.  Dispense: 60 capsule; Refill: 3    2. Chronic bilateral low back pain with bilateral sciatica  -     gabapentin (NEURONTIN) 100 MG capsule; Take 1 capsule by mouth 2 (Two) Times a Day.  Dispense: 60 capsule; Refill: 3    3. Sinus congestion  -     cetirizine-pseudoephedrine (ZyrTEC-D) 5-120 MG per 12 hr tablet; Take 1 tablet by mouth 2 (Two) Times a Day.  Dispense: 60 tablet; Refill: 1    4. Hyperlipidemia, unspecified hyperlipidemia type  -     pravastatin (PRAVACHOL) 20 MG tablet; Take 1 tablet by mouth Every Night.  Dispense: 90 tablet; Refill: 1    5. Need for immunization against influenza  -     Fluzone High-Dose 65+yrs ([id number])    6. Lower respiratory infection (e.g., bronchitis, pneumonia, pneumonitis, pulmonitis)  -     promethazine-dextromethorphan (PROMETHAZINE-DM) 6.25-15 MG/5ML syrup; Take 5 mL by mouth 4 (Four) Times a Day As Needed for Cough.  Dispense: 118 mL; Refill: 1  -     ipratropium-albuterol (DUO-NEB) 0.5-2.5 mg/3 ml nebulizer; Take 3 mL by nebulization Every 4 (Four) Hours As Needed for Wheezing.  Dispense: 360 mL; Refill: 2        Follow Up   Return in about 3 months (around 2/28/2023).  Patient was given instructions and counseling regarding his condition or for health maintenance advice. Please  see specific information pulled into the AVS if appropriate.         This document has been electronically signed by Buzz Sifuentes MD on November 28, 2022 19:15 CST

## 2022-12-06 ENCOUNTER — OFFICE VISIT (OUTPATIENT)
Dept: OTOLARYNGOLOGY | Facility: CLINIC | Age: 72
End: 2022-12-06

## 2022-12-06 ENCOUNTER — CLINICAL SUPPORT (OUTPATIENT)
Dept: AUDIOLOGY | Facility: CLINIC | Age: 72
End: 2022-12-06

## 2022-12-06 ENCOUNTER — LAB (OUTPATIENT)
Dept: LAB | Facility: HOSPITAL | Age: 72
End: 2022-12-06

## 2022-12-06 VITALS — WEIGHT: 169.6 LBS | BODY MASS INDEX: 28.26 KG/M2 | TEMPERATURE: 98 F | HEIGHT: 65 IN

## 2022-12-06 DIAGNOSIS — H81.02 MENIERE'S DISEASE OF LEFT EAR: ICD-10-CM

## 2022-12-06 DIAGNOSIS — H93.12 TINNITUS OF LEFT EAR: ICD-10-CM

## 2022-12-06 DIAGNOSIS — H90.3 SENSORINEURAL HEARING LOSS, ASYMMETRICAL: Primary | ICD-10-CM

## 2022-12-06 DIAGNOSIS — H81.02 MENIERE'S DISEASE OF LEFT EAR: Primary | ICD-10-CM

## 2022-12-06 DIAGNOSIS — H81.02 MENIERE'S DISEASE, LEFT EAR: ICD-10-CM

## 2022-12-06 PROCEDURE — 99214 OFFICE O/P EST MOD 30 MIN: CPT | Performed by: OTOLARYNGOLOGY

## 2022-12-06 PROCEDURE — 80048 BASIC METABOLIC PNL TOTAL CA: CPT

## 2022-12-06 PROCEDURE — 92567 TYMPANOMETRY: CPT | Performed by: AUDIOLOGIST

## 2022-12-06 PROCEDURE — 92557 COMPREHENSIVE HEARING TEST: CPT | Performed by: AUDIOLOGIST

## 2022-12-06 RX ORDER — TRIAMTERENE AND HYDROCHLOROTHIAZIDE 37.5; 25 MG/1; MG/1
1 TABLET ORAL DAILY
Qty: 30 TABLET | Refills: 6 | Status: SHIPPED | OUTPATIENT
Start: 2022-12-06 | End: 2023-03-23 | Stop reason: SDUPTHER

## 2022-12-06 NOTE — PROGRESS NOTES
STANDARD AUDIOMETRIC EVALUATION      Name:  Yehuda Rutledge  :  1950  Age:  72 y.o.  Date of Evaluation:  2022      HISTORY    Reason for visit:  Yehuda Rutledge is seen today for a 1 month follow up hearing test at the request of Dr. Angel Mendez.  Patient reportedly has Meniere's Disease in his left ear.  A previous audiogram shows an asymmetrical sensorineural hearing loss, worse In the left ear. Today he states his ears are a little better.  He states his hearing is still decreased in his left ear, and his left ear still buzzes.  He states he has problems hearing in background noise.      EVALUATION    See Audiogram    RESULTS        Otoscopy and Tympanometry 226 Hz :  Right Ear:  Otoscopy:  Clear ear canal          Tympanometry:  Unable to test due to no seal    Left Ear:   Otoscopy:  Clear ear canal        Tympanometry:  Middle ear function within normal limits    Test technique:  Standard Audiometry     Pure Tone Audiometry:   Patient responded to pure tones at 10-50 dB for 250-8000 Hz in right ear, and at 30-60 dB for 250-8000 Hz in left ear.       Speech Audiometry:        Right Ear:  Speech Reception Threshold (SRT) was obtained at 10 dBHL                 Speech Discrimination scores were 96% in quiet when words were presented at 50 dBHL       Left Ear:  Speech Reception Threshold (SRT) was obtained at 40 dBHL                 Speech Discrimination scores were 80% in masking noise when words were presented at  80 dBHL    Reliability:   good    IMPRESSIONS:  1.  Tympanometry results are consistent with Unable to test due to no seal in right ear, and Middle ear function within normal limits in left ear.  2.  Pure tone results are consistent with within normal limits to moderate sloping, high frequency sensorineural hearing loss for right ear, and mild to moderate reverse cookie bite sensorineural hearing loss  in left ear.       RECOMMENDATIONS:  Patient is seeing the Ear Nose and Throat  physician immediately following this examination.  It was a pleasure seeing Yehuda Rutledge in Audiology today.  We would be happy to do further testing or discuss these test as necessary.          This document has been electronically signed by Valencia Andrew MS CCC-A on December 6, 2022 15:52 CST       Valencia Andrew MS CCC-A  Licensed Audiologist

## 2022-12-06 NOTE — PROGRESS NOTES
Subjective   Yehuda Rutledge is a 72 y.o. male.       History of Present Illness   Patient was seen previously with a left-sided low-frequency sensorineural hearing loss fullness and dizziness initially.  This was thought to be clinically consistent with Ménière's disease.  Has been given triamterene/HCTZ 37.5/25 along with instructions for low-salt diet.  Reports he feels like his ear is improved although not back to normal.  No significant dizziness today.      The following portions of the patient's history were reviewed and updated as appropriate: allergies, current medications, past family history, past medical history, past social history, past surgical history and problem list.     reports that he quit smoking about 5 years ago. His smoking use included cigarettes. He has a 40.00 pack-year smoking history. He has been exposed to tobacco smoke. He has never used smokeless tobacco. He reports that he does not currently use alcohol. He reports current drug use.   Patient is not a tobacco user and has not been counseled for use of tobacco products      Review of Systems        Objective   Physical Exam  Ears: External ears no deformity, canals no discharge, tympanic membranes intact clear and mobile bilaterally.  Audiogram is obtained and reviewed.  This still shows a right-sided high-frequency sensorineural hearing loss.  Left ear now shows mild to moderate sensorineural loss with definite improvement in the low frequencies compared to last month's hearing test.  Discrimination is 96% on the right 80% on the left.  Previous discrimination had been 60% on the left.         Assessment and Plan   Diagnoses and all orders for this visit:    1. Meniere's disease of left ear (Primary)  -     Basic Metabolic Panel; Future    Other orders  -     triamterene-hydrochlorothiazide (MAXZIDE-25) 37.5-25 MG per tablet; Take 1 tablet by mouth Daily.  Dispense: 30 tablet; Refill: 6             Plan: Objective improvement and  audiometric findings confirms the diagnosis of left-sided Ménière's disease.  Continue low-salt diet and refill triamterene/HCTZ 37.5/25 daily.  Will check basic metabolic panel today.  If symptoms remain controlled return in 6 months with repeat audiogram    Addendum: Patient's basic metabolic panel showed normal BUN, creatinine, potassium and sodium.  Continue current medical regimen as planned.

## 2022-12-07 LAB
ANION GAP SERPL CALCULATED.3IONS-SCNC: 9 MMOL/L (ref 5–15)
BUN SERPL-MCNC: 18 MG/DL (ref 8–23)
BUN/CREAT SERPL: 14.3 (ref 7–25)
CALCIUM SPEC-SCNC: 9.9 MG/DL (ref 8.6–10.5)
CHLORIDE SERPL-SCNC: 102 MMOL/L (ref 98–107)
CO2 SERPL-SCNC: 25 MMOL/L (ref 22–29)
CREAT SERPL-MCNC: 1.26 MG/DL (ref 0.76–1.27)
EGFRCR SERPLBLD CKD-EPI 2021: 61.4 ML/MIN/1.73
GLUCOSE SERPL-MCNC: 89 MG/DL (ref 65–99)
POTASSIUM SERPL-SCNC: 4.8 MMOL/L (ref 3.5–5.2)
SODIUM SERPL-SCNC: 136 MMOL/L (ref 136–145)

## 2023-02-06 ENCOUNTER — TELEPHONE (OUTPATIENT)
Dept: FAMILY MEDICINE CLINIC | Facility: CLINIC | Age: 73
End: 2023-02-06
Payer: MEDICARE

## 2023-02-06 RX ORDER — AZITHROMYCIN 250 MG/1
TABLET, FILM COATED ORAL
Qty: 6 TABLET | Refills: 0 | Status: SHIPPED | OUTPATIENT
Start: 2023-02-06 | End: 2023-03-01

## 2023-02-06 NOTE — TELEPHONE ENCOUNTER
Patient called stating that for the last 2 weeks he has had head congestion;cough;low grade fever and has been taking OTC but is not helping would like to have something sent to [facility].

## 2023-03-01 ENCOUNTER — OFFICE VISIT (OUTPATIENT)
Dept: FAMILY MEDICINE CLINIC | Facility: CLINIC | Age: 73
End: 2023-03-01
Payer: MEDICARE

## 2023-03-01 VITALS
HEIGHT: 65 IN | HEART RATE: 95 BPM | SYSTOLIC BLOOD PRESSURE: 134 MMHG | TEMPERATURE: 98 F | BODY MASS INDEX: 28.86 KG/M2 | WEIGHT: 173.2 LBS | DIASTOLIC BLOOD PRESSURE: 76 MMHG | OXYGEN SATURATION: 96 %

## 2023-03-01 DIAGNOSIS — M54.12 CHRONIC CERVICAL RADICULOPATHY: Chronic | ICD-10-CM

## 2023-03-01 DIAGNOSIS — M54.41 CHRONIC BILATERAL LOW BACK PAIN WITH BILATERAL SCIATICA: Chronic | ICD-10-CM

## 2023-03-01 DIAGNOSIS — G89.29 CHRONIC BILATERAL LOW BACK PAIN WITH BILATERAL SCIATICA: Chronic | ICD-10-CM

## 2023-03-01 DIAGNOSIS — J41.0 SIMPLE CHRONIC BRONCHITIS: ICD-10-CM

## 2023-03-01 DIAGNOSIS — M54.42 CHRONIC BILATERAL LOW BACK PAIN WITH BILATERAL SCIATICA: Chronic | ICD-10-CM

## 2023-03-01 DIAGNOSIS — E78.5 HYPERLIPIDEMIA, UNSPECIFIED HYPERLIPIDEMIA TYPE: Chronic | ICD-10-CM

## 2023-03-01 DIAGNOSIS — R09.81 SINUS CONGESTION: Chronic | ICD-10-CM

## 2023-03-01 PROCEDURE — 99214 OFFICE O/P EST MOD 30 MIN: CPT | Performed by: FAMILY MEDICINE

## 2023-03-01 RX ORDER — ACETYLCYSTEINE 600 MG
600 CAPSULE ORAL 2 TIMES DAILY
Qty: 60 CAPSULE | Refills: 4 | Status: SHIPPED | OUTPATIENT
Start: 2023-03-01

## 2023-03-01 RX ORDER — PRAVASTATIN SODIUM 20 MG
20 TABLET ORAL NIGHTLY
Qty: 90 TABLET | Refills: 1 | Status: SHIPPED | OUTPATIENT
Start: 2023-03-01

## 2023-03-01 RX ORDER — IPRATROPIUM BROMIDE AND ALBUTEROL SULFATE 2.5; .5 MG/3ML; MG/3ML
3 SOLUTION RESPIRATORY (INHALATION) EVERY 4 HOURS PRN
Qty: 360 ML | Refills: 2 | Status: SHIPPED | OUTPATIENT
Start: 2023-03-01

## 2023-03-01 RX ORDER — DEXTROMETHORPHAN HYDROBROMIDE AND PROMETHAZINE HYDROCHLORIDE 15; 6.25 MG/5ML; MG/5ML
5 SYRUP ORAL 4 TIMES DAILY PRN
Qty: 118 ML | Refills: 1 | Status: SHIPPED | OUTPATIENT
Start: 2023-03-01

## 2023-03-01 RX ORDER — CETIRIZINE HYDROCHLORIDE, PSEUDOEPHEDRINE HYDROCHLORIDE 5; 120 MG/1; MG/1
1 TABLET, FILM COATED, EXTENDED RELEASE ORAL 2 TIMES DAILY
Qty: 60 TABLET | Refills: 1 | Status: SHIPPED | OUTPATIENT
Start: 2023-03-01

## 2023-03-01 RX ORDER — GABAPENTIN 100 MG/1
100 CAPSULE ORAL 2 TIMES DAILY
Qty: 60 CAPSULE | Refills: 3 | Status: SHIPPED | OUTPATIENT
Start: 2023-03-01

## 2023-03-01 RX ORDER — ALBUTEROL SULFATE 90 UG/1
2 AEROSOL, METERED RESPIRATORY (INHALATION) EVERY 4 HOURS PRN
Qty: 18 G | Refills: 2 | Status: SHIPPED | OUTPATIENT
Start: 2023-03-01

## 2023-03-01 NOTE — PROGRESS NOTES
Chief Complaint  COPD, Arthritis, Hyperlipidemia, Back Pain, Neck Pain, Insomnia, Cough, Sinusitis, and Skin Problem (Area on back would like checked, dry, crusty/)    Subjective          Review of Systems   Constitutional: Negative for chills, fatigue and fever.   HENT: Positive for congestion, ear pain, postnasal drip and sinus pressure. Negative for facial swelling, mouth sores, sinus pain and sore throat.         Seeing ENT, Meniere's suspected.    Eyes: Negative for pain, discharge, itching and visual disturbance.   Respiratory: Positive for cough, sputum production, shortness of breath and wheezing. Negative for chest tightness.    Cardiovascular: Negative for chest pain, palpitations and leg swelling.   Gastrointestinal: Positive for abdominal pain and constipation. Negative for blood in stool.        Recent probable diverticulitis with abd abscess, S/P percutaneous drain, improved   Endocrine: Negative.  Negative for cold intolerance and heat intolerance.   Genitourinary: Negative.  Negative for difficulty urinating, dysuria, hematuria and urgency.   Musculoskeletal: Positive for arthralgias, arthritis, back pain, joint swelling, neck pain and stiffness. Negative for myalgias.        L Neck and Back pain stable, Neurontin helps.   Skin: Negative.  Negative for color change, pallor and wound. Rash: ear.   Allergic/Immunologic: Positive for environmental allergies. Negative for food allergies and immunocompromised state.   Neurological: Positive for dizziness and headaches. Negative for tremors and speech difficulty.   Hematological: Negative for adenopathy. Does not bruise/bleed easily.   Psychiatric/Behavioral: Negative for agitation, dysphoric mood and sleep disturbance. The patient has insomnia. The patient is not nervous/anxious and is not hyperactive.        Yehuda Rutledge presents to [facility] - [place]  COPD  He complains of cough, shortness of breath,  sputum production and wheezing. This is a chronic problem. The current episode started more than 1 year ago. The problem occurs daily. The problem has been waxing and waning. The cough is productive of sputum. Associated symptoms include ear pain, headaches and postnasal drip. Pertinent negatives include no chest pain, fever, myalgias or sore throat. His symptoms are aggravated by any activity. His symptoms are alleviated by beta-agonist. He reports moderate improvement on treatment. His past medical history is significant for COPD.   Arthritis  Presents for follow-up visit. He complains of stiffness, joint swelling and joint warmth. The symptoms have been improving. Affected location: Multiple joints. His pain is at a severity of 5/10. Pertinent negatives include no dysuria, fatigue or fever. Rash: ear.   Hyperlipidemia  This is a chronic problem. The current episode started more than 1 year ago. Recent lipid tests were reviewed and are variable. Associated symptoms include shortness of breath. Pertinent negatives include no chest pain or myalgias. The current treatment provides moderate improvement of lipids. Risk factors for coronary artery disease include hypertension, male sex, obesity and dyslipidemia.   Back Pain  This is a chronic problem. The current episode started more than 1 year ago. The problem occurs daily. The problem has been waxing and waning since onset. The pain is at a severity of 4/10. The pain is moderate. Associated symptoms include abdominal pain and headaches. Pertinent negatives include no chest pain, dysuria or fever. He has tried NSAIDs, muscle relaxant, home exercises, bed rest and analgesics for the symptoms. The treatment provided mild relief.   Neck Pain   This is a chronic problem. The current episode started more than 1 year ago. The problem occurs daily. The problem has been waxing and waning. The pain is at a severity of 4/10. The pain is moderate. Associated symptoms include  headaches. Pertinent negatives include no chest pain or fever. He has tried acetaminophen, NSAIDs, home exercises, chiropractic manipulation and muscle relaxants (Neurontin) for the symptoms. The treatment provided mild relief.   Insomnia  This is a chronic problem. Associated symptoms include abdominal pain, arthralgias, congestion, coughing, headaches, joint swelling and neck pain. Pertinent negatives include no chest pain, chills, fatigue, fever, myalgias or sore throat. Rash: ear. The symptoms are aggravated by stress (Pain). He has tried sleep and rest (Elavil. Trazadone, OTC meds) for the symptoms. The treatment provided moderate relief.   Cough  This is a chronic problem. The current episode started more than 1 year ago. Associated symptoms include ear pain, headaches, postnasal drip, shortness of breath and wheezing. Pertinent negatives include no chest pain, chills, fever, myalgias or sore throat. Rash: ear. He has tried a beta-agonist inhaler and prescription cough suppressant for the symptoms. The treatment provided mild relief. His past medical history is significant for COPD and environmental allergies.   Sinusitis  This is a recurrent problem. The current episode started more than 1 month ago. The problem has been gradually improving since onset. There has been no fever. His pain is at a severity of 4/10. The pain is moderate. Associated symptoms include congestion, coughing, ear pain, headaches, neck pain, shortness of breath and sinus pressure. Pertinent negatives include no chills or sore throat. Past treatments include acetaminophen, oral decongestants, saline sprays and antibiotics (Z-pack called in). The treatment provided moderate relief.   Skin Problem  This is a chronic problem. Associated symptoms include abdominal pain, arthralgias, congestion, coughing, headaches, joint swelling and neck pain. Pertinent negatives include no chest pain, chills, fatigue, fever, myalgias or sore throat. Rash:  "ear. Treatments tried: Keratotic lesions, will schedule to have removed.    Ear Fullness   There is pain in both ears. This is a chronic problem. The current episode started 1 to 4 weeks ago. The problem has been waxing and waning. Associated symptoms include abdominal pain, coughing, headaches and neck pain. Pertinent negatives include no sore throat. Rash: ear. He has tried acetaminophen and antibiotics for the symptoms. The treatment provided mild (Followed by ENT) relief.       Objective   Vital Signs:   /76 (BP Location: Left arm, Patient Position: Sitting, Cuff Size: Adult)   Pulse 95   Temp 98 °F (36.7 °C) (Temporal)   Ht 165.1 cm (65\")   Wt 78.6 kg (173 lb 3.2 oz)   SpO2 96%   BMI 28.82 kg/m²     Physical Exam  Vitals and nursing note reviewed.   Constitutional:       General: He is not in acute distress.  HENT:      Head: Atraumatic.      Right Ear: Tympanic membrane, ear canal and external ear normal. There is no impacted cerumen.      Left Ear: Tympanic membrane, ear canal and external ear normal. There is no impacted cerumen.      Nose: No congestion or rhinorrhea.      Mouth/Throat:      Mouth: Mucous membranes are moist.      Pharynx: No oropharyngeal exudate or posterior oropharyngeal erythema.   Eyes:      General: No scleral icterus.        Right eye: No discharge.         Left eye: No discharge.      Conjunctiva/sclera: Conjunctivae normal.      Pupils: Pupils are equal, round, and reactive to light.   Cardiovascular:      Rate and Rhythm: Normal rate and regular rhythm.      Heart sounds: No murmur heard.    No gallop.   Pulmonary:      Effort: Pulmonary effort is normal.      Breath sounds: No wheezing or rhonchi.   Abdominal:      General: Bowel sounds are normal. There is no distension.      Palpations: Abdomen is soft. There is no mass.      Tenderness: There is no abdominal tenderness. There is no right CVA tenderness, left CVA tenderness, guarding or rebound.      Hernia: No " hernia is present.      Comments:        Musculoskeletal:      Cervical back: Normal range of motion. Muscular tenderness present.      Right lower leg: No edema.      Left lower leg: No edema.   Skin:     General: Skin is warm and dry.      Capillary Refill: Capillary refill takes 2 to 3 seconds.      Coloration: Skin is not jaundiced.      Findings: No bruising, erythema or lesion.   Neurological:      General: No focal deficit present.      Mental Status: He is oriented to person, place, and time.      Cranial Nerves: No cranial nerve deficit.      Sensory: No sensory deficit.      Motor: No weakness.      Coordination: Coordination normal.      Gait: Gait normal.      Deep Tendon Reflexes: Reflexes normal.   Psychiatric:         Mood and Affect: Mood normal.         Behavior: Behavior normal.         Thought Content: Thought content normal.         Judgment: Judgment normal.        Result Review :                 Assessment and Plan    Diagnoses and all orders for this visit:    1. Chronic cervical radiculopathy  -     gabapentin (NEURONTIN) 100 MG capsule; Take 1 capsule by mouth 2 (Two) Times a Day.  Dispense: 60 capsule; Refill: 3    2. Chronic bilateral low back pain with bilateral sciatica  -     gabapentin (NEURONTIN) 100 MG capsule; Take 1 capsule by mouth 2 (Two) Times a Day.  Dispense: 60 capsule; Refill: 3    3. Sinus congestion  -     cetirizine-pseudoephedrine (ZyrTEC-D) 5-120 MG per 12 hr tablet; Take 1 tablet by mouth 2 (Two) Times a Day.  Dispense: 60 tablet; Refill: 1    4. Simple chronic bronchitis (HCC)  -     ipratropium-albuterol (DUO-NEB) 0.5-2.5 mg/3 ml nebulizer; Take 3 mL by nebulization Every 4 (Four) Hours As Needed for Wheezing.  Dispense: 360 mL; Refill: 2  -     albuterol sulfate  (90 Base) MCG/ACT inhaler; Inhale 2 puffs Every 4 (Four) Hours As Needed for Shortness of Air or Wheezing. 2 puffs by  Mouth every 4-6 hours  Dispense: 18 g; Refill: 2  -      promethazine-dextromethorphan (PROMETHAZINE-DM) 6.25-15 MG/5ML syrup; Take 5 mL by mouth 4 (Four) Times a Day As Needed for Cough.  Dispense: 118 mL; Refill: 1  -     Acetylcysteine (NAC) capsule capsule; Take 1 capsule by mouth 2 (Two) Times a Day. For respiratory symptoms from COPD.  Dispense: 60 capsule; Refill: 4    5. Hyperlipidemia, unspecified hyperlipidemia type  -     pravastatin (PRAVACHOL) 20 MG tablet; Take 1 tablet by mouth Every Night.  Dispense: 90 tablet; Refill: 1        Follow Up   Return in about 5 months (around 8/1/2023).  Patient was given instructions and counseling regarding his condition or for health maintenance advice. Please see specific information pulled into the AVS if appropriate.         This document has been electronically signed by Buzz Sifuentes MD on March 1, 2023 13:03 CST

## 2023-03-23 RX ORDER — TRIAMTERENE AND HYDROCHLOROTHIAZIDE 37.5; 25 MG/1; MG/1
1 TABLET ORAL DAILY
Qty: 30 TABLET | Refills: 6 | Status: SHIPPED | OUTPATIENT
Start: 2023-03-23

## 2023-05-19 ENCOUNTER — TELEPHONE (OUTPATIENT)
Dept: FAMILY MEDICINE CLINIC | Facility: CLINIC | Age: 73
End: 2023-05-19
Payer: MEDICARE

## 2023-05-19 RX ORDER — AZITHROMYCIN 250 MG/1
TABLET, FILM COATED ORAL
Qty: 6 TABLET | Refills: 0 | Status: SHIPPED | OUTPATIENT
Start: 2023-05-19

## 2023-05-19 NOTE — TELEPHONE ENCOUNTER
Pateint called and stated that for the last two days he has had the following symptoms: cough, congestion, low grade fever, and sore throat; patient would like to know if provider can call something into [facility]

## 2023-05-19 NOTE — TELEPHONE ENCOUNTER
Called and let patient know that prescription sent in and reminded about refill on cough medication patient voiced understanding

## 2023-06-06 ENCOUNTER — CLINICAL SUPPORT (OUTPATIENT)
Dept: AUDIOLOGY | Facility: CLINIC | Age: 73
End: 2023-06-06
Payer: MEDICARE

## 2023-06-06 ENCOUNTER — OFFICE VISIT (OUTPATIENT)
Dept: OTOLARYNGOLOGY | Facility: CLINIC | Age: 73
End: 2023-06-06
Payer: MEDICARE

## 2023-06-06 VITALS — HEIGHT: 65 IN | WEIGHT: 171 LBS | BODY MASS INDEX: 28.49 KG/M2 | HEART RATE: 105 BPM

## 2023-06-06 DIAGNOSIS — H81.02 MENIERE'S DISEASE OF LEFT EAR: Primary | ICD-10-CM

## 2023-06-06 DIAGNOSIS — H90.3 ASYMMETRICAL SENSORINEURAL HEARING LOSS: ICD-10-CM

## 2023-06-06 DIAGNOSIS — H90.3 SENSORINEURAL HEARING LOSS, ASYMMETRICAL: Primary | ICD-10-CM

## 2023-06-06 DIAGNOSIS — H81.02 MENIERE'S DISEASE, LEFT EAR: ICD-10-CM

## 2023-06-06 PROCEDURE — 99214 OFFICE O/P EST MOD 30 MIN: CPT | Performed by: OTOLARYNGOLOGY

## 2023-06-06 PROCEDURE — 92557 COMPREHENSIVE HEARING TEST: CPT | Performed by: AUDIOLOGIST

## 2023-06-06 PROCEDURE — 1160F RVW MEDS BY RX/DR IN RCRD: CPT | Performed by: OTOLARYNGOLOGY

## 2023-06-06 PROCEDURE — 1159F MED LIST DOCD IN RCRD: CPT | Performed by: OTOLARYNGOLOGY

## 2023-06-06 PROCEDURE — 92567 TYMPANOMETRY: CPT | Performed by: AUDIOLOGIST

## 2023-06-06 RX ORDER — TRIAMTERENE AND HYDROCHLOROTHIAZIDE 37.5; 25 MG/1; MG/1
1 TABLET ORAL DAILY
Qty: 30 TABLET | Refills: 11 | Status: SHIPPED | OUTPATIENT
Start: 2023-06-06

## 2023-06-06 NOTE — PROGRESS NOTES
STANDARD AUDIOMETRIC EVALUATION      Name:  Yehuda Rutledge  :  1950  Age:  72 y.o.  Date of Evaluation:  2023      HISTORY    Reason for visit:  Yehuda Rutledge is seen today for a  6 month follow up hearing test at the request of Dr. Angel Mendez.  Patient reports a history of Meniere's Disease in his left ear, buzzing in his left ear, and asymmetrical sensorineural hearing loss worse in the left ear.  Today, he reports his ears are doing well, and his hearing seems okay.  He states he no longer hears the buzzing in his ear.  He states he has had a recent sinus infection which has caused problems with his ears.       EVALUATION    See Audiogram    RESULTS        Otoscopy and Tympanometry 226 Hz :  Right Ear:  Otoscopy:  Clear ear canal          Tympanometry:  Middle ear function within normal limits    Left Ear:   Otoscopy:  Clear ear canal        Tympanometry:  Middle ear function within normal limits    Test technique:  Standard Audiometry     Pure Tone Audiometry:   Patient responded to pure tones at 10-50 dB for 250-8000 Hz in right ear, and at 20-60 dB for 250-8000 Hz in left ear.       Speech Audiometry:        Right Ear:  Speech Reception Threshold (SRT) was obtained at 10 dBHL                 Speech Discrimination scores were 100% in quiet when words were presented at 50 dBHL       Left Ear:  Speech Reception Threshold (SRT) was obtained at 20 dBHL                 Speech Discrimination scores were 80% in quiet when words were presented at 60 dBHL    Reliability:   good    IMPRESSIONS:  1.  Tympanometry results are consistent with Middle ear function within normal limits in both ears.  2.  Pure tone results are consistent with within normal limits to moderate sloping, high frequency sensorineural hearing loss for right ear, and mild to moderate reverse cookie bite sensorineural hearing loss  in left ear.       RECOMMENDATIONS:  Patient is seeing the Ear Nose and Throat physician  immediately following this examination.  It was a pleasure seeing Yehuda Rutledge in Audiology today.  We would be happy to do further testing or discuss these test as necessary.          This document has been electronically signed by Valencia Andrew MS CCC-A on June 6, 2023 16:26 CDT       Valencia Andrew MS CCC-A  Licensed Audiologist

## 2023-06-06 NOTE — PROGRESS NOTES
"Subjective   Yehuda Rutledge is a 72 y.o. male.       History of Present Illness   Patient has left Ménière's disease.  Responded to treatment with triamterene/HCTZ 37.5/25 daily.  Hearing objectively and subjectively improved although it is not symmetrical with his right side.  Dizziness has resolved.  Says that he feels like his ears are \"better\".  No dizzy spells since last visit.  Had a normal BMP back in December and is due to see Dr. Sifuentes in August.  Says he watches his salt as instructed.      The following portions of the patient's history were reviewed and updated as appropriate: allergies, current medications, past family history, past medical history, past social history, past surgical history and problem list.     reports that he quit smoking about 5 years ago. His smoking use included cigarettes. He has a 40.00 pack-year smoking history. He has been exposed to tobacco smoke. He has never used smokeless tobacco. He reports that he does not currently use alcohol. He reports current drug use.   Patient is not a tobacco user and has not been counseled for use of tobacco products      Review of Systems        Objective   Physical Exam  Ears: External ears no deformity, canals no discharge, tympanic membranes intact clear and mobile bilaterally.    Audiogram is obtained and reviewed and shows bilateral sensorineural hearing loss with an essentially symmetrical high-frequency component and a low-frequency component on the left that is not present on the right.  This is stable from his previous audiogram in December and still improved from his pretreatment audiogram.  Discrimination is 100% on the right 80% on the left.  Tympanograms are type a bilaterally.         Assessment and Plan   Diagnoses and all orders for this visit:    1. Meniere's disease of left ear (Primary)    2. Asymmetrical sensorineural hearing loss    Other orders  -     triamterene-hydrochlorothiazide (MAXZIDE-25) 37.5-25 MG per tablet; " Take 1 tablet by mouth Daily.  Dispense: 30 tablet; Refill: 11             Plan: New prescription written for triamterene HCTZ that he will take daily.  1 year refills are supplied.  Continue low-salt diet.  If no breakthrough symptoms return 1 year with another audiogram and call sooner for problems.

## 2023-08-15 ENCOUNTER — OFFICE VISIT (OUTPATIENT)
Dept: FAMILY MEDICINE CLINIC | Facility: CLINIC | Age: 73
End: 2023-08-15
Payer: MEDICARE

## 2023-08-15 ENCOUNTER — LAB (OUTPATIENT)
Dept: LAB | Facility: HOSPITAL | Age: 73
End: 2023-08-15
Payer: MEDICARE

## 2023-08-15 VITALS
SYSTOLIC BLOOD PRESSURE: 144 MMHG | HEART RATE: 80 BPM | TEMPERATURE: 97.5 F | BODY MASS INDEX: 28.91 KG/M2 | OXYGEN SATURATION: 95 % | DIASTOLIC BLOOD PRESSURE: 82 MMHG | HEIGHT: 65 IN | WEIGHT: 173.5 LBS

## 2023-08-15 DIAGNOSIS — Z79.899 HIGH RISK MEDICATION USE: ICD-10-CM

## 2023-08-15 DIAGNOSIS — R09.81 SINUS CONGESTION: Chronic | ICD-10-CM

## 2023-08-15 DIAGNOSIS — M54.42 CHRONIC BILATERAL LOW BACK PAIN WITH BILATERAL SCIATICA: Chronic | ICD-10-CM

## 2023-08-15 DIAGNOSIS — E78.5 HYPERLIPIDEMIA, UNSPECIFIED HYPERLIPIDEMIA TYPE: ICD-10-CM

## 2023-08-15 DIAGNOSIS — G89.29 CHRONIC BILATERAL LOW BACK PAIN WITH BILATERAL SCIATICA: Chronic | ICD-10-CM

## 2023-08-15 DIAGNOSIS — E78.5 HYPERLIPIDEMIA, UNSPECIFIED HYPERLIPIDEMIA TYPE: Primary | Chronic | ICD-10-CM

## 2023-08-15 DIAGNOSIS — J41.0 SIMPLE CHRONIC BRONCHITIS: ICD-10-CM

## 2023-08-15 DIAGNOSIS — M54.12 CHRONIC CERVICAL RADICULOPATHY: Chronic | ICD-10-CM

## 2023-08-15 DIAGNOSIS — M54.41 CHRONIC BILATERAL LOW BACK PAIN WITH BILATERAL SCIATICA: Chronic | ICD-10-CM

## 2023-08-15 LAB
ALBUMIN SERPL-MCNC: 5 G/DL (ref 3.5–5.2)
ALBUMIN/GLOB SERPL: 1.6 G/DL
ALP SERPL-CCNC: 98 U/L (ref 39–117)
ALT SERPL W P-5'-P-CCNC: 26 U/L (ref 1–41)
ANION GAP SERPL CALCULATED.3IONS-SCNC: 16 MMOL/L (ref 5–15)
AST SERPL-CCNC: 26 U/L (ref 1–40)
BASOPHILS # BLD AUTO: 0.06 10*3/MM3 (ref 0–0.2)
BASOPHILS NFR BLD AUTO: 0.7 % (ref 0–1.5)
BILIRUB SERPL-MCNC: 0.3 MG/DL (ref 0–1.2)
BILIRUB UR QL STRIP: NEGATIVE
BUN SERPL-MCNC: 17 MG/DL (ref 8–23)
BUN/CREAT SERPL: 13.7 (ref 7–25)
CALCIUM SPEC-SCNC: 10.3 MG/DL (ref 8.6–10.5)
CHLORIDE SERPL-SCNC: 97 MMOL/L (ref 98–107)
CHOLEST SERPL-MCNC: 255 MG/DL (ref 0–200)
CLARITY UR: CLEAR
CO2 SERPL-SCNC: 23 MMOL/L (ref 22–29)
COLOR UR: YELLOW
CREAT SERPL-MCNC: 1.24 MG/DL (ref 0.76–1.27)
DEPRECATED RDW RBC AUTO: 42 FL (ref 37–54)
EGFRCR SERPLBLD CKD-EPI 2021: 62.5 ML/MIN/1.73
EOSINOPHIL # BLD AUTO: 0.14 10*3/MM3 (ref 0–0.4)
EOSINOPHIL NFR BLD AUTO: 1.6 % (ref 0.3–6.2)
ERYTHROCYTE [DISTWIDTH] IN BLOOD BY AUTOMATED COUNT: 14.5 % (ref 12.3–15.4)
GLOBULIN UR ELPH-MCNC: 3.1 GM/DL
GLUCOSE SERPL-MCNC: 104 MG/DL (ref 65–99)
GLUCOSE UR STRIP-MCNC: NEGATIVE MG/DL
HCT VFR BLD AUTO: 44.6 % (ref 37.5–51)
HDLC SERPL-MCNC: 36 MG/DL (ref 40–60)
HGB BLD-MCNC: 15.4 G/DL (ref 13–17.7)
HGB UR QL STRIP.AUTO: NEGATIVE
IMM GRANULOCYTES # BLD AUTO: 0.04 10*3/MM3 (ref 0–0.05)
IMM GRANULOCYTES NFR BLD AUTO: 0.5 % (ref 0–0.5)
KETONES UR QL STRIP: NEGATIVE
LDLC SERPL CALC-MCNC: 104 MG/DL (ref 0–100)
LDLC/HDLC SERPL: 2.38 {RATIO}
LEUKOCYTE ESTERASE UR QL STRIP.AUTO: NEGATIVE
LYMPHOCYTES # BLD AUTO: 3.63 10*3/MM3 (ref 0.7–3.1)
LYMPHOCYTES NFR BLD AUTO: 40.9 % (ref 19.6–45.3)
MCH RBC QN AUTO: 28 PG (ref 26.6–33)
MCHC RBC AUTO-ENTMCNC: 34.5 G/DL (ref 31.5–35.7)
MCV RBC AUTO: 81.1 FL (ref 79–97)
MONOCYTES # BLD AUTO: 0.59 10*3/MM3 (ref 0.1–0.9)
MONOCYTES NFR BLD AUTO: 6.7 % (ref 5–12)
NEUTROPHILS NFR BLD AUTO: 4.41 10*3/MM3 (ref 1.7–7)
NEUTROPHILS NFR BLD AUTO: 49.6 % (ref 42.7–76)
NITRITE UR QL STRIP: NEGATIVE
NRBC BLD AUTO-RTO: 0 /100 WBC (ref 0–0.2)
PH UR STRIP.AUTO: 7 [PH] (ref 5–8)
PLATELET # BLD AUTO: 300 10*3/MM3 (ref 140–450)
PMV BLD AUTO: 10.6 FL (ref 6–12)
POTASSIUM SERPL-SCNC: 4.2 MMOL/L (ref 3.5–5.2)
PROT SERPL-MCNC: 8.1 G/DL (ref 6–8.5)
PROT UR QL STRIP: NEGATIVE
RBC # BLD AUTO: 5.5 10*6/MM3 (ref 4.14–5.8)
SODIUM SERPL-SCNC: 136 MMOL/L (ref 136–145)
SP GR UR STRIP: 1.01 (ref 1–1.03)
TRIGL SERPL-MCNC: 666 MG/DL (ref 0–150)
TSH SERPL DL<=0.05 MIU/L-ACNC: 2.44 UIU/ML (ref 0.27–4.2)
UROBILINOGEN UR QL STRIP: NORMAL
VLDLC SERPL-MCNC: 115 MG/DL (ref 5–40)
WBC NRBC COR # BLD: 8.87 10*3/MM3 (ref 3.4–10.8)

## 2023-08-15 PROCEDURE — 80061 LIPID PANEL: CPT

## 2023-08-15 PROCEDURE — 85025 COMPLETE CBC W/AUTO DIFF WBC: CPT

## 2023-08-15 PROCEDURE — 84443 ASSAY THYROID STIM HORMONE: CPT

## 2023-08-15 PROCEDURE — 99214 OFFICE O/P EST MOD 30 MIN: CPT | Performed by: FAMILY MEDICINE

## 2023-08-15 PROCEDURE — 81003 URINALYSIS AUTO W/O SCOPE: CPT

## 2023-08-15 PROCEDURE — 80053 COMPREHEN METABOLIC PANEL: CPT

## 2023-08-15 RX ORDER — ALBUTEROL SULFATE 90 UG/1
2 AEROSOL, METERED RESPIRATORY (INHALATION) EVERY 4 HOURS PRN
Qty: 18 G | Refills: 2 | Status: SHIPPED | OUTPATIENT
Start: 2023-08-15

## 2023-08-15 RX ORDER — ACETYLCYSTEINE 600 MG
600 CAPSULE ORAL 2 TIMES DAILY
Qty: 60 CAPSULE | Refills: 4 | Status: SHIPPED | OUTPATIENT
Start: 2023-08-15

## 2023-08-15 RX ORDER — IPRATROPIUM BROMIDE AND ALBUTEROL SULFATE 2.5; .5 MG/3ML; MG/3ML
3 SOLUTION RESPIRATORY (INHALATION) EVERY 4 HOURS PRN
Qty: 360 ML | Refills: 2 | Status: SHIPPED | OUTPATIENT
Start: 2023-08-15

## 2023-08-15 RX ORDER — CETIRIZINE HYDROCHLORIDE, PSEUDOEPHEDRINE HYDROCHLORIDE 5; 120 MG/1; MG/1
1 TABLET, FILM COATED, EXTENDED RELEASE ORAL 2 TIMES DAILY
Qty: 60 TABLET | Refills: 3 | Status: SHIPPED | OUTPATIENT
Start: 2023-08-15

## 2023-08-15 RX ORDER — PRAVASTATIN SODIUM 20 MG
20 TABLET ORAL NIGHTLY
Qty: 90 TABLET | Refills: 1 | Status: SHIPPED | OUTPATIENT
Start: 2023-08-15

## 2023-08-15 RX ORDER — GABAPENTIN 100 MG/1
100 CAPSULE ORAL 2 TIMES DAILY
Qty: 60 CAPSULE | Refills: 3 | Status: SHIPPED | OUTPATIENT
Start: 2023-08-15

## 2023-08-15 NOTE — PROGRESS NOTES
Chief Complaint  Arthritis, Back Pain, Insomnia, COPD, Hyperlipidemia, and Allergies    Subjective          Review of Systems   Constitutional:  Negative for chills, fatigue and fever.   HENT:  Negative for congestion, ear pain, facial swelling, mouth sores, postnasal drip, sinus pressure, sinus pain and sore throat.         Followed by ENT for Meniere's   On Maxide no symptoms.    Eyes:  Negative for pain, discharge, itching and visual disturbance.   Respiratory:  Positive for cough, sputum production, shortness of breath and wheezing. Negative for chest tightness.    Cardiovascular:  Negative for chest pain, palpitations and leg swelling.   Gastrointestinal:  Positive for abdominal pain and constipation. Negative for blood in stool.        H/O diverticulitis with abd abscess, S/P percutaneous drain, improved   Endocrine: Negative.  Negative for cold intolerance and heat intolerance.   Genitourinary: Negative.  Negative for difficulty urinating, dysuria, hematuria and urgency.   Musculoskeletal:  Positive for arthralgias, arthritis, back pain, joint swelling, neck pain and stiffness. Negative for myalgias.        L Neck and Back pain stable, Neurontin helps.   Skin: Negative.  Negative for color change, pallor and wound. Rash: ear.  Allergic/Immunologic: Positive for environmental allergies. Negative for food allergies and immunocompromised state.   Neurological:  Positive for dizziness and headaches. Negative for tremors and speech difficulty.   Hematological:  Negative for adenopathy. Does not bruise/bleed easily.   Psychiatric/Behavioral:  Negative for agitation, dysphoric mood and sleep disturbance. The patient has insomnia. The patient is not nervous/anxious and is not hyperactive.      Yehuda Rutledge presents to [facility] [place]  Arthritis  Presents for follow-up visit. He complains of stiffness and joint swelling. The symptoms have been improving. Affected  location: Multiple joints. His pain is at a severity of 5/10. Pertinent negatives include no dysuria, fatigue or fever. Rash: ear.  Back Pain  This is a chronic problem. The current episode started more than 1 year ago. The problem occurs daily. The problem has been waxing and waning since onset. The pain is at a severity of 4/10. The pain is moderate. Associated symptoms include abdominal pain and headaches. Pertinent negatives include no chest pain, dysuria or fever. He has tried NSAIDs, muscle relaxant, home exercises, bed rest and analgesics for the symptoms. The treatment provided mild relief.   Insomnia  This is a chronic problem. Associated symptoms include abdominal pain, arthralgias, coughing, headaches, joint swelling and neck pain. Pertinent negatives include no chest pain, chills, congestion, fatigue, fever, myalgias or sore throat. Rash: ear.The symptoms are aggravated by stress (Pain). He has tried sleep and rest (Elavil. Trazadone, OTC meds) for the symptoms. The treatment provided moderate relief.   COPD  He complains of cough, shortness of breath, sputum production and wheezing. This is a chronic problem. The current episode started more than 1 year ago. The problem occurs daily. The problem has been waxing and waning. Associated symptoms include headaches. Pertinent negatives include no chest pain, ear pain, fever, myalgias, postnasal drip or sore throat. His symptoms are alleviated by beta-agonist and ipratropium. He reports moderate improvement on treatment. His past medical history is significant for COPD.   Hyperlipidemia  This is a chronic problem. The problem is controlled. Associated symptoms include shortness of breath. Pertinent negatives include no chest pain or myalgias. The current treatment provides mild improvement of lipids.   Allergies  This is a chronic problem. The current episode started more than 1 year ago. The problem has been waxing and waning. Associated symptoms include  "abdominal pain, arthralgias, coughing, headaches, joint swelling and neck pain. Pertinent negatives include no chest pain, chills, congestion, fatigue, fever, myalgias or sore throat. Rash: ear.The symptoms are aggravated by coughing. Treatments tried: Antihistamine. The treatment provided mild relief.     Objective   Vital Signs:   /82 (BP Location: Right arm, Patient Position: Sitting, Cuff Size: Adult)   Pulse 80   Temp 97.5 øF (36.4 øC) (Temporal)   Ht 165.1 cm (65\")   Wt 78.7 kg (173 lb 8 oz)   SpO2 95%   BMI 28.87 kg/mý     Physical Exam  Vitals and nursing note reviewed.   Constitutional:       General: He is not in acute distress.  HENT:      Head: Atraumatic.      Right Ear: Tympanic membrane, ear canal and external ear normal. There is no impacted cerumen.      Left Ear: Tympanic membrane, ear canal and external ear normal. There is no impacted cerumen.      Nose: No congestion or rhinorrhea.      Mouth/Throat:      Mouth: Mucous membranes are moist.      Pharynx: No oropharyngeal exudate or posterior oropharyngeal erythema.   Eyes:      General: No scleral icterus.        Right eye: No discharge.         Left eye: No discharge.      Extraocular Movements: Extraocular movements intact.      Conjunctiva/sclera: Conjunctivae normal.      Pupils: Pupils are equal, round, and reactive to light.   Cardiovascular:      Rate and Rhythm: Normal rate and regular rhythm.      Heart sounds: No murmur heard.    No gallop.   Pulmonary:      Effort: Pulmonary effort is normal.      Breath sounds: No wheezing or rhonchi.   Abdominal:      General: Bowel sounds are normal. There is no distension.      Palpations: Abdomen is soft. There is no mass.      Tenderness: There is no abdominal tenderness. There is no right CVA tenderness, left CVA tenderness, guarding or rebound.      Hernia: No hernia is present.      Comments:        Musculoskeletal:         General: Normal range of motion.      Cervical back: " Normal range of motion. Muscular tenderness present.      Right lower leg: No edema.      Left lower leg: No edema.   Skin:     General: Skin is warm and dry.      Capillary Refill: Capillary refill takes 2 to 3 seconds.      Coloration: Skin is not jaundiced.      Findings: No bruising, erythema or lesion.   Neurological:      General: No focal deficit present.      Mental Status: He is oriented to person, place, and time.      Cranial Nerves: No cranial nerve deficit.      Sensory: No sensory deficit.      Motor: No weakness.      Coordination: Coordination normal.      Gait: Gait normal.      Deep Tendon Reflexes: Reflexes normal.   Psychiatric:         Mood and Affect: Mood normal.         Behavior: Behavior normal.         Thought Content: Thought content normal.         Judgment: Judgment normal.      Result Review :     CMP          12/6/2022    16:16   CMP   Glucose 89    BUN 18    Creatinine 1.26    EGFR 61.4    Sodium 136    Potassium 4.8    Chloride 102    Calcium 9.9    BUN/Creatinine Ratio 14.3    Anion Gap 9.0      CBC w/diff          8/31/2022    14:58   CBC w/Diff   WBC 10.07    RBC 5.06    Hemoglobin 13.7    Hematocrit 40.1    MCV 79.2    MCH 27.1    MCHC 34.2    RDW 15.5    Platelets 652    Neutrophil Rel % 71.5    Immature Granulocyte Rel % 0.5    Lymphocyte Rel % 23.5    Monocyte Rel % 3.2    Eosinophil Rel % 0.4    Basophil Rel % 0.9                            Assessment and Plan    Diagnoses and all orders for this visit:    1. Hyperlipidemia, unspecified hyperlipidemia type (Primary)  -     CBC & Differential; Future  -     Comprehensive metabolic panel; Future  -     TSH Rfx On Abnormal To Free T4; Future  -     Urinalysis With Culture If Indicated -; Future  -     Lipid Panel; Future  -     pravastatin (PRAVACHOL) 20 MG tablet; Take 1 tablet by mouth Every Night.  Dispense: 90 tablet; Refill: 1    2. High risk medication use  -     CBC & Differential; Future  -     Comprehensive metabolic  panel; Future  -     TSH Rfx On Abnormal To Free T4; Future  -     Urinalysis With Culture If Indicated -; Future  -     Lipid Panel; Future    3. Chronic cervical radiculopathy  -     gabapentin (NEURONTIN) 100 MG capsule; Take 1 capsule by mouth 2 (Two) Times a Day.  Dispense: 60 capsule; Refill: 3    4. Chronic bilateral low back pain with bilateral sciatica  -     gabapentin (NEURONTIN) 100 MG capsule; Take 1 capsule by mouth 2 (Two) Times a Day.  Dispense: 60 capsule; Refill: 3    5. Simple chronic bronchitis  -     ipratropium-albuterol (DUO-NEB) 0.5-2.5 mg/3 ml nebulizer; Take 3 mL by nebulization Every 4 (Four) Hours As Needed for Wheezing.  Dispense: 360 mL; Refill: 2  -     Acetylcysteine (NAC) capsule capsule; Take 1 capsule by mouth 2 (Two) Times a Day. For respiratory symptoms from COPD.  Dispense: 60 capsule; Refill: 4  -     albuterol sulfate  (90 Base) MCG/ACT inhaler; Inhale 2 puffs Every 4 (Four) Hours As Needed for Shortness of Air or Wheezing. 2 puffs by  Mouth every 4-6 hours  Dispense: 18 g; Refill: 2    6. Sinus congestion  -     cetirizine-pseudoephedrine (ZyrTEC-D) 5-120 MG per 12 hr tablet; Take 1 tablet by mouth 2 (Two) Times a Day.  Dispense: 60 tablet; Refill: 3        Follow Up   Return in about 4 months (around 12/15/2023).  Patient was given instructions and counseling regarding his condition or for health maintenance advice. Please see specific information pulled into the AVS if appropriate.         This document has been electronically signed by Buzz Sifuentes MD on August 15, 2023 19:03 CDT

## 2023-08-16 NOTE — PROGRESS NOTES
Cholesterol is still high make sure to take Pravastatin, and watch animal fats in diet, other labs are stable.    Spoke with pt to give lab results. Pt voiced understanding. States is taking pravastatin every other night, will try to take nightly.

## 2023-09-18 ENCOUNTER — OFFICE VISIT (OUTPATIENT)
Dept: FAMILY MEDICINE CLINIC | Facility: CLINIC | Age: 73
End: 2023-09-18
Payer: MEDICARE

## 2023-09-18 VITALS
HEART RATE: 74 BPM | TEMPERATURE: 97.3 F | OXYGEN SATURATION: 94 % | WEIGHT: 171.2 LBS | HEIGHT: 65 IN | BODY MASS INDEX: 28.52 KG/M2 | SYSTOLIC BLOOD PRESSURE: 124 MMHG | DIASTOLIC BLOOD PRESSURE: 76 MMHG

## 2023-09-18 DIAGNOSIS — Z00.00 MEDICARE ANNUAL WELLNESS VISIT, SUBSEQUENT: Primary | ICD-10-CM

## 2023-09-18 PROBLEM — R09.81 SINUS CONGESTION: Chronic | Status: RESOLVED | Noted: 2023-03-01 | Resolved: 2023-09-18

## 2023-09-18 PROBLEM — J22 LOWER RESPIRATORY INFECTION (E.G., BRONCHITIS, PNEUMONIA, PNEUMONITIS, PULMONITIS): Status: RESOLVED | Noted: 2018-05-27 | Resolved: 2023-09-18

## 2023-09-18 PROBLEM — K57.92 DIVERTICULITIS: Status: RESOLVED | Noted: 2021-11-18 | Resolved: 2023-09-18

## 2023-09-18 PROBLEM — Z23 NEED FOR IMMUNIZATION AGAINST INFLUENZA: Status: RESOLVED | Noted: 2020-09-12 | Resolved: 2023-09-18

## 2023-09-18 PROCEDURE — 1170F FXNL STATUS ASSESSED: CPT | Performed by: FAMILY MEDICINE

## 2023-09-18 PROCEDURE — G0439 PPPS, SUBSEQ VISIT: HCPCS | Performed by: FAMILY MEDICINE

## 2023-09-18 NOTE — PROGRESS NOTES
"Chief Complaint  Annual Exam (Subsequent Medicare Wellness)    Subjective     {Problem List  Visit Diagnosis   Encounters  Notes  Medications  Labs  Result Review Imaging  Media :23}     Review of Systems    Yehuda Rutledge presents to [facility] [place]  History of Present Illness    Objective   Vital Signs:   /76 (BP Location: Right arm, Patient Position: Sitting, Cuff Size: Adult)   Pulse 74   Temp 97.3 °F (36.3 °C) (Temporal)   Ht 165.1 cm (65\")   Wt 77.7 kg (171 lb 3.2 oz)   SpO2 94%   BMI 28.49 kg/m²     Physical Exam   Result Review :{Labs  Result Review  Imaging  Med Tab  Media :23}   {The following data was reviewed by (Optional):69073}  {Ambulatory Labs (Optional):39757}  {Data reviewed (Optional):75531:::1}          Assessment and Plan {CC Problem List  Visit Diagnosis  ROS  Review (Popup)  Health Maintenance  Quality  BestPractice  Medications  SmartSets  SnapShot Encounters  Media :23}   Diagnoses and all orders for this visit:    1. Medicare annual wellness visit, subsequent (Primary)      {Time Spent (Optional):84301}  Follow Up {Instructions Charge Capture  Follow-up Communications :23}  Return in about 1 year (around 9/18/2024) for Medicare Wellness Subsequent.  Patient was given instructions and counseling regarding his condition or for health maintenance advice. Please see specific information pulled into the AVS if appropriate.       "

## 2023-09-18 NOTE — PATIENT INSTRUCTIONS
Medicare Wellness  Personal Prevention Plan of Service     Date of Office Visit:    Encounter Provider:  Buzz Sifuentes MD  Place of Service:  [facility] [place]  Patient Name: Yehuda Rutledge  :  1950    As part of the Medicare Wellness portion of your visit today, we are providing you with this personalized preventive plan of services (PPPS). This plan is based upon recommendations of the [place] Preventive Services Task Force (USPSTF) and the Advisory Committee on Immunization Practices (ACIP).    This lists the preventive care services that should be considered, and provides dates of when you are due. Items listed as completed are up-to-date and do not require any further intervention.    Health Maintenance   Topic Date Due    LUNG CANCER SCREENING  2020    ZOSTER VACCINE (2 of 2) 2023 (Originally 2018)    COVID-19 Vaccine (3 - Pfizer series) 2024 (Originally 6/3/2021)    INFLUENZA VACCINE  10/01/2023    LIPID PANEL  08/15/2024    BMI FOLLOWUP  08/15/2024    ANNUAL WELLNESS VISIT  2024    TDAP/TD VACCINES (2 - Tdap) 2025    COLORECTAL CANCER SCREENING  2031    HEPATITIS C SCREENING  Completed    Pneumococcal Vaccine 65+  Completed    AAA SCREEN (ONE-TIME)  Completed       No orders of the defined types were placed in this encounter.      No follow-ups on file.

## 2023-09-18 NOTE — PROGRESS NOTES
The ABCs of the Annual Wellness Visit  Subsequent Medicare Wellness Visit    Subjective      Yehuda Rutledge is a 73 y.o. male who presents for a Subsequent Medicare Wellness Visit.    The following portions of the patient's history were reviewed and   updated as appropriate: allergies, current medications, past family history, past medical history, past social history, past surgical history, and problem list.    Compared to one year ago, the patient feels his physical   health is better.    Compared to one year ago, the patient feels his mental   health is the same.    Recent Hospitalizations:  He was not admitted to the hospital during the last year.       Current Medical Providers:  Patient Care Team:  Buzz Sifuentes MD as PCP - General  Sincere Santamaria OD (Optometry)  Helene Rodríguez MA as Medical Assistant (Family Medicine)  Cinthya Truong LPN as Licensed Practical Nurse (Family Medicine)    Outpatient Medications Prior to Visit   Medication Sig Dispense Refill    Acetylcysteine (NAC) capsule capsule Take 1 capsule by mouth 2 (Two) Times a Day. For respiratory symptoms from COPD. 60 capsule 4    albuterol sulfate  (90 Base) MCG/ACT inhaler Inhale 2 puffs Every 4 (Four) Hours As Needed for Shortness of Air or Wheezing. 2 puffs by  Mouth every 4-6 hours 18 g 2    Ascorbic Acid (VITAMIN C PO) Take 1 tablet by mouth Daily.      aspirin 81 MG EC tablet Take 1 tablet by mouth Daily.      cetirizine-pseudoephedrine (ZyrTEC-D) 5-120 MG per 12 hr tablet Take 1 tablet by mouth 2 (Two) Times a Day. 60 tablet 3    fluticasone (FLONASE) 50 MCG/ACT nasal spray 2 sprays into the nostril(s) as directed by provider Daily. Administer 2 sprays in each nostril for each dose. 18.2 mL 5    gabapentin (NEURONTIN) 100 MG capsule Take 1 capsule by mouth 2 (Two) Times a Day. 60 capsule 3    guaiFENesin (MUCINEX PO) Take 1 tablet by mouth As Needed.      ipratropium-albuterol (DUO-NEB) 0.5-2.5 mg/3 ml  nebulizer Take 3 mL by nebulization Every 4 (Four) Hours As Needed for Wheezing. 360 mL 2    lisinopril (PRINIVIL,ZESTRIL) 10 MG tablet Take 1 tablet by mouth Daily.      Misc Natural Products (OSTEO BI-FLEX JOINT SHIELD PO) Take 1 tablet by mouth Daily.      Misc Natural Products (PROSTATE HEALTH) capsule Take 1 capsule by mouth 2 (Two) Times a Day.      Multiple Vitamins-Minerals (MULTIVITAMIN ADULT PO) Take 1 tablet by mouth Daily.      Multiple Vitamins-Minerals (ZINC PO) Take 1 tablet by mouth Daily.      Omega-3 Fatty Acids (FISH OIL) 1000 MG capsule capsule Take 1 capsule by mouth Daily With Breakfast.      polyethylene glycol (MIRALAX) 17 g packet Take 17 g by mouth 2 (Two) Times a Day As Needed (Constipation). 20 packet 1    pravastatin (PRAVACHOL) 20 MG tablet Take 1 tablet by mouth Every Night. 90 tablet 1    sodium chloride 0.65 % nasal spray 1 spray into the nostril(s) as directed by provider As Needed.      triamterene-hydrochlorothiazide (MAXZIDE-25) 37.5-25 MG per tablet Take 1 tablet by mouth Daily. 30 tablet 11     No facility-administered medications prior to visit.       No opioid medication identified on active medication list. I have reviewed chart for other potential  high risk medication/s and harmful drug interactions in the elderly.        Aspirin is on active medication list. Aspirin use is indicated based on review of current medical condition/s. Pros and cons of this therapy have been discussed today. Benefits of this medication outweigh potential harm.  Patient has been encouraged to continue taking this medication.  .      Patient Active Problem List   Diagnosis    Simple chronic bronchitis    Environmental allergies    Right-sided low back pain with right-sided sciatica    Overweight (BMI 25.0-29.9)    Stage 2 moderate COPD by GOLD classification    Adenopathy, cervical    High risk medication use    Chronic cervical radiculopathy    Incisional hernia, without obstruction or gangrene  "   Diabetes mellitus screening    Hyperlipidemia    Hyperglycemia    Polyuria    H/O umbilical hernia repair    Former smoker    Arthritis    Actinic keratosis    Routine medical exam    Foot pain, bilateral    Insomnia    Bilateral foot pain    Lumbago with sciatica    Chronic fatigue    Dyspnea on exertion    Right-sided chest pain    Personal history of tobacco use, presenting hazards to health    Chronic allergic rhinitis    Generalized abdominal pain    Ear problem, bilateral     Advance Care Planning   Advance Care Planning     Advance Directive is not on file.  ACP discussion was held with the patient during this visit. Patient has an advance directive (not in EMR), copy requested.     Objective    Vitals:    23 0859   BP: 124/76   BP Location: Right arm   Patient Position: Sitting   Cuff Size: Adult   Pulse: 74   Temp: 97.3 °F (36.3 °C)   TempSrc: Temporal   SpO2: 94%   Weight: 77.7 kg (171 lb 3.2 oz)   Height: 165.1 cm (65\")   PainSc: 0-No pain     Estimated body mass index is 28.49 kg/m² as calculated from the following:    Height as of this encounter: 165.1 cm (65\").    Weight as of this encounter: 77.7 kg (171 lb 3.2 oz).    BMI is >= 25 and <30. (Overweight) The following options were offered after discussion;: weight loss educational material (shared in after visit summary), exercise counseling/recommendations, nutrition counseling/recommendations, and referral to primary care      Does the patient have evidence of cognitive impairment?   No    Lab Results   Component Value Date    TRIG 666 (H) 08/15/2023    HDL 36 (L) 08/15/2023     (H) 08/15/2023    VLDL 115 (H) 08/15/2023          HEALTH RISK ASSESSMENT    Smoking Status:  Social History     Tobacco Use   Smoking Status Former    Packs/day: 1.00    Years: 40.00    Pack years: 40.00    Types: Cigarettes    Quit date: 2017    Years since quittin.8    Passive exposure: Past   Smokeless Tobacco Never   Tobacco Comments    Passive " for 14 years     Alcohol Consumption:  Social History     Substance and Sexual Activity   Alcohol Use Not Currently    Comment: none in 20+ years     Fall Risk Screen:    ALEJANDRA Fall Risk Assessment was completed, and patient is at LOW risk for falls.Assessment completed on:2023    Depression Screenin/18/2023     9:03 AM   PHQ-2/PHQ-9 Depression Screening   Little Interest or Pleasure in Doing Things 0-->not at all   Feeling Down, Depressed or Hopeless 0-->not at all   PHQ-9: Brief Depression Severity Measure Score 0       Health Habits and Functional and Cognitive Screenin/18/2023     9:01 AM   Functional & Cognitive Status   Do you have difficulty preparing food and eating? No   Do you have difficulty bathing yourself, getting dressed or grooming yourself? No   Do you have difficulty using the toilet? No   Do you have difficulty moving around from place to place? No   Do you have trouble with steps or getting out of a bed or a chair? No   Current Diet Well Balanced Diet   Dental Exam Up to date   Eye Exam Up to date   Exercise (times per week) 6 times per week   Current Exercises Include Walking;Yard Work   Do you need help using the phone?  No   Are you deaf or do you have serious difficulty hearing?  Yes   Do you need help to go to places out of walking distance? No   Do you need help shopping? No   Do you need help preparing meals?  No   Do you need help with housework?  No   Do you need help with laundry? No   Do you need help taking your medications? No   Do you need help managing money? No   Do you ever drive or ride in a car without wearing a seat belt? Yes   Have you felt unusual stress, anger or loneliness in the last month? No   Who do you live with? Alone   If you need help, do you have trouble finding someone available to you? No   Have you been bothered in the last four weeks by sexual problems? No   Do you have difficulty concentrating, remembering or making decisions? No        Age-appropriate Screening Schedule:  Refer to the list below for future screening recommendations based on patient's age, sex and/or medical conditions. Orders for these recommended tests are listed in the plan section. The patient has been provided with a written plan.    Health Maintenance   Topic Date Due    LUNG CANCER SCREENING  03/27/2020    ZOSTER VACCINE (2 of 2) 11/28/2023 (Originally 5/16/2018)    COVID-19 Vaccine (3 - Pfizer series) 09/03/2024 (Originally 6/3/2021)    INFLUENZA VACCINE  10/01/2023    LIPID PANEL  08/15/2024    BMI FOLLOWUP  08/15/2024    ANNUAL WELLNESS VISIT  09/18/2024    TDAP/TD VACCINES (2 - Tdap) 07/31/2025    COLORECTAL CANCER SCREENING  11/08/2031    HEPATITIS C SCREENING  Completed    Pneumococcal Vaccine 65+  Completed    AAA SCREEN (ONE-TIME)  Completed                  CMS Preventative Services Quick Reference  Risk Factors Identified During Encounter:    Chronic Pain: Natural history and expected course discussed. Questions answered.    The above risks/problems have been discussed with the patient.  Pertinent information has been shared with the patient in the After Visit Summary.    Diagnoses and all orders for this visit:    1. Medicare annual wellness visit, subsequent (Primary)        Follow Up:   Next Medicare Wellness visit to be scheduled in 1 year.      An After Visit Summary and PPPS were made available to the patient.        This document has been electronically signed by Buzz Sifuentes MD on September 18, 2023 10:33 CDT

## 2025-07-21 NOTE — PATIENT INSTRUCTIONS
Controlled refill request:  Requested Prescriptions     Pending Prescriptions Disp Refills    colestipol (COLESTID) 1 g tablet [Pharmacy Med Name: colestipol 1 gram tablet] 180 tablet 4     Sig: TAKE ONE TABLET BY MOUTH TWICE DAILY    oxybutynin XL (DITROPAN XL) 15 MG 24 hr tablet [Pharmacy Med Name: oxybutynin chloride ER 15 mg tablet,extended release 24 hr] 90 tablet 4     Sig: TAKE ONE TABLET BY MOUTH EVERY DAY    DULoxetine (CYMBALTA) 60 MG capsule [Pharmacy Med Name: duloxetine 60 mg capsule,delayed release] 90 capsule 4     Sig: TAKE ONE CAPSULE BY MOUTH EVERY DAY    gabapentin (NEURONTIN) 300 MG capsule [Pharmacy Med Name: gabapentin 300 mg capsule] 30 capsule 4     Sig: TAKE ONE CAPSULE BY MOUTH EVERY NIGHT AT BEDTIME      Last OV:  6/9/2025  Next OV:  7/19/2025  Last fill:  6/24/25  Last tox:  12/3/24     Lymphadenopathy  Lymphadenopathy refers to swollen or enlarged lymph glands, also called lymph nodes. Lymph glands are part of your body's defense (immune) system, which protects the body from infections, germs, and diseases. Lymph glands are found in many locations in your body, including the neck, underarm, and groin.   Many things can cause lymph glands to become enlarged. When your immune system responds to germs, such as viruses or bacteria, infection-fighting cells and fluid build up. This causes the glands to grow in size. Usually, this is not something to worry about. The swelling and any soreness often go away without treatment. However, swollen lymph glands can also be caused by a number of diseases. Your health care provider may do various tests to help determine the cause. If the cause of your swollen lymph glands cannot be found, it is important to monitor your condition to make sure the swelling goes away.  HOME CARE INSTRUCTIONS  Watch your condition for any changes. The following actions may help to lessen any discomfort you are feeling:  · Get plenty of rest.  · Take medicines only as directed by your health care provider. Your health care provider may recommend over-the-counter medicines for pain.  · Apply moist heat compresses to the site of swollen lymph nodes as directed by your health care provider. This can help reduce any pain.  · Check your lymph nodes daily for any changes.  · Keep all follow-up visits as directed by your health care provider. This is important.  SEEK MEDICAL CARE IF:  · Your lymph nodes are still swollen after 2 weeks.  · Your swelling increases or spreads to other areas.  · Your lymph nodes are hard, seem fixed to the skin, or are growing rapidly.  · Your skin over the lymph nodes is red and inflamed.  · You have a fever.  · You have chills.  · You have fatigue.  · You develop a sore throat.  · You have abdominal pain.  · You have weight loss.  · You have night  sweats.  SEEK IMMEDIATE MEDICAL CARE IF:  · You notice fluid leaking from the area of the enlarged lymph node.  · You have severe pain in any area of your body.  · You have chest pain.  · You have shortness of breath.     This information is not intended to replace advice given to you by your health care provider. Make sure you discuss any questions you have with your health care provider.     Document Released: 09/26/2009 Document Revised: 01/08/2016 Document Reviewed: 07/23/2015  [employer] Interactive Patient Education ©2016 [employer]  Acute Bronchitis  Bronchitis is inflammation of the airways that extend from the windpipe into the lungs (bronchi). The inflammation often causes mucus to develop. This leads to a cough, which is the most common symptom of bronchitis.   In acute bronchitis, the condition usually develops suddenly and goes away over time, usually in a couple weeks. Smoking, allergies, and asthma can make bronchitis worse. Repeated episodes of bronchitis may cause further lung problems.   CAUSES  Acute bronchitis is most often caused by the same virus that causes a cold. The virus can spread from person to person (contagious) through coughing, sneezing, and touching contaminated objects.  SIGNS AND SYMPTOMS   · Cough.    · Fever.    · Coughing up mucus.    · Body aches.    · Chest congestion.    · Chills.    · Shortness of breath.    · Sore throat.    DIAGNOSIS   Acute bronchitis is usually diagnosed through a physical exam. Your health care provider will also ask you questions about your medical history. Tests, such as chest X-rays, are sometimes done to rule out other conditions.   TREATMENT   Acute bronchitis usually goes away in a couple weeks. Oftentimes, no medical treatment is necessary. Medicines are sometimes given for relief of fever or cough. Antibiotic medicines are usually not needed but may be prescribed in certain situations. In some cases, an inhaler may be recommended to help  reduce shortness of breath and control the cough. A cool mist vaporizer may also be used to help thin bronchial secretions and make it easier to clear the chest.   HOME CARE INSTRUCTIONS  · Get plenty of rest.    · Drink enough fluids to keep your urine clear or pale yellow (unless you have a medical condition that requires fluid restriction). Increasing fluids may help thin your respiratory secretions (sputum) and reduce chest congestion, and it will prevent dehydration.    · Take medicines only as directed by your health care provider.  · If you were prescribed an antibiotic medicine, finish it all even if you start to feel better.  · Avoid smoking and secondhand smoke. Exposure to cigarette smoke or irritating chemicals will make bronchitis worse. If you are a smoker, consider using nicotine gum or skin patches to help control withdrawal symptoms. Quitting smoking will help your lungs heal faster.    · Reduce the chances of another bout of acute bronchitis by washing your hands frequently, avoiding people with cold symptoms, and trying not to touch your hands to your mouth, nose, or eyes.    · Keep all follow-up visits as directed by your health care provider.    SEEK MEDICAL CARE IF:  Your symptoms do not improve after 1 week of treatment.   SEEK IMMEDIATE MEDICAL CARE IF:  · You develop an increased fever or chills.    · You have chest pain.    · You have severe shortness of breath.  · You have bloody sputum.    · You develop dehydration.  · You faint or repeatedly feel like you are going to pass out.  · You develop repeated vomiting.  · You develop a severe headache.  MAKE SURE YOU:   · Understand these instructions.  · Will watch your condition.  · Will get help right away if you are not doing well or get worse.     This information is not intended to replace advice given to you by your health care provider. Make sure you discuss any questions you have with your health care provider.     Document Released:  01/25/2006 Document Revised: 01/08/2016 Document Reviewed: 06/10/2014  "[employer] Interactive Patient Education ©2016 "[employer].  Sinusitis, Adult  Sinusitis is soreness and inflammation of your sinuses. Sinuses are hollow spaces in the bones around your face. Your sinuses are located:  · Around your eyes.  · In the middle of your forehead.  · Behind your nose.  · In your cheekbones.  Your sinuses and nasal passages are lined with a stringy fluid (mucus). Mucus normally drains out of your sinuses. When your nasal tissues become inflamed or swollen, the mucus can become trapped or blocked so air cannot flow through your sinuses. This allows bacteria, viruses, and funguses to grow, which leads to infection.  Sinusitis can develop quickly and last for 7-10 days (acute) or for more than 12 weeks (chronic). Sinusitis often develops after a cold.  CAUSES  This condition is caused by anything that creates swelling in the sinuses or stops mucus from draining, including:  · Allergies.  · Asthma.  · Bacterial or viral infection.  · Abnormally shaped bones between the nasal passages.  · Nasal growths that contain mucus (nasal polyps).  · Narrow sinus openings.  · Pollutants, such as chemicals or irritants in the air.  · A foreign object stuck in the nose.  · A fungal infection. This is rare.  RISK FACTORS  The following factors may make you more likely to develop this condition:  · Having allergies or asthma.  · Having had a recent cold or respiratory tract infection.  · Having structural deformities or blockages in your nose or sinuses.  · Having a weak immune system.  · Doing a lot of swimming or diving.  · Overusing nasal sprays.  · Smoking.  SYMPTOMS  The main symptoms of this condition are pain and a feeling of pressure around the affected sinuses. Other symptoms include:  · Upper toothache.  · Earache.  · Headache.  · Bad breath.  · Decreased sense of smell and taste.  · A cough that may get worse at  night.  · Fatigue.  · Fever.  · Thick drainage from your nose. The drainage is often green and it may contain pus (purulent).  · Stuffy nose or congestion.  · Postnasal drip. This is when extra mucus collects in the throat or back of the nose.  · Swelling and warmth over the affected sinuses.  · Sore throat.  · Sensitivity to light.  DIAGNOSIS  This condition is diagnosed based on symptoms, a medical history, and a physical exam. To find out if your condition is acute or chronic, your health care provider may:  · Look in your nose for signs of nasal polyps.  · Tap over the affected sinus to check for signs of infection.  · View the inside of your sinuses using an imaging device that has a light attached (endoscope).  If your health care provider suspects that you have chronic sinusitis, you may also:  · Be tested for allergies.  · Have a sample of mucus taken from your nose (nasal culture) and checked for bacteria.  · Have a mucus sample examined to see if your sinusitis is related to an allergy.  If your sinusitis does not respond to treatment and it lasts longer than 8 weeks, you may have an MRI or CT scan to check your sinuses. These scans also help to determine how severe your infection is.  In rare cases, a bone biopsy may be done to rule out more serious types of fungal sinus disease.  TREATMENT  Treatment for sinusitis depends on the cause and whether your condition is chronic or acute. If a virus is causing your sinusitis, your symptoms will go away on their own within 10 days. You may be given medicines to relieve your symptoms, including:  · Topical nasal decongestants. They shrink swollen nasal passages and let mucus drain from your sinuses.  · Antihistamines. These drugs block inflammation that is triggered by allergies. This can help to ease swelling in your nose and sinuses.  · Topical nasal corticosteroids. These are nasal sprays that ease inflammation and swelling in your nose and sinuses.  · Nasal  saline washes. These rinses can help to get rid of thick mucus in your nose.  If your condition is caused by bacteria, you will be given an antibiotic medicine. If your condition is caused by a fungus, you will be given an antifungal medicine.  Surgery may be needed to correct underlying conditions, such as narrow nasal passages. Surgery may also be needed to remove polyps.  HOME CARE INSTRUCTIONS  Medicines  · Take, use, or apply over-the-counter and prescription medicines only as told by your health care provider. These may include nasal sprays.  · If you were prescribed an antibiotic medicine, take it as told by your health care provider. Do not stop taking the antibiotic even if you start to feel better.  Hydrate and Humidify  · Drink enough water to keep your urine clear or pale yellow. Staying hydrated will help to thin your mucus.  · Use a cool mist humidifier to keep the humidity level in your home above 50%.  · Inhale steam for 10-15 minutes, 3-4 times a day or as told by your health care provider. You can do this in the bathroom while a hot shower is running.  · Limit your exposure to cool or dry air.  Rest  · Rest as much as possible.  · Sleep with your head raised (elevated).  · Make sure to get enough sleep each night.  General Instructions  · Apply a warm, moist washcloth to your face 3-4 times a day or as told by your health care provider. This will help with discomfort.  · Wash your hands often with soap and water to reduce your exposure to viruses and other germs. If soap and water are not available, use hand .  · Do not smoke. Avoid being around people who are smoking (secondhand smoke).  · Keep all follow-up visits as told by your health care provider. This is important.  SEEK MEDICAL CARE IF:  · You have a fever.  · Your symptoms get worse.  · Your symptoms do not improve within 10 days.  SEEK IMMEDIATE MEDICAL CARE IF:  · You have a severe headache.  · You have persistent  vomiting.  · You have pain or swelling around your face or eyes.  · You have vision problems.  · You develop confusion.  · Your neck is stiff.  · You have trouble breathing.     This information is not intended to replace advice given to you by your health care provider. Make sure you discuss any questions you have with your health care provider.     Document Released: 12/18/2006 Document Revised: 01/13/2017 Document Reviewed: 10/12/2016  [employer] Interactive Patient Education ©2016 [employer]

## (undated) DEVICE — APPL CHLORAPREP W/TINT 26ML ORNG

## (undated) DEVICE — GLV SURG TRIUMPH LT PF LTX 6.5 STRL

## (undated) DEVICE — TOTAL TRAY, 16FR 10ML SIL FOLEY, URN: Brand: MEDLINE

## (undated) DEVICE — GLV SURG SENSICARE GREEN W/ALOE PF LF 7 STRL

## (undated) DEVICE — GLV SURG TRIUMPH LT PF LTX 7 STRL

## (undated) DEVICE — SUT VIC 2/0 UR6 27IN VCP602H

## (undated) DEVICE — SUT PDS CLOSURE CT1 1/0 27IN Z341H

## (undated) DEVICE — SOL IRRIG NACL 1000ML

## (undated) DEVICE — GLV SURG TRIUMPH LT PF LTX 7.5 STRL

## (undated) DEVICE — STERILE POLYISOPRENE POWDER-FREE SURGICAL GLOVES: Brand: PROTEXIS

## (undated) DEVICE — ENDOPATH XCEL DILATING TIP TROCARS WITH STABILITY SLEEVES: Brand: ENDOPATH XCEL

## (undated) DEVICE — SUT MONOCRYL 4/0 PS2 27IN Y426H ETY426H

## (undated) DEVICE — STERILE POLYISOPRENE POWDER-FREE SURGICAL GLOVES WITH EMOLLIENT COATING: Brand: PROTEXIS

## (undated) DEVICE — LIGHT HANDLE: Brand: DEVON

## (undated) DEVICE — SUT MNCRYL 3/0 Y936H

## (undated) DEVICE — SKIN AFFIX SURG ADHESIVE 72/CS 0.55ML: Brand: MEDLINE

## (undated) DEVICE — MONOPOLAR METZENBAUM SCISSOR TIP, DISPOSABLE: Brand: MONOPOLAR METZENBAUM SCISSOR TIP, DISPOSABLE

## (undated) DEVICE — SOL IRR NACL 0.9PCT BT 1000ML

## (undated) DEVICE — BLUNT TIP TROCAR: Brand: AUTO SUTURE

## (undated) DEVICE — TROCAR SITE CLOSURE DEVICE: Brand: ENDO CLOSE

## (undated) DEVICE — GLV SURG SENSICARE GREEN W/ALOE PF LF 6.5 STRL

## (undated) DEVICE — GOWN,AURORA,NOREINF,RAGLAN,XL,STERILE: Brand: MEDLINE

## (undated) DEVICE — GOWN,PREVENTION PLUS,XLNG/XXLARGE,STRL: Brand: MEDLINE

## (undated) DEVICE — PK LAP CHOLE LF 60